# Patient Record
Sex: MALE | Race: WHITE | NOT HISPANIC OR LATINO | URBAN - METROPOLITAN AREA
[De-identification: names, ages, dates, MRNs, and addresses within clinical notes are randomized per-mention and may not be internally consistent; named-entity substitution may affect disease eponyms.]

---

## 2021-01-07 ENCOUNTER — INPATIENT (INPATIENT)
Facility: HOSPITAL | Age: 86
LOS: 12 days | Discharge: NURSING, REHAB OR LONG-TERM CARE | DRG: 177 | End: 2021-01-20
Attending: INTERNAL MEDICINE | Admitting: INTERNAL MEDICINE
Payer: MEDICARE

## 2021-01-07 VITALS
HEART RATE: 95 BPM | SYSTOLIC BLOOD PRESSURE: 108 MMHG | TEMPERATURE: 98 F | DIASTOLIC BLOOD PRESSURE: 68 MMHG | OXYGEN SATURATION: 88 % | RESPIRATION RATE: 17 BRPM

## 2021-01-07 DIAGNOSIS — Z29.9 ENCOUNTER FOR PROPHYLACTIC MEASURES, UNSPECIFIED: ICD-10-CM

## 2021-01-07 DIAGNOSIS — U07.1 COVID-19: ICD-10-CM

## 2021-01-07 DIAGNOSIS — R07.9 CHEST PAIN, UNSPECIFIED: ICD-10-CM

## 2021-01-07 DIAGNOSIS — I10 ESSENTIAL (PRIMARY) HYPERTENSION: ICD-10-CM

## 2021-01-07 DIAGNOSIS — Z90.49 ACQUIRED ABSENCE OF OTHER SPECIFIED PARTS OF DIGESTIVE TRACT: Chronic | ICD-10-CM

## 2021-01-07 DIAGNOSIS — E11.9 TYPE 2 DIABETES MELLITUS WITHOUT COMPLICATIONS: ICD-10-CM

## 2021-01-07 DIAGNOSIS — Z71.89 OTHER SPECIFIED COUNSELING: ICD-10-CM

## 2021-01-07 DIAGNOSIS — N17.9 ACUTE KIDNEY FAILURE, UNSPECIFIED: ICD-10-CM

## 2021-01-07 DIAGNOSIS — E78.5 HYPERLIPIDEMIA, UNSPECIFIED: ICD-10-CM

## 2021-01-07 LAB
ALBUMIN SERPL ELPH-MCNC: 2.8 G/DL — LOW (ref 3.5–5)
ALP SERPL-CCNC: 48 U/L — SIGNIFICANT CHANGE UP (ref 40–120)
ALT FLD-CCNC: 33 U/L DA — SIGNIFICANT CHANGE UP (ref 10–60)
ANION GAP SERPL CALC-SCNC: 11 MMOL/L — SIGNIFICANT CHANGE UP (ref 5–17)
ANISOCYTOSIS BLD QL: SLIGHT — SIGNIFICANT CHANGE UP
AST SERPL-CCNC: 103 U/L — HIGH (ref 10–40)
BASE EXCESS BLDV CALC-SCNC: 1.9 MMOL/L — SIGNIFICANT CHANGE UP (ref -2–2)
BASOPHILS # BLD AUTO: 0 K/UL — SIGNIFICANT CHANGE UP (ref 0–0.2)
BASOPHILS NFR BLD AUTO: 0 % — SIGNIFICANT CHANGE UP (ref 0–2)
BILIRUB SERPL-MCNC: 0.8 MG/DL — SIGNIFICANT CHANGE UP (ref 0.2–1.2)
BLOOD GAS COMMENTS, VENOUS: SIGNIFICANT CHANGE UP
BUN SERPL-MCNC: 27 MG/DL — HIGH (ref 7–18)
CALCIUM SERPL-MCNC: 8.8 MG/DL — SIGNIFICANT CHANGE UP (ref 8.4–10.5)
CHLORIDE SERPL-SCNC: 102 MMOL/L — SIGNIFICANT CHANGE UP (ref 96–108)
CHLORIDE UR-SCNC: 56 MMOL/L — SIGNIFICANT CHANGE UP
CHOLEST SERPL-MCNC: 101 MG/DL — SIGNIFICANT CHANGE UP
CK MB BLD-MCNC: <0.1 % — SIGNIFICANT CHANGE UP (ref 0–3.5)
CK MB CFR SERPL CALC: <1 NG/ML — SIGNIFICANT CHANGE UP (ref 0–3.6)
CK SERPL-CCNC: 1162 U/L — HIGH (ref 35–232)
CK SERPL-CCNC: 1350 U/L — HIGH (ref 35–232)
CO2 SERPL-SCNC: 25 MMOL/L — SIGNIFICANT CHANGE UP (ref 22–31)
CREAT ?TM UR-MCNC: 148 MG/DL — SIGNIFICANT CHANGE UP
CREAT SERPL-MCNC: 1.64 MG/DL — HIGH (ref 0.5–1.3)
D DIMER BLD IA.RAPID-MCNC: 606 NG/ML DDU — HIGH
EOSINOPHIL # BLD AUTO: 0 K/UL — SIGNIFICANT CHANGE UP (ref 0–0.5)
EOSINOPHIL NFR BLD AUTO: 0 % — SIGNIFICANT CHANGE UP (ref 0–6)
ERYTHROCYTE [SEDIMENTATION RATE] IN BLOOD: 20 MM/HR — SIGNIFICANT CHANGE UP (ref 0–20)
GLUCOSE BLDC GLUCOMTR-MCNC: 288 MG/DL — HIGH (ref 70–99)
GLUCOSE SERPL-MCNC: 224 MG/DL — HIGH (ref 70–99)
HCO3 BLDV-SCNC: 27 MMOL/L — SIGNIFICANT CHANGE UP (ref 21–29)
HCT VFR BLD CALC: 46.8 % — SIGNIFICANT CHANGE UP (ref 39–50)
HDLC SERPL-MCNC: 74 MG/DL — SIGNIFICANT CHANGE UP
HGB BLD-MCNC: 15.6 G/DL — SIGNIFICANT CHANGE UP (ref 13–17)
HOROWITZ INDEX BLDV+IHG-RTO: 21 — SIGNIFICANT CHANGE UP
INR BLD: 1.12 RATIO — SIGNIFICANT CHANGE UP (ref 0.88–1.16)
LACTATE SERPL-SCNC: 1.9 MMOL/L — SIGNIFICANT CHANGE UP (ref 0.7–2)
LIPID PNL WITH DIRECT LDL SERPL: 10 MG/DL — SIGNIFICANT CHANGE UP
LYMPHOCYTES # BLD AUTO: 0.66 K/UL — LOW (ref 1–3.3)
LYMPHOCYTES # BLD AUTO: 10 % — LOW (ref 13–44)
MANUAL SMEAR VERIFICATION: SIGNIFICANT CHANGE UP
MCHC RBC-ENTMCNC: 31.1 PG — SIGNIFICANT CHANGE UP (ref 27–34)
MCHC RBC-ENTMCNC: 33.3 GM/DL — SIGNIFICANT CHANGE UP (ref 32–36)
MCV RBC AUTO: 93.2 FL — SIGNIFICANT CHANGE UP (ref 80–100)
MICROCYTES BLD QL: SLIGHT — SIGNIFICANT CHANGE UP
MONOCYTES # BLD AUTO: 0.26 K/UL — SIGNIFICANT CHANGE UP (ref 0–0.9)
MONOCYTES NFR BLD AUTO: 4 % — SIGNIFICANT CHANGE UP (ref 2–14)
NEUTROPHILS # BLD AUTO: 5.43 K/UL — SIGNIFICANT CHANGE UP (ref 1.8–7.4)
NEUTROPHILS NFR BLD AUTO: 82 % — HIGH (ref 43–77)
NON HDL CHOLESTEROL: 27 MG/DL — SIGNIFICANT CHANGE UP
NRBC # BLD: 0 /100 — SIGNIFICANT CHANGE UP (ref 0–0)
NT-PROBNP SERPL-SCNC: 231 PG/ML — SIGNIFICANT CHANGE UP (ref 0–450)
OSMOLALITY UR: 816 MOS/KG — SIGNIFICANT CHANGE UP (ref 50–1200)
OVALOCYTES BLD QL SMEAR: SLIGHT — SIGNIFICANT CHANGE UP
PCO2 BLDV: 44 MMHG — SIGNIFICANT CHANGE UP (ref 35–50)
PH BLDV: 7.4 — SIGNIFICANT CHANGE UP (ref 7.35–7.45)
PLAT MORPH BLD: NORMAL — SIGNIFICANT CHANGE UP
PLATELET # BLD AUTO: 135 K/UL — LOW (ref 150–400)
PO2 BLDV: 21 MMHG — LOW (ref 25–45)
POIKILOCYTOSIS BLD QL AUTO: SLIGHT — SIGNIFICANT CHANGE UP
POTASSIUM SERPL-MCNC: 4.4 MMOL/L — SIGNIFICANT CHANGE UP (ref 3.5–5.3)
POTASSIUM SERPL-SCNC: 4.4 MMOL/L — SIGNIFICANT CHANGE UP (ref 3.5–5.3)
PROT SERPL-MCNC: 7 G/DL — SIGNIFICANT CHANGE UP (ref 6–8.3)
PROTHROM AB SERPL-ACNC: 13.2 SEC — SIGNIFICANT CHANGE UP (ref 10.6–13.6)
RAPID RVP RESULT: DETECTED
RBC # BLD: 5.02 M/UL — SIGNIFICANT CHANGE UP (ref 4.2–5.8)
RBC # FLD: 13.8 % — SIGNIFICANT CHANGE UP (ref 10.3–14.5)
RBC BLD AUTO: ABNORMAL
SAO2 % BLDV: 29 % — LOW (ref 67–88)
SARS-COV-2 RNA SPEC QL NAA+PROBE: DETECTED
SODIUM SERPL-SCNC: 138 MMOL/L — SIGNIFICANT CHANGE UP (ref 135–145)
SODIUM UR-SCNC: 56 MMOL/L — SIGNIFICANT CHANGE UP
TRIGL SERPL-MCNC: 83 MG/DL — SIGNIFICANT CHANGE UP
TROPONIN I SERPL-MCNC: 0.03 NG/ML — SIGNIFICANT CHANGE UP (ref 0–0.04)
TROPONIN I SERPL-MCNC: 0.03 NG/ML — SIGNIFICANT CHANGE UP (ref 0–0.04)
TSH SERPL-MCNC: 1.02 UU/ML — SIGNIFICANT CHANGE UP (ref 0.34–4.82)
VARIANT LYMPHS # BLD: 4 % — SIGNIFICANT CHANGE UP (ref 0–6)
WBC # BLD: 6.62 K/UL — SIGNIFICANT CHANGE UP (ref 3.8–10.5)
WBC # FLD AUTO: 6.62 K/UL — SIGNIFICANT CHANGE UP (ref 3.8–10.5)

## 2021-01-07 PROCEDURE — 93010 ELECTROCARDIOGRAM REPORT: CPT

## 2021-01-07 PROCEDURE — 70450 CT HEAD/BRAIN W/O DYE: CPT | Mod: 26

## 2021-01-07 PROCEDURE — 99283 EMERGENCY DEPT VISIT LOW MDM: CPT | Mod: CS

## 2021-01-07 PROCEDURE — 71045 X-RAY EXAM CHEST 1 VIEW: CPT | Mod: 26

## 2021-01-07 RX ORDER — REMDESIVIR 5 MG/ML
200 INJECTION INTRAVENOUS EVERY 24 HOURS
Refills: 0 | Status: COMPLETED | OUTPATIENT
Start: 2021-01-07 | End: 2021-01-07

## 2021-01-07 RX ORDER — ATORVASTATIN CALCIUM 80 MG/1
40 TABLET, FILM COATED ORAL AT BEDTIME
Refills: 0 | Status: DISCONTINUED | OUTPATIENT
Start: 2021-01-07 | End: 2021-01-20

## 2021-01-07 RX ORDER — PANTOPRAZOLE SODIUM 20 MG/1
40 TABLET, DELAYED RELEASE ORAL
Refills: 0 | Status: DISCONTINUED | OUTPATIENT
Start: 2021-01-07 | End: 2021-01-20

## 2021-01-07 RX ORDER — SODIUM CHLORIDE 9 MG/ML
1000 INJECTION INTRAMUSCULAR; INTRAVENOUS; SUBCUTANEOUS ONCE
Refills: 0 | Status: COMPLETED | OUTPATIENT
Start: 2021-01-07 | End: 2021-01-07

## 2021-01-07 RX ORDER — HEPARIN SODIUM 5000 [USP'U]/ML
5000 INJECTION INTRAVENOUS; SUBCUTANEOUS EVERY 8 HOURS
Refills: 0 | Status: DISCONTINUED | OUTPATIENT
Start: 2021-01-07 | End: 2021-01-14

## 2021-01-07 RX ORDER — DEXAMETHASONE 0.5 MG/5ML
10 ELIXIR ORAL ONCE
Refills: 0 | Status: COMPLETED | OUTPATIENT
Start: 2021-01-07 | End: 2021-01-07

## 2021-01-07 RX ORDER — INSULIN LISPRO 100/ML
VIAL (ML) SUBCUTANEOUS
Refills: 0 | Status: DISCONTINUED | OUTPATIENT
Start: 2021-01-07 | End: 2021-01-12

## 2021-01-07 RX ORDER — ASPIRIN/CALCIUM CARB/MAGNESIUM 324 MG
81 TABLET ORAL DAILY
Refills: 0 | Status: DISCONTINUED | OUTPATIENT
Start: 2021-01-07 | End: 2021-01-20

## 2021-01-07 RX ORDER — DEXAMETHASONE 0.5 MG/5ML
6 ELIXIR ORAL DAILY
Refills: 0 | Status: COMPLETED | OUTPATIENT
Start: 2021-01-07 | End: 2021-01-12

## 2021-01-07 RX ADMIN — ATORVASTATIN CALCIUM 40 MILLIGRAM(S): 80 TABLET, FILM COATED ORAL at 21:33

## 2021-01-07 RX ADMIN — Medication 102 MILLIGRAM(S): at 14:56

## 2021-01-07 RX ADMIN — HEPARIN SODIUM 5000 UNIT(S): 5000 INJECTION INTRAVENOUS; SUBCUTANEOUS at 21:34

## 2021-01-07 RX ADMIN — Medication 81 MILLIGRAM(S): at 17:23

## 2021-01-07 RX ADMIN — REMDESIVIR 500 MILLIGRAM(S): 5 INJECTION INTRAVENOUS at 21:34

## 2021-01-07 RX ADMIN — SODIUM CHLORIDE 1000 MILLILITER(S): 9 INJECTION INTRAMUSCULAR; INTRAVENOUS; SUBCUTANEOUS at 13:38

## 2021-01-07 NOTE — ED PROVIDER NOTE - CONSTITUTIONAL, MLM
normal... Elderly gentleman, nontoxic appearing, awake, alert, oriented to person, place, time/situation and in no apparent distress.

## 2021-01-07 NOTE — H&P ADULT - HISTORY OF PRESENT ILLNESS
85 year old male with significant medical history of Dm, HLD, HTn and surgical history of appendectomy presenting to the ED with complaints of abdominal pain and intermittent chest pain for the last 7 days. He had fever 7 days ago, since then he is feeling weak Reports that the chest pain is on and off and that yesterday patient fell down due to weakness. Relates that he has not been able to eat, having loss of appetite, but does feel thirsty. States that he had a fever a couple of days ago. Denies significant weight loss, cough, abdominal pain, nausea, vomiting, headache, visual changes, or any other acute complaints. 85 year old male with significant medical history of Dm, HLD, HTn, BPH and surgical history of appendectomy presenting to the ED with complaints of abdominal pain and intermittent chest pain for the last 7 days. He had fever 7 days ago, since then he is feeling weak, had two falls, one was 3 days ago and the other was yesterday. He denies LOC, syncope or prodromal symptoms. He says he hit his head at back. Reports that the chest pain is on and off, 5/10, non radiating, feels like pressure and tightness, associated with breathing difficulty. Relates that he has not been able to eat, having loss of appetite, but does feel thirsty. Denies significant weight loss, cough, abdominal pain, nausea, vomiting, headache, visual changes, or any other acute complaints.

## 2021-01-07 NOTE — H&P ADULT - NSICDXPASTMEDICALHX_GEN_ALL_CORE_FT
PAST MEDICAL HISTORY:  BPH (benign prostatic hyperplasia)     DM (diabetes mellitus)     HLD (hyperlipidemia)     HTN (hypertension)

## 2021-01-07 NOTE — H&P ADULT - ASSESSMENT
85 year old male with significant medical history of Dm, HLD, HTn and surgical history of appendectomy presenting to the ED with complaints of abdominal pain and intermittent chest pain for the last 7 days.    ED:  COVID positive   CXR b/l infiltrate  Saturating 89%on RA    Patient is being admitted to tele to r/o ACS, NSTEMi     85 year old male with significant medical history of Dm, HLD, HTn and surgical history of appendectomy presenting to the ED with complaints of abdominal pain and intermittent chest pain for the last 7 days.    ED:  COVID positive   CXR b/l infiltrate  Saturating 89%on RA    Patient is being admitted to tele to r/o ACS, NSTEMi    ******Asked patient regarding medications, he did not remember the. reached out to son, he did not know either pharmcy or medications name. He said to call back in few hours, so that he can reach home and find out. I called him back at 6Pm, he gave me pharmacy name, but did not know medications. I tried reaching pharmacy, it is closed. I tried reaching PCP,  answered the call and put me on hold for 10 minutes and replied back saying doctor is busy. Medications from EMS record was entered in OP med rec. Please call pharmacy in AM if possible to get medications****     85 year old male with significant medical history of Dm, HLD, HTn and surgical history of appendectomy presenting to the ED with complaints of abdominal pain and intermittent chest pain for the last 7 days.    ED:  COVID positive   CXR b/l infiltrate  Saturating 89%on RA    Patient is being admitted to tele to r/o ACS, NSTEMi    ******Asked patient regarding medications, he did not remember them. Reached out to son, he did not know either pharmcy or medications name. He said to call back in few hours, so that he can reach home and find out. I called him back at 6Pm, he gave me pharmacy name, but did not know medications. I tried reaching pharmacy, it is closed. I tried reaching PCP,  answered the call and put me on hold for 10 minutes and replied back saying doctor is busy. I entered medications from EMS record in OP med rec. Please call pharmacy in AM if possible to get medications****

## 2021-01-07 NOTE — H&P ADULT - PROBLEM SELECTOR PLAN 5
Has h/o DM  Started on HSS  Monitor ACHS   f/u HBA1c Has h/o HTn  c/w home meds with parameters  Restart once medications are reconciled from pharmacy  monitor BP

## 2021-01-07 NOTE — H&P ADULT - NSHPPHYSICALEXAM_GEN_ALL_CORE
Vital Signs (24 Hrs):  T(C): 36.9 (01-07-21 @ 10:19), Max: 36.9 (01-07-21 @ 10:19)  HR: 95 (01-07-21 @ 10:19) (95 - 95)  BP: 108/68 (01-07-21 @ 10:19) (108/68 - 108/68)  RR: 18 (01-07-21 @ 14:38) (17 - 18)  SpO2: 96% (01-07-21 @ 14:38) (88% - 96%)

## 2021-01-07 NOTE — H&P ADULT - PROBLEM SELECTOR PLAN 4
Has h/o HTn  c/w home meds with parameters  monitor BP Has h/o HTn  c/w home meds with parameters  Restart once medications are reconciled from pharmacy  monitor BP presented with elevated cr  Likely due to dehydration, holding IV fluids in view of covid  f/u urine lytes  Monitor BMP  if cr clearance decreases please hold remdesivir  avoid nephrotoxins

## 2021-01-07 NOTE — H&P ADULT - PROBLEM SELECTOR PLAN 2
Presented with respiratory status and chest pain  Saturating 89% on Ra  Saturation improved to 94% on 2L NC  COVID positive  f/u inflammatory markers  Will start on decadron and remdesivir  Monitor respiratory status  O2 supplement as needed Presented with respiratory status and chest pain  Saturating 89% on Ra  Saturation improved to 94% on 2L NC  COVID positive  f/u inflammatory markers  Will start on decadron   Will give one dose of remdesivir, check cr cl tomorrow and resume other doses as cr cl is borderline today  Monitor respiratory status  O2 supplement as needed

## 2021-01-07 NOTE — ED ADULT NURSE NOTE - OBJECTIVE STATEMENT
Russian  used Pina #[id number]. Pt c/o of fever that occurred 7 days ago and from that time he has been feeling weakness in his legs and arms. Pt states he fell yesterday. Pt is A&OX3, no acute distress noted, denies chest pain, no shortness of breath indicated.

## 2021-01-07 NOTE — ED PROVIDER NOTE - CLINICAL SUMMARY MEDICAL DECISION MAKING FREE TEXT BOX
85 year old male presenting with weakness and fatigue. Could be secondary to underlying malignancy vs ACS vs dehydration vs infectious. Will order labs, EKG, head CT, and likely admit.

## 2021-01-07 NOTE — ED PROVIDER NOTE - OBJECTIVE STATEMENT
85 year old male with PSHx of appendectomy presenting to the ED with complaints of generalized weakness and intermittent chest pain for the last 7 days. Reports that the chest pain is on and off and that yesterday patient fell down due to weakness. Relates that he has not been able to eat, having loss of appetite, but does feel thirsty. States that he had a fever a couple of days ago. Denies significant weight loss, cough, abdominal pain, nausea, vomiting, headache, visual changes, or any other acute complaints.

## 2021-01-07 NOTE — ED PROVIDER NOTE - CARE PLAN
Principal Discharge DX:	COVID-19   Principal Discharge DX:	COVID-19  Secondary Diagnosis:	Rhabdomyolysis  Secondary Diagnosis:	Hypoxia  Secondary Diagnosis:	Weakness

## 2021-01-07 NOTE — ED PROVIDER NOTE - PROGRESS NOTE DETAILS
ricardo: pt cxr shows covid, covid swab +. pt with mild rhabdo. ct head no ich or acute path on my read. pt require 4 L NC to keep o2 above 92%, room air 88%.  admit to med for covid 19

## 2021-01-07 NOTE — H&P ADULT - PROBLEM SELECTOR PLAN 1
Presented with chest pain, atypical  Examination findings significant for ronchi in b/l lung fields  COVID positive  CXr shows b/l infiltrates  ED admitted to tele for chest pain  Likely pain is due to covid infection and respiratory distress  Troponin 1 negative  observe on tele for 24 hrs and trend trop  Cardio- Dr Beard

## 2021-01-08 ENCOUNTER — TRANSCRIPTION ENCOUNTER (OUTPATIENT)
Age: 86
End: 2021-01-08

## 2021-01-08 LAB
A1C WITH ESTIMATED AVERAGE GLUCOSE RESULT: 7.7 % — HIGH (ref 4–5.6)
ALBUMIN SERPL ELPH-MCNC: 2.5 G/DL — LOW (ref 3.5–5)
ALP SERPL-CCNC: 47 U/L — SIGNIFICANT CHANGE UP (ref 40–120)
ALT FLD-CCNC: 35 U/L DA — SIGNIFICANT CHANGE UP (ref 10–60)
ANION GAP SERPL CALC-SCNC: 14 MMOL/L — SIGNIFICANT CHANGE UP (ref 5–17)
APPEARANCE UR: CLEAR — SIGNIFICANT CHANGE UP
APPEARANCE UR: CLEAR — SIGNIFICANT CHANGE UP
AST SERPL-CCNC: 90 U/L — HIGH (ref 10–40)
BACTERIA # UR AUTO: ABNORMAL /HPF
BACTERIA # UR AUTO: ABNORMAL /HPF
BASOPHILS # BLD AUTO: 0.04 K/UL — SIGNIFICANT CHANGE UP (ref 0–0.2)
BASOPHILS NFR BLD AUTO: 0.6 % — SIGNIFICANT CHANGE UP (ref 0–2)
BILIRUB SERPL-MCNC: 0.8 MG/DL — SIGNIFICANT CHANGE UP (ref 0.2–1.2)
BILIRUB UR-MCNC: NEGATIVE — SIGNIFICANT CHANGE UP
BILIRUB UR-MCNC: NEGATIVE — SIGNIFICANT CHANGE UP
BUN SERPL-MCNC: 29 MG/DL — HIGH (ref 7–18)
CALCIUM SERPL-MCNC: 8.7 MG/DL — SIGNIFICANT CHANGE UP (ref 8.4–10.5)
CHLORIDE SERPL-SCNC: 108 MMOL/L — SIGNIFICANT CHANGE UP (ref 96–108)
CO2 SERPL-SCNC: 21 MMOL/L — LOW (ref 22–31)
COLOR SPEC: YELLOW — SIGNIFICANT CHANGE UP
COLOR SPEC: YELLOW — SIGNIFICANT CHANGE UP
CREAT SERPL-MCNC: 1.52 MG/DL — HIGH (ref 0.5–1.3)
CRP SERPL-MCNC: 8.05 MG/DL — HIGH (ref 0–0.4)
CRP SERPL-MCNC: 8.83 MG/DL — HIGH (ref 0–0.4)
DIFF PNL FLD: ABNORMAL
DIFF PNL FLD: ABNORMAL
EOSINOPHIL # BLD AUTO: 0 K/UL — SIGNIFICANT CHANGE UP (ref 0–0.5)
EOSINOPHIL NFR BLD AUTO: 0 % — SIGNIFICANT CHANGE UP (ref 0–6)
EPI CELLS # UR: SIGNIFICANT CHANGE UP /HPF
EPI CELLS # UR: SIGNIFICANT CHANGE UP /HPF
ESTIMATED AVERAGE GLUCOSE: 174 MG/DL — HIGH (ref 68–114)
FOLATE SERPL-MCNC: 11.5 NG/ML — SIGNIFICANT CHANGE UP
GLUCOSE BLDC GLUCOMTR-MCNC: 272 MG/DL — HIGH (ref 70–99)
GLUCOSE BLDC GLUCOMTR-MCNC: 288 MG/DL — HIGH (ref 70–99)
GLUCOSE BLDC GLUCOMTR-MCNC: 296 MG/DL — HIGH (ref 70–99)
GLUCOSE BLDC GLUCOMTR-MCNC: 303 MG/DL — HIGH (ref 70–99)
GLUCOSE SERPL-MCNC: 327 MG/DL — HIGH (ref 70–99)
GLUCOSE UR QL: 1000 MG/DL
GLUCOSE UR QL: 1000 MG/DL
HCT VFR BLD CALC: 47.2 % — SIGNIFICANT CHANGE UP (ref 39–50)
HGB BLD-MCNC: 15.6 G/DL — SIGNIFICANT CHANGE UP (ref 13–17)
IMM GRANULOCYTES NFR BLD AUTO: 5.2 % — HIGH (ref 0–1.5)
KETONES UR-MCNC: ABNORMAL
KETONES UR-MCNC: ABNORMAL
LEUKOCYTE ESTERASE UR-ACNC: NEGATIVE — SIGNIFICANT CHANGE UP
LEUKOCYTE ESTERASE UR-ACNC: NEGATIVE — SIGNIFICANT CHANGE UP
LYMPHOCYTES # BLD AUTO: 0.74 K/UL — LOW (ref 1–3.3)
LYMPHOCYTES # BLD AUTO: 11.3 % — LOW (ref 13–44)
MAGNESIUM SERPL-MCNC: 2.4 MG/DL — SIGNIFICANT CHANGE UP (ref 1.6–2.6)
MCHC RBC-ENTMCNC: 30.8 PG — SIGNIFICANT CHANGE UP (ref 27–34)
MCHC RBC-ENTMCNC: 33.1 GM/DL — SIGNIFICANT CHANGE UP (ref 32–36)
MCV RBC AUTO: 93.3 FL — SIGNIFICANT CHANGE UP (ref 80–100)
MONOCYTES # BLD AUTO: 0.28 K/UL — SIGNIFICANT CHANGE UP (ref 0–0.9)
MONOCYTES NFR BLD AUTO: 4.3 % — SIGNIFICANT CHANGE UP (ref 2–14)
NEUTROPHILS # BLD AUTO: 5.12 K/UL — SIGNIFICANT CHANGE UP (ref 1.8–7.4)
NEUTROPHILS NFR BLD AUTO: 78.6 % — HIGH (ref 43–77)
NITRITE UR-MCNC: NEGATIVE — SIGNIFICANT CHANGE UP
NITRITE UR-MCNC: NEGATIVE — SIGNIFICANT CHANGE UP
NRBC # BLD: 0 /100 WBCS — SIGNIFICANT CHANGE UP (ref 0–0)
PH UR: 5 — SIGNIFICANT CHANGE UP (ref 5–8)
PH UR: 5 — SIGNIFICANT CHANGE UP (ref 5–8)
PHOSPHATE SERPL-MCNC: 3.7 MG/DL — SIGNIFICANT CHANGE UP (ref 2.5–4.5)
PLATELET # BLD AUTO: 169 K/UL — SIGNIFICANT CHANGE UP (ref 150–400)
POTASSIUM SERPL-MCNC: 4.4 MMOL/L — SIGNIFICANT CHANGE UP (ref 3.5–5.3)
POTASSIUM SERPL-SCNC: 4.4 MMOL/L — SIGNIFICANT CHANGE UP (ref 3.5–5.3)
PROCALCITONIN SERPL-MCNC: 0.19 NG/ML — HIGH (ref 0.02–0.1)
PROT SERPL-MCNC: 6.5 G/DL — SIGNIFICANT CHANGE UP (ref 6–8.3)
PROT UR-MCNC: 100
PROT UR-MCNC: 100
RBC # BLD: 5.06 M/UL — SIGNIFICANT CHANGE UP (ref 4.2–5.8)
RBC # FLD: 13.8 % — SIGNIFICANT CHANGE UP (ref 10.3–14.5)
RBC CASTS # UR COMP ASSIST: ABNORMAL /HPF (ref 0–2)
RBC CASTS # UR COMP ASSIST: ABNORMAL /HPF (ref 0–2)
SODIUM SERPL-SCNC: 143 MMOL/L — SIGNIFICANT CHANGE UP (ref 135–145)
SP GR SPEC: 1.02 — SIGNIFICANT CHANGE UP (ref 1.01–1.02)
SP GR SPEC: 1.02 — SIGNIFICANT CHANGE UP (ref 1.01–1.02)
UROBILINOGEN FLD QL: NEGATIVE — SIGNIFICANT CHANGE UP
UROBILINOGEN FLD QL: NEGATIVE — SIGNIFICANT CHANGE UP
VIT B12 SERPL-MCNC: 1619 PG/ML — HIGH (ref 232–1245)
WBC # BLD: 6.52 K/UL — SIGNIFICANT CHANGE UP (ref 3.8–10.5)
WBC # FLD AUTO: 6.52 K/UL — SIGNIFICANT CHANGE UP (ref 3.8–10.5)
WBC UR QL: SIGNIFICANT CHANGE UP /HPF (ref 0–5)
WBC UR QL: SIGNIFICANT CHANGE UP /HPF (ref 0–5)

## 2021-01-08 RX ORDER — ASPIRIN/CALCIUM CARB/MAGNESIUM 324 MG
1 TABLET ORAL
Qty: 0 | Refills: 0 | DISCHARGE

## 2021-01-08 RX ORDER — TAMSULOSIN HYDROCHLORIDE 0.4 MG/1
1 CAPSULE ORAL
Qty: 0 | Refills: 0 | DISCHARGE

## 2021-01-08 RX ORDER — SODIUM CHLORIDE 9 MG/ML
1000 INJECTION, SOLUTION INTRAVENOUS
Refills: 0 | Status: DISCONTINUED | OUTPATIENT
Start: 2021-01-08 | End: 2021-01-09

## 2021-01-08 RX ORDER — SEMAGLUTIDE 0.68 MG/ML
1 INJECTION, SOLUTION SUBCUTANEOUS
Qty: 0 | Refills: 0 | DISCHARGE

## 2021-01-08 RX ORDER — LOSARTAN POTASSIUM 100 MG/1
25 TABLET, FILM COATED ORAL DAILY
Refills: 0 | Status: DISCONTINUED | OUTPATIENT
Start: 2021-01-08 | End: 2021-01-20

## 2021-01-08 RX ORDER — BROMOCRIPTINE MESYLATE 5 MG/1
2 CAPSULE ORAL
Qty: 0 | Refills: 0 | DISCHARGE

## 2021-01-08 RX ORDER — TAMSULOSIN HYDROCHLORIDE 0.4 MG/1
0.4 CAPSULE ORAL AT BEDTIME
Refills: 0 | Status: DISCONTINUED | OUTPATIENT
Start: 2021-01-08 | End: 2021-01-20

## 2021-01-08 RX ORDER — REMDESIVIR 5 MG/ML
100 INJECTION INTRAVENOUS EVERY 24 HOURS
Refills: 0 | Status: COMPLETED | OUTPATIENT
Start: 2021-01-08 | End: 2021-01-11

## 2021-01-08 RX ORDER — AZILSARTAN KAMEDOXOMIL 40 MG/1
1 TABLET ORAL
Qty: 0 | Refills: 0 | DISCHARGE

## 2021-01-08 RX ORDER — INSULIN GLARGINE 100 [IU]/ML
10 INJECTION, SOLUTION SUBCUTANEOUS AT BEDTIME
Refills: 0 | Status: DISCONTINUED | OUTPATIENT
Start: 2021-01-08 | End: 2021-01-11

## 2021-01-08 RX ORDER — REMDESIVIR 5 MG/ML
INJECTION INTRAVENOUS
Refills: 0 | Status: DISCONTINUED | OUTPATIENT
Start: 2021-01-08 | End: 2021-01-08

## 2021-01-08 RX ADMIN — HEPARIN SODIUM 5000 UNIT(S): 5000 INJECTION INTRAVENOUS; SUBCUTANEOUS at 21:38

## 2021-01-08 RX ADMIN — Medication 81 MILLIGRAM(S): at 12:17

## 2021-01-08 RX ADMIN — HEPARIN SODIUM 5000 UNIT(S): 5000 INJECTION INTRAVENOUS; SUBCUTANEOUS at 06:24

## 2021-01-08 RX ADMIN — Medication 3: at 16:59

## 2021-01-08 RX ADMIN — INSULIN GLARGINE 10 UNIT(S): 100 INJECTION, SOLUTION SUBCUTANEOUS at 21:38

## 2021-01-08 RX ADMIN — ATORVASTATIN CALCIUM 40 MILLIGRAM(S): 80 TABLET, FILM COATED ORAL at 21:38

## 2021-01-08 RX ADMIN — Medication 4: at 07:57

## 2021-01-08 RX ADMIN — HEPARIN SODIUM 5000 UNIT(S): 5000 INJECTION INTRAVENOUS; SUBCUTANEOUS at 14:07

## 2021-01-08 RX ADMIN — SODIUM CHLORIDE 70 MILLILITER(S): 9 INJECTION, SOLUTION INTRAVENOUS at 14:42

## 2021-01-08 RX ADMIN — TAMSULOSIN HYDROCHLORIDE 0.4 MILLIGRAM(S): 0.4 CAPSULE ORAL at 21:38

## 2021-01-08 RX ADMIN — PANTOPRAZOLE SODIUM 40 MILLIGRAM(S): 20 TABLET, DELAYED RELEASE ORAL at 06:24

## 2021-01-08 RX ADMIN — Medication 6 MILLIGRAM(S): at 06:24

## 2021-01-08 RX ADMIN — REMDESIVIR 500 MILLIGRAM(S): 5 INJECTION INTRAVENOUS at 21:38

## 2021-01-08 RX ADMIN — Medication 3: at 12:17

## 2021-01-08 NOTE — PROGRESS NOTE ADULT - PROBLEM SELECTOR PLAN 1
Presented with chest pain, atypical  Examination findings significant for ronchi in b/l lung fields  COVID positive  CXr shows b/l infiltrates  ED admitted to tele for chest pain  Likely pain is due to covid infection and respiratory distress  Troponin 1 negative  observe on tele for 24 hrs and trend trop  Cardio- Dr Beard Presented with chest pain, atypical  COVID positive  CXr shows b/l infiltrates  ED admitted to tele for chest pain  Likely pain is due to covid infection and respiratory distress  Troponin  negative x2  observe on tele   Cardio- Dr Beard

## 2021-01-08 NOTE — PROGRESS NOTE ADULT - PROBLEM SELECTOR PLAN 5
Has h/o HTn  c/w home meds with parameters  Restart once medications are reconciled from pharmacy  monitor BP Has h/o HTn  c/w home meds with parameters  monitor BP

## 2021-01-08 NOTE — PROGRESS NOTE ADULT - SUBJECTIVE AND OBJECTIVE BOX
PGY-1 Progress Note discussed with attending    PAGER #: [[phone]] TILL 5:00 PM  PLEASE CONTACT ON CALL TEAM:  - On Call Team (Please refer to Jag) FROM 5:00 PM - 8:30PM  - Nightfloat Team FROM 8:30 -7:30 AM    CHIEF COMPLAINT & BRIEF HOSPITAL COURSE:    INTERVAL HPI/OVERNIGHT EVENTS:       REVIEW OF SYSTEMS:  CONSTITUTIONAL: No fever, weight loss, or fatigue  RESPIRATORY: No cough, wheezing, chills or hemoptysis; No shortness of breath  CARDIOVASCULAR: No chest pain, palpitations, dizziness, or leg swelling  GASTROINTESTINAL: No abdominal pain. No nausea, vomiting, or hematemesis; No diarrhea or constipation. No melena or hematochezia.  GENITOURINARY: No dysuria or hematuria, urinary frequency  NEUROLOGICAL: No headaches, memory loss, loss of strength, numbness, or tremors  SKIN: No itching, burning, rashes, or lesions     MEDICATIONS  (STANDING):  aspirin enteric coated 81 milliGRAM(s) Oral daily  atorvastatin 40 milliGRAM(s) Oral at bedtime  dexAMETHasone  Injectable 6 milliGRAM(s) IV Push daily  heparin   Injectable 5000 Unit(s) SubCutaneous every 8 hours  insulin lispro (ADMELOG) corrective regimen sliding scale   SubCutaneous three times a day before meals  pantoprazole    Tablet 40 milliGRAM(s) Oral before breakfast    MEDICATIONS  (PRN):      Vital Signs Last 24 Hrs  T(C): 36.6 (08 Jan 2021 08:12), Max: 36.9 (07 Jan 2021 10:19)  T(F): 97.8 (08 Jan 2021 08:12), Max: 98.5 (07 Jan 2021 10:19)  HR: 80 (08 Jan 2021 08:12) (72 - 95)  BP: 121/69 (08 Jan 2021 08:12) (108/68 - 141/72)  BP(mean): --  RR: 20 (08 Jan 2021 08:12) (17 - 20)  SpO2: 91% (08 Jan 2021 08:12) (88% - 96%)    PHYSICAL EXAMINATION:  GENERAL: NAD, well built  HEAD:  Atraumatic, Normocephalic  EYES:  conjunctiva and sclera clear  NECK: Supple, No JVD, Normal thyroid  CHEST/LUNG: Clear to auscultation. Clear to percussion bilaterally; No rales, rhonchi, wheezing, or rubs  HEART: Regular rate and rhythm; No murmurs, rubs, or gallops  ABDOMEN: Soft, Nontender, Nondistended; Bowel sounds present  NERVOUS SYSTEM:  Alert & Oriented X3,    EXTREMITIES:  2+ Peripheral Pulses, No clubbing, cyanosis, or edema  SKIN: warm dry                          15.6   6.52  )-----------( 169      ( 08 Jan 2021 06:47 )             47.2     01-08    143  |  108  |  29<H>  ----------------------------<  327<H>  4.4   |  21<L>  |  1.52<H>    Ca    8.7      08 Jan 2021 06:47  Phos  3.7     01-08  Mg     2.4     01-08    TPro  6.5  /  Alb  2.5<L>  /  TBili  0.8  /  DBili  x   /  AST  90<H>  /  ALT  35  /  AlkPhos  47  01-08    LIVER FUNCTIONS - ( 08 Jan 2021 06:47 )  Alb: 2.5 g/dL / Pro: 6.5 g/dL / ALK PHOS: 47 U/L / ALT: 35 U/L DA / AST: 90 U/L / GGT: x           CARDIAC MARKERS ( 07 Jan 2021 19:11 )  0.028 ng/mL / x     / 1162 U/L / x     / <1.0 ng/mL  CARDIAC MARKERS ( 07 Jan 2021 11:34 )  0.026 ng/mL / x     / 1350 U/L / x     / x          PT/INR - ( 07 Jan 2021 19:11 )   PT: 13.2 sec;   INR: 1.12 ratio                 CAPILLARY BLOOD GLUCOSE  CAPILLARY BLOOD GLUCOSE      POCT Blood Glucose.: 303 mg/dL (08 Jan 2021 07:53)    CAPILLARY BLOOD GLUCOSE      POCT Blood Glucose.: 303 mg/dL (08 Jan 2021 07:53)  POCT Blood Glucose.: 288 mg/dL (07 Jan 2021 21:41)      RADIOLOGY & ADDITIONAL TESTS:                   PGY-1 Progress Note discussed with attending    PAGER #: [[phone]] TILL 5:00 PM  PLEASE CONTACT ON CALL TEAM:  - On Call Team (Please refer to Jag) FROM 5:00 PM - 8:30PM  - Nightfloat Team FROM 8:30 -7:30 AM    CHIEF COMPLAINT & BRIEF HOSPITAL COURSE:85 year old male with significant medical history of Dm, HLD, HTn, BPH and surgical history of appendectomy presenting to the ED with complaints of abdominal pain and intermittent chest pain for the last 7 days. He had fever 7 days ago, since then he is feeling weak, had two falls, one was 3 days ago and the other was yesterday. He denies LOC, syncope or prodromal symptoms. He says he hit his head at back. Reports that the chest pain is on and off, 5/10, non radiating, feels like pressure and tightness, associated with breathing difficulty. Relates that he has not been able to eat, having loss of appetite, but does feel thirsty. Denies significant weight loss, cough, abdominal pain, nausea, vomiting, headache, visual changes, or any other acute complaints.    INTERVAL HPI/OVERNIGHT EVENTS:     patient examined bed side. he is saturating well on 4 L NC . no more chest pain   REVIEW OF SYSTEMS:  CONSTITUTIONAL: No fever, weight loss, or fatigue  RESPIRATORY: No cough, wheezing, chills or hemoptysis; No shortness of breath  CARDIOVASCULAR: No chest pain, palpitations, dizziness, or leg swelling  GASTROINTESTINAL: No abdominal pain. No nausea, vomiting, or hematemesis; No diarrhea or constipation. No melena or hematochezia.  GENITOURINARY: No dysuria or hematuria, urinary frequency  NEUROLOGICAL: No headaches, memory loss, loss of strength, numbness, or tremors  SKIN: No itching, burning, rashes, or lesions     MEDICATIONS  (STANDING):  aspirin enteric coated 81 milliGRAM(s) Oral daily  atorvastatin 40 milliGRAM(s) Oral at bedtime  dexAMETHasone  Injectable 6 milliGRAM(s) IV Push daily  heparin   Injectable 5000 Unit(s) SubCutaneous every 8 hours  insulin lispro (ADMELOG) corrective regimen sliding scale   SubCutaneous three times a day before meals  pantoprazole    Tablet 40 milliGRAM(s) Oral before breakfast    MEDICATIONS  (PRN):      Vital Signs Last 24 Hrs  T(C): 36.6 (08 Jan 2021 08:12), Max: 36.9 (07 Jan 2021 10:19)  T(F): 97.8 (08 Jan 2021 08:12), Max: 98.5 (07 Jan 2021 10:19)  HR: 80 (08 Jan 2021 08:12) (72 - 95)  BP: 121/69 (08 Jan 2021 08:12) (108/68 - 141/72)  BP(mean): --  RR: 20 (08 Jan 2021 08:12) (17 - 20)  SpO2: 91% (08 Jan 2021 08:12) (88% - 96%)    PHYSICAL EXAMINATION:  GENERAL: NAD, well built  HEAD:  Atraumatic, Normocephalic  EYES:  conjunctiva and sclera clear  NECK: Supple, No JVD, Normal thyroid  CHEST/LUNG: Clear to auscultation. Clear to percussion bilaterally; No rales, rhonchi, wheezing, or rubs  HEART: Regular rate and rhythm; No murmurs, rubs, or gallops  ABDOMEN: Soft, Nontender, Nondistended; Bowel sounds present  NERVOUS SYSTEM:  Alert & Oriented X3,    EXTREMITIES:  2+ Peripheral Pulses, No clubbing, cyanosis, or edema  SKIN: warm dry                          15.6   6.52  )-----------( 169      ( 08 Jan 2021 06:47 )             47.2     01-08    143  |  108  |  29<H>  ----------------------------<  327<H>  4.4   |  21<L>  |  1.52<H>    Ca    8.7      08 Jan 2021 06:47  Phos  3.7     01-08  Mg     2.4     01-08    TPro  6.5  /  Alb  2.5<L>  /  TBili  0.8  /  DBili  x   /  AST  90<H>  /  ALT  35  /  AlkPhos  47  01-08    LIVER FUNCTIONS - ( 08 Jan 2021 06:47 )  Alb: 2.5 g/dL / Pro: 6.5 g/dL / ALK PHOS: 47 U/L / ALT: 35 U/L DA / AST: 90 U/L / GGT: x           CARDIAC MARKERS ( 07 Jan 2021 19:11 )  0.028 ng/mL / x     / 1162 U/L / x     / <1.0 ng/mL  CARDIAC MARKERS ( 07 Jan 2021 11:34 )  0.026 ng/mL / x     / 1350 U/L / x     / x          PT/INR - ( 07 Jan 2021 19:11 )   PT: 13.2 sec;   INR: 1.12 ratio                 CAPILLARY BLOOD GLUCOSE  CAPILLARY BLOOD GLUCOSE      POCT Blood Glucose.: 303 mg/dL (08 Jan 2021 07:53)    CAPILLARY BLOOD GLUCOSE      POCT Blood Glucose.: 303 mg/dL (08 Jan 2021 07:53)  POCT Blood Glucose.: 288 mg/dL (07 Jan 2021 21:41)      RADIOLOGY & ADDITIONAL TESTS:                   PGY-1 Progress Note discussed with attending    PAGER #: [[phone]] TILL 5:00 PM  PLEASE CONTACT ON CALL TEAM:  - On Call Team (Please refer to Jag) FROM 5:00 PM - 8:30PM  - Nightfloat Team FROM 8:30 -7:30 AM    CHIEF COMPLAINT & BRIEF HOSPITAL COURSE:85 year old male with significant medical history of Dm, HLD, HTn, BPH and surgical history of appendectomy presenting to the ED with complaints of abdominal pain and intermittent chest pain for the last 7 days. He had fever 7 days ago, since then he is feeling weak, had two falls, one was 3 days ago and the other was yesterday. He denies LOC, syncope or prodromal symptoms. He says he hit his head at back. Reports that the chest pain is on and off, 5/10, non radiating, feels like pressure and tightness, associated with breathing difficulty. Relates that he has not been able to eat, having loss of appetite, but does feel thirsty. Denies significant weight loss, cough, abdominal pain, nausea, vomiting, headache, visual changes, or any other acute complaints.    INTERVAL HPI/OVERNIGHT EVENTS:     patient examined bed side. he is saturating 90 on 4 L NC . no more chest pain or abdominal pain .    REVIEW OF SYSTEMS:  CONSTITUTIONAL: No fever, weight loss, or fatigue  RESPIRATORY: SOB  CARDIOVASCULAR: No chest pain, palpitations, dizziness, or leg swelling  GASTROINTESTINAL: No abdominal pain. No nausea, vomiting, or hematemesis; No diarrhea or constipation. No melena or hematochezia.  GENITOURINARY: No dysuria or hematuria, urinary frequency  NEUROLOGICAL: No headaches, memory loss, loss of strength, numbness, or tremors  SKIN: No itching, burning, rashes, or lesions     MEDICATIONS  (STANDING):  aspirin enteric coated 81 milliGRAM(s) Oral daily  atorvastatin 40 milliGRAM(s) Oral at bedtime  dexAMETHasone  Injectable 6 milliGRAM(s) IV Push daily  heparin   Injectable 5000 Unit(s) SubCutaneous every 8 hours  insulin lispro (ADMELOG) corrective regimen sliding scale   SubCutaneous three times a day before meals  pantoprazole    Tablet 40 milliGRAM(s) Oral before breakfast    MEDICATIONS  (PRN):      Vital Signs Last 24 Hrs  T(C): 36.6 (08 Jan 2021 08:12), Max: 36.9 (07 Jan 2021 10:19)  T(F): 97.8 (08 Jan 2021 08:12), Max: 98.5 (07 Jan 2021 10:19)  HR: 80 (08 Jan 2021 08:12) (72 - 95)  BP: 121/69 (08 Jan 2021 08:12) (108/68 - 141/72)  BP(mean): --  RR: 20 (08 Jan 2021 08:12) (17 - 20)  SpO2: 91% (08 Jan 2021 08:12) (88% - 96%)    PHYSICAL EXAMINATION:  GENERAL: NAD, on NC  HEAD:  Atraumatic, Normocephalic  EYES:  conjunctiva and sclera clear  NECK: Supple, No JVD, Normal thyroid  CHEST/LUNG: Clear to auscultation. Clear to percussion bilaterally; No rales, rhonchi, wheezing, or rubs  HEART: Regular rate and rhythm; No murmurs, rubs, or gallops  ABDOMEN: Soft, Nontender, Nondistended; Bowel sounds present  NERVOUS SYSTEM:  Alert & Oriented X3,    EXTREMITIES:  2+ Peripheral Pulses, No clubbing, cyanosis, or edema  SKIN: warm dry                          15.6   6.52  )-----------( 169      ( 08 Jan 2021 06:47 )             47.2     01-08    143  |  108  |  29<H>  ----------------------------<  327<H>  4.4   |  21<L>  |  1.52<H>    Ca    8.7      08 Jan 2021 06:47  Phos  3.7     01-08  Mg     2.4     01-08    TPro  6.5  /  Alb  2.5<L>  /  TBili  0.8  /  DBili  x   /  AST  90<H>  /  ALT  35  /  AlkPhos  47  01-08    LIVER FUNCTIONS - ( 08 Jan 2021 06:47 )  Alb: 2.5 g/dL / Pro: 6.5 g/dL / ALK PHOS: 47 U/L / ALT: 35 U/L DA / AST: 90 U/L / GGT: x           CARDIAC MARKERS ( 07 Jan 2021 19:11 )  0.028 ng/mL / x     / 1162 U/L / x     / <1.0 ng/mL  CARDIAC MARKERS ( 07 Jan 2021 11:34 )  0.026 ng/mL / x     / 1350 U/L / x     / x          PT/INR - ( 07 Jan 2021 19:11 )   PT: 13.2 sec;   INR: 1.12 ratio                 CAPILLARY BLOOD GLUCOSE  CAPILLARY BLOOD GLUCOSE      POCT Blood Glucose.: 303 mg/dL (08 Jan 2021 07:53)    CAPILLARY BLOOD GLUCOSE      POCT Blood Glucose.: 303 mg/dL (08 Jan 2021 07:53)  POCT Blood Glucose.: 288 mg/dL (07 Jan 2021 21:41)      RADIOLOGY & ADDITIONAL TESTS:

## 2021-01-08 NOTE — DISCHARGE NOTE PROVIDER - EXTENDED VTE OTHER REASON FREE TEXT
Aspirin 81mg daily already in place  On aspirin 81mg daily for one month Aspirin 81mg given daily x 1 month

## 2021-01-08 NOTE — CONSULT NOTE ADULT - ATTENDING COMMENTS
[facility] NEPHROLOGY  Junaid Brody M.D.  Michael Bolton D.O.  Nancy Mederos M.D.  Charisma Smart, MSN, ANP-C  [phone]    [address] [place] [address]

## 2021-01-08 NOTE — CONSULT NOTE ADULT - ASSESSMENT
Patient is a 86yo Senegalese speaking Male with DM, HTN, BPH, HLD p/w abd pain and intermittent chest pain x 7 days. Found to be +COVID-19. Nephrology consulted for Elevated serum creatinine.    1. CHINYERE- mild; likely pre-renal due to decreased PO intake. UA with small ketones, 100 protein, large blood RBC 10-25. CK 1350 which should not cause CHINYERE. FeNa 0.45%. Recc 1/2 NS @ 70cc/hr x 24 hrs. Will consider Renal US if renal function does not improve. Ok to c/w ARB for now, would hold if worsening renal function. Strict I/Os. Avoid nephrotoxins/ NSAIDs/ RCA. Monitor BMP.  2. CKD-3a- Spoke to PMD, Dr. Leonie Ch's office; previous SCr 1.3 on 12/24/20. Defer secondary w/u. Avoid nephrotoxins.   3. BPH- c/w flomax  4.  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 Ok to c/w Losartan for now. Monitor BP  5. COVID-19- Pt on Remdesivir/ steroids. plan as per primary team

## 2021-01-08 NOTE — DISCHARGE NOTE PROVIDER - NSDCMRMEDTOKEN_GEN_ALL_CORE_FT
Actos 45 mg oral tablet: 1 tab(s) orally once a day  aspirin 81 mg oral delayed release tablet: 1 tab(s) orally once a day  Cycloset 0.8 mg oral tablet: 2 tab(s) orally 3 times a day (with meals)  Edarbi 40 mg oral tablet: 1 tab(s) orally once a day  Farxiga 10 mg oral tablet: 1 tab(s) orally once a day  glipiZIDE 10 mg oral tablet: 1 tab(s) orally 2 times a day  Repatha Pushtronex 420 mg/3.5 mL subcutaneous solution: 1 milliliter(s) subcutaneous once a month  rosuvastatin 20 mg oral tablet: 1 tab(s) orally once a day  Rybelsus 14 mg oral tablet: 1 tab(s) orally once a day  tamsulosin 0.4 mg oral capsule: 1 cap(s) orally once a day   Actos 45 mg oral tablet: 1 tab(s) orally once a day  aspirin 81 mg oral delayed release tablet: 1 tab(s) orally once a day  Cycloset 0.8 mg oral tablet: 2 tab(s) orally 3 times a day (with meals)  Edarbi 40 mg oral tablet: 1 tab(s) orally once a day  Farxiga 10 mg oral tablet: 1 tab(s) orally once a day  insulin glargine: 16 unit(s) subcutaneous once a day (at bedtime)  Lipitor 40 mg oral tablet: 1 tab(s) orally once a day (at bedtime)  Repatha Pushtronex 420 mg/3.5 mL subcutaneous solution: 1 milliliter(s) subcutaneous once a month  Rybelsus 14 mg oral tablet: 1 tab(s) orally once a day  tamsulosin 0.4 mg oral capsule: 1 cap(s) orally once a day   aspirin 81 mg oral delayed release tablet: 1 tab(s) orally once a day  Cycloset 0.8 mg oral tablet: 2 tab(s) orally 3 times a day (with meals)  Edarbi 40 mg oral tablet: 1 tab(s) orally once a day  insulin glargine: 16 unit(s) subcutaneous once a day (at bedtime)  Lipitor 40 mg oral tablet: 1 tab(s) orally once a day (at bedtime)  Repatha Pushtronex 420 mg/3.5 mL subcutaneous solution: 1 milliliter(s) subcutaneous once a month  Rybelsus 14 mg oral tablet: 1 tab(s) orally once a day  tamsulosin 0.4 mg oral capsule: 1 cap(s) orally once a day

## 2021-01-08 NOTE — DISCHARGE NOTE PROVIDER - NSDCCPCAREPLAN_GEN_ALL_CORE_FT
PRINCIPAL DISCHARGE DIAGNOSIS  Diagnosis: COVID-19  Assessment and Plan of Treatment: CORONAVIRUS INSTRUCTIONS:   Based on your current clinical status and stability, it has been determined that you no longer need hospitalization and can recover while remaining in self-quarantine at home. You should follow the prevention steps below until a healthcare provider or local or state health department says you can return to your normal activities.   1. You should restrict activities outside your home, except for getting medical care.   2. Do not go to work, school, or public areas.   3. Avoid using public transportation, ride-sharing, or taxis.   4. Separate yourself from other people and animals in your home as much as possible.  When you are around other people (e.g., sharing a room or vehicle) you should wear a facemask.  5. Wash your hands often with soap and water for at least 20 seconds, especially after blowing your nose, coughing, or sneezing; going to the bathroom; and before eating or preparing food.  6. Cover your mouth and nose with a tissue when you cough or sneeze. Throw used tissues in a lined trash can. Immediately wash your hands with soap and water for at least 20 seconds  7. High touch surfaces include counters, tabletops, doorknobs, bathroom fixtures, toilets, phones, keyboards, tablets, and bedside tables.  8. Avoid sharing dishes, drinking glasses, cups, eating utensils, towels, or bedding with other people or pets in your home. After using these items, they should be washed thoroughly with soap and water.  You are strongly advised to seek prompt medical attention if your illness worsens or you develop new symptoms like fever or difficulty breathing.        SECONDARY DISCHARGE DIAGNOSES  Diagnosis: Weakness  Assessment and Plan of Treatment:     Diagnosis: Hypoxia  Assessment and Plan of Treatment:     Diagnosis: Rhabdomyolysis  Assessment and Plan of Treatment:      PRINCIPAL DISCHARGE DIAGNOSIS  Diagnosis: Acute hypoxemic respiratory failure due to COVID-19  Assessment and Plan of Treatment: You came to the hospital with shortness of breath. Your COVID PCR was positive, meaning an active covid infection. You were treated with remdesivir, steroid and oxygen supplementation.   Based on your current clinical status and stability, it has been determined that you no longer need hospitalization and can recover while remaining in self-quarantine at home. You should follow the prevention steps below until a healthcare provider or local or state health department says you can return to your normal activities. Please take aspirin daily to reduce your risk of blood clots.   1. You should restrict activities outside your home, except for getting medical care.   2. Do not go to work, school, or public areas.   3. Avoid using public transportation, ride-sharing, or taxis.   4. Separate yourself from other people and animals in your home as much as possible.  When you are around other people (e.g., sharing a room or vehicle) you should wear a facemask.  5. Wash your hands often with soap and water for at least 20 seconds, especially after blowing your nose, coughing, or sneezing; going to the bathroom; and before eating or preparing food.  6. Cover your mouth and nose with a tissue when you cough or sneeze. Throw used tissues in a lined trash can. Immediately wash your hands with soap and water for at least 20 seconds  7. High touch surfaces include counters, tabletops, doorknobs, bathroom fixtures, toilets, phones, keyboards, tablets, and bedside tables.  8. Avoid sharing dishes, drinking glasses, cups, eating utensils, towels, or bedding with other people or pets in your home. After using these items, they should be washed thoroughly with soap and water. You are strongly advised to seek prompt medical attention if your illness worsens or you develop new symptoms like fever or difficulty breathing. PLEASE CHECK YOUR OXYGEN LEVEL WITH PULSE OXMETRY  3 times a day ot WHEN YOU FEEL SHORT OF BREATH, PLEASE COME TO ED IF IT'S LESS THAN 93%.        SECONDARY DISCHARGE DIAGNOSES  Diagnosis: Chest pain  Assessment and Plan of Treatment: During your admission, you had some chest pain for which you were admitted to Telemetry for cardiac evaluation. Your EKG and cardiac enzymes were within normal limits. You are recommended to continue taking your medications on time, and report to the ER if you have substernal chest pain with or without radiation to the Left shoulder that doesnt improve after you take rest. This may signal a heart attack and you need to be evaluated quickly for the best intervention and outcomes.      Diagnosis: Diabetes  Assessment and Plan of Treatment: You have Type 2 diabetes and were found to have a Hemoglobin A1c of 7.7% which shows a pretty well controlled glucose levels in your body over the past 3 months. However, a normal HbA1c is 5.5%, and you must make some lifestyle choices such as exercise and weight loss in order to make your body less insulin resistant. You are advised to eat foods that are low glycemic index which include beans and complex carbs, and to avoid high GI foods including white rice and potatoes. Uncontrolled sugars can lead to blindness, kidney failure, and contribute to diseases such as heart attacks and strokes. Your were not started on any medications at this hospital visit, and you are advised to keep your Primary Care Physician appointments in order to monitor and change medications as necessary.      Diagnosis: HTN (hypertension)  Assessment and Plan of Treatment: You have high blood pressure, for which you have been started on  LOSARTAN instead of your home medication of AZILSARTAN. It was initially held due to your kidney injury, but then restarted after your renal function improved. It is important to continue to take your medications on time,  monitor your blood pressure at home, keep a log of your home readings and follow up with your Primary Care Physician. As per AHA/ACC guidelines, it is important to adhere to a DASH Diet of fresh fruits, vegetables, lean meats such as poultry and fish, and whole wheat carbs. 30 minutes of aerobic exercise per day 3-4 times a week, reducing salt intake <1.5g/day, and cutting down on highly processed foods are also shown to reduce BP. Uncontrolled BP may result in organ damage to your eyes, brain, heart, and kidneys by causing strokes, heart attacks, kidney failure, and bleeds in your eye.      Diagnosis: CHINYERE (acute kidney injury)  Assessment and Plan of Treatment: You were found to have a worsening of your kidney function from your baseline of 1.2, which we diagnosed by looking at your blood work values including BUN/ serum creratinine. Your urine was also tested and found to have features of Prerenal dysfunction in the setting of acute infection, and dehydration. You were treated with IV hydration, and your renal function improved back to your baseline. We held nephrotoxic medications such as NSAID pain killers, and were cautious about the use of IV contrast if such scans were needed. You are advised to continue to stay well hydrated, and to seek medical attention if you have painful/burning urination, blood in urine, or increasing inability to control the flow of urine.      Diagnosis: HLD (hyperlipidemia)  Assessment and Plan of Treatment: You have a history of high blood fats and were on medication at home including a ATORVASTATIN. You were continued on the statin during this hospital admission. Your blood tests showed well controlled lipid levels in your body. You are advised to continue taking this medication daily and seek medical attention if you have intense muscle aches, or reddish discoloration of the urine.      Diagnosis: Mild dementia  Assessment and Plan of Treatment: You were evaluated for possible causes of the altered mental status you presented with. 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 You were kept on cardiac monitor to check for any possible heart arrythmias and did not have any.Dementia which is an irreversible loss of brain matter due to chronic blood loss or age related changes to the brain. Unfortunately there is no cure or standard way to prevent the progression of dementia other than controlling risk factors such as your diet, and keeping your blood sugars and fats within a normal range. You are recommended to continue these medications.      Diagnosis: Weakness  Assessment and Plan of Treatment: You were assessed by PT and found to have weakness which i s requiring physical therapy to regain your strength. You were evaluated for possible causes, and it is likely due to hospital deconditioning vs post viral weakness. Due to your covid positive status, case management was involved in planning your discharge to _____.     PRINCIPAL DISCHARGE DIAGNOSIS  Diagnosis: Acute hypoxemic respiratory failure due to COVID-19  Assessment and Plan of Treatment: You came to the hospital with shortness of breath. Your COVID PCR was positive, meaning an active covid infection. You were treated with remdesivir, steroid and oxygen supplementation.   Based on your current clinical status and stability, it has been determined that you no longer need hospitalization and can recover while remaining in self-quarantine at home. You should follow the prevention steps below until a healthcare provider or local or state health department says you can return to your normal activities. Please take aspirin daily to reduce your risk of blood clots.   1. You should restrict activities outside your home, except for getting medical care.   2. Do not go to work, school, or public areas.   3. Avoid using public transportation, ride-sharing, or taxis.   4. Separate yourself from other people and animals in your home as much as possible.  When you are around other people (e.g., sharing a room or vehicle) you should wear a facemask.  5. Wash your hands often with soap and water for at least 20 seconds, especially after blowing your nose, coughing, or sneezing; going to the bathroom; and before eating or preparing food.  6. Cover your mouth and nose with a tissue when you cough or sneeze. Throw used tissues in a lined trash can. Immediately wash your hands with soap and water for at least 20 seconds  7. High touch surfaces include counters, tabletops, doorknobs, bathroom fixtures, toilets, phones, keyboards, tablets, and bedside tables.  8. Avoid sharing dishes, drinking glasses, cups, eating utensils, towels, or bedding with other people or pets in your home. After using these items, they should be washed thoroughly with soap and water. You are strongly advised to seek prompt medical attention if your illness worsens or you develop new symptoms like fever or difficulty breathing. PLEASE CHECK YOUR OXYGEN LEVEL WITH PULSE OXMETRY  3 times a day ot WHEN YOU FEEL SHORT OF BREATH, PLEASE COME TO ED IF IT'S LESS THAN 93%.        SECONDARY DISCHARGE DIAGNOSES  Diagnosis: Chest pain  Assessment and Plan of Treatment: During your admission, you had some chest pain for which you were admitted to Telemetry for cardiac evaluation. Your EKG and cardiac enzymes were within normal limits. You are recommended to continue taking your medications on time, and report to the ER if you have substernal chest pain with or without radiation to the Left shoulder that doesnt improve after you take rest. This may signal a heart attack and you need to be evaluated quickly for the best intervention and outcomes.      Diagnosis: Diabetes  Assessment and Plan of Treatment: You have Type 2 diabetes and were found to have a Hemoglobin A1c of 7.7% which shows a uncontrolled glucose levels in your body over the past 3 months. You were seen by an endocrinologist and are recommended to Start INSULIN LANTUS 16U NIGHTLY, ACTOS 45MG QD, FARXIGA QD, and SEMAGLUTIDE 14MG QD. Insulin injection training and teaching has been done.   A normal HbA1c is 5.5%, and you must make some lifestyle choices such as exercise and weight loss in order to make your body less insulin resistant. You are advised to eat foods that are low glycemic index which include beans and complex carbs, and to avoid high GI foods including white rice and potatoes. Uncontrolled sugars can lead to blindness, kidney failure, and contribute to diseases such as heart attacks and strokes. Your were not started on any medications at this hospital visit, and you are advised to keep your Primary Care Physician appointments in order to monitor and change medications as necessary.      Diagnosis: HTN (hypertension)  Assessment and Plan of Treatment: You have high blood pressure, for which you have been started on  LOSARTAN instead of your home medication of AZILSARTAN. It was initially held due to your kidney injury, but then restarted after your renal function improved. It is important to continue to take your medications on time,  monitor your blood pressure at home, keep a log of your home readings and follow up with your Primary Care Physician. As per AHA/ACC guidelines, it is important to adhere to a DASH Diet of fresh fruits, vegetables, lean meats such as poultry and fish, and whole wheat carbs. 30 minutes of aerobic exercise per day 3-4 times a week, reducing salt intake <1.5g/day, and cutting down on highly processed foods are also shown to reduce BP. Uncontrolled BP may result in organ damage to your eyes, brain, heart, and kidneys by causing strokes, heart attacks, kidney failure, and bleeds in your eye.      Diagnosis: CHINYERE (acute kidney injury)  Assessment and Plan of Treatment: You were found to have a worsening of your kidney function from your baseline of 1.2, which we diagnosed by looking at your blood work values including BUN/ serum creratinine. Your urine was also tested and found to have features of Prerenal dysfunction in the setting of acute infection, and dehydration. You were treated with IV hydration, and your renal function improved back to your baseline. We held nephrotoxic medications such as NSAID pain killers, and were cautious about the use of IV contrast if such scans were needed. You are advised to continue to stay well hydrated, and to seek medical attention if you have painful/burning urination, blood in urine, or increasing inability to control the flow of urine.      Diagnosis: HLD (hyperlipidemia)  Assessment and Plan of Treatment: You have a history of high blood fats and were on medication at home including ATORVASTATIN and REPATHA. You were continued on the statin during this hospital admission. Your blood tests showed well controlled lipid levels in your body. You are advised to continue taking this medication daily and seek medical attention if you have intense muscle aches, or reddish discoloration of the urine.      Diagnosis: Mild dementia  Assessment and Plan of Treatment: You were evaluated for possible causes of the altered mental status you presented with. 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 You were kept on cardiac monitor to check for any possible heart arrythmias and did not have any.Dementia which is an irreversible loss of brain matter due to chronic blood loss or age related changes to the brain. Unfortunately there is no cure or standard way to prevent the progression of dementia other than controlling risk factors such as your diet, and keeping your blood sugars and fats within a normal range. You are recommended to continue these medications including CYCLOSET.       Diagnosis: Weakness  Assessment and Plan of Treatment: You were assessed by PT and found to have weakness which i s requiring physical therapy to regain your strength. You were evaluated for possible causes, and it is likely due to hospital deconditioning vs post viral weakness. Due to your covid positive status, case management was involved in planning your discharge to _____.     PRINCIPAL DISCHARGE DIAGNOSIS  Diagnosis: Acute hypoxemic respiratory failure due to COVID-19  Assessment and Plan of Treatment: You came to the hospital with shortness of breath. Your COVID PCR was positive, meaning an active covid infection. You were treated with remdesivir, steroid and oxygen supplementation.   Based on your current clinical status and stability, it has been determined that you no longer need hospitalization and can recover while remaining in self-quarantine at home. You should follow the prevention steps below until a healthcare provider or local or state health department says you can return to your normal activities. Please take aspirin daily to reduce your risk of blood clots.   1. You should restrict activities outside your home, except for getting medical care.   2. Do not go to work, school, or public areas.   3. Avoid using public transportation, ride-sharing, or taxis.   4. Separate yourself from other people and animals in your home as much as possible.  When you are around other people (e.g., sharing a room or vehicle) you should wear a facemask.  5. Wash your hands often with soap and water for at least 20 seconds, especially after blowing your nose, coughing, or sneezing; going to the bathroom; and before eating or preparing food.  6. Cover your mouth and nose with a tissue when you cough or sneeze. Throw used tissues in a lined trash can. Immediately wash your hands with soap and water for at least 20 seconds  7. High touch surfaces include counters, tabletops, doorknobs, bathroom fixtures, toilets, phones, keyboards, tablets, and bedside tables.  8. Avoid sharing dishes, drinking glasses, cups, eating utensils, towels, or bedding with other people or pets in your home. After using these items, they should be washed thoroughly with soap and water. You are strongly advised to seek prompt medical attention if your illness worsens or you develop new symptoms like fever or difficulty breathing. PLEASE CHECK YOUR OXYGEN LEVEL WITH PULSE OXMETRY  3 times a day ot WHEN YOU FEEL SHORT OF BREATH, PLEASE COME TO ED IF IT'S LESS THAN 93%.        SECONDARY DISCHARGE DIAGNOSES  Diagnosis: Chest pain  Assessment and Plan of Treatment: During your admission, you had some chest pain for which you were admitted to Telemetry for cardiac evaluation. Your EKG and cardiac enzymes were within normal limits. You are recommended to continue taking your medications on time, and report to the ER if you have substernal chest pain with or without radiation to the Left shoulder that doesnt improve after you take rest. This may signal a heart attack and you need to be evaluated quickly for the best intervention and outcomes.      Diagnosis: Diabetes  Assessment and Plan of Treatment: You have Type 2 diabetes and were found to have a Hemoglobin A1c of 7.7% which shows a uncontrolled glucose levels in your body over the past 3 months. You were seen by an endocrinologist and are recommended to Start INSULIN LANTUS 16U NIGHTLY, ACTOS 45MG QD, FARXIGA QD, and SEMAGLUTIDE 14MG QD. Insulin injection training and teaching has been done.   A normal HbA1c is 5.5%, and you must make some lifestyle choices such as exercise and weight loss in order to make your body less insulin resistant. You are advised to eat foods that are low glycemic index which include beans and complex carbs, and to avoid high GI foods including white rice and potatoes. Uncontrolled sugars can lead to blindness, kidney failure, and contribute to diseases such as heart attacks and strokes. Your were not started on any medications at this hospital visit, and you are advised to keep your Primary Care Physician appointments in order to monitor and change medications as necessary.      Diagnosis: HTN (hypertension)  Assessment and Plan of Treatment: You have high blood pressure, for which you have been started on  LOSARTAN instead of your home medication of AZILSARTAN. It was initially held due to your kidney injury, but then restarted after your renal function improved. It is important to continue to take your medications on time,  monitor your blood pressure at home, keep a log of your home readings and follow up with your Primary Care Physician. As per AHA/ACC guidelines, it is important to adhere to a DASH Diet of fresh fruits, vegetables, lean meats such as poultry and fish, and whole wheat carbs. 30 minutes of aerobic exercise per day 3-4 times a week, reducing salt intake <1.5g/day, and cutting down on highly processed foods are also shown to reduce BP. Uncontrolled BP may result in organ damage to your eyes, brain, heart, and kidneys by causing strokes, heart attacks, kidney failure, and bleeds in your eye.      Diagnosis: CHINYERE (acute kidney injury)  Assessment and Plan of Treatment: You were found to have a worsening of your kidney function from your baseline of 1.2, which we diagnosed by looking at your blood work values including BUN/ serum creratinine. Your urine was also tested and found to have features of Prerenal dysfunction in the setting of acute infection, and dehydration. You were treated with IV hydration, and your renal function improved back to your baseline. We held nephrotoxic medications such as NSAID pain killers, and were cautious about the use of IV contrast if such scans were needed. You are advised to continue to stay well hydrated, and to seek medical attention if you have painful/burning urination, blood in urine, or increasing inability to control the flow of urine.      Diagnosis: HLD (hyperlipidemia)  Assessment and Plan of Treatment: You have a history of high blood fats and were on medication at home including ATORVASTATIN and REPATHA (LAST DOSE 12/22/20, NEXT DOSE 1/22/21). You were continued on the statin during this hospital admission. Your blood tests showed well controlled lipid levels in your body. You are advised to continue taking this medication daily and seek medical attention if you have intense muscle aches, or reddish discoloration of the urine.      Diagnosis: Mild dementia  Assessment and Plan of Treatment: You were evaluated for possible causes of the altered mental status you presented with. 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 You were kept on cardiac monitor to check for any possible heart arrythmias and did not have any.Dementia which is an irreversible loss of brain matter due to chronic blood loss or age related changes to the brain. Unfortunately there is no cure or standard way to prevent the progression of dementia other than controlling risk factors such as your diet, and keeping your blood sugars and fats within a normal range. You are recommended to continue these medications including CYCLOSET.       Diagnosis: Weakness  Assessment and Plan of Treatment: You were assessed by PT and found to have weakness which i s requiring physical therapy to regain your strength. You were evaluated for possible causes, and it is likely due to hospital deconditioning vs post viral weakness. Due to your covid positive status, case management was involved in planning your discharge to [facility].     PRINCIPAL DISCHARGE DIAGNOSIS  Diagnosis: Acute hypoxemic respiratory failure due to COVID-19  Assessment and Plan of Treatment: You came to the hospital with shortness of breath. Your COVID PCR was positive, meaning an active covid infection. You were treated with remdesivir, steroid and oxygen supplementation.   Based on your current clinical status and stability, it has been determined that you no longer need hospitalization and can recover while remaining in self-quarantine at home. You should follow the prevention steps below until a healthcare provider or local or state health department says you can return to your normal activities. Please take aspirin daily to reduce your risk of blood clots.   1. You should restrict activities outside your home, except for getting medical care.   2. Do not go to work, school, or public areas.   3. Avoid using public transportation, ride-sharing, or taxis.   4. Separate yourself from other people and animals in your home as much as possible.  When you are around other people (e.g., sharing a room or vehicle) you should wear a facemask.  5. Wash your hands often with soap and water for at least 20 seconds, especially after blowing your nose, coughing, or sneezing; going to the bathroom; and before eating or preparing food.  6. Cover your mouth and nose with a tissue when you cough or sneeze. Throw used tissues in a lined trash can. Immediately wash your hands with soap and water for at least 20 seconds  7. High touch surfaces include counters, tabletops, doorknobs, bathroom fixtures, toilets, phones, keyboards, tablets, and bedside tables.  8. Avoid sharing dishes, drinking glasses, cups, eating utensils, towels, or bedding with other people or pets in your home. After using these items, they should be washed thoroughly with soap and water. You are strongly advised to seek prompt medical attention if your illness worsens or you develop new symptoms like fever or difficulty breathing. PLEASE CHECK YOUR OXYGEN LEVEL WITH PULSE OXMETRY  3 times a day ot WHEN YOU FEEL SHORT OF BREATH, PLEASE COME TO ED IF IT'S LESS THAN 93%.        SECONDARY DISCHARGE DIAGNOSES  Diagnosis: Chest pain  Assessment and Plan of Treatment: During your admission, you had some chest pain for which you were admitted to Telemetry for cardiac evaluation. Your EKG and cardiac enzymes were within normal limits. You are recommended to continue taking your medications on time, and report to the ER if you have substernal chest pain with or without radiation to the Left shoulder that doesnt improve after you take rest. This may signal a heart attack and you need to be evaluated quickly for the best intervention and outcomes.      Diagnosis: Diabetes  Assessment and Plan of Treatment: You have Type 2 diabetes and were found to have a Hemoglobin A1c of 7.7% which shows a uncontrolled glucose levels in your body over the past 3 months. You were seen by an endocrinologist and are recommended to start INSULIN LANTUS 16U NIGHTLY, and SEMAGLUTIDE 14MG QD. Insulin injection training and teaching has been done. Endo evaluated and recommended stopping ACTOS 45MG QD, FARXIGA QD, GlIPIZIDE.   A normal HbA1c is 5.5%, and you must make some lifestyle choices such as exercise and weight loss in order to make your body less insulin resistant. You are advised to eat foods that are low glycemic index which include beans and complex carbs, and to avoid high GI foods including white rice and potatoes. Uncontrolled sugars can lead to blindness, kidney failure, and contribute to diseases such as heart attacks and strokes. Your were not started on any medications at this hospital visit, and you are advised to keep your Primary Care Physician appointments in order to monitor and change medications as necessary.      Diagnosis: HTN (hypertension)  Assessment and Plan of Treatment: You have high blood pressure, for which you have been started on  LOSARTAN instead of your home medication of AZILSARTAN. It was initially held due to your kidney injury, but then restarted after your renal function improved. It is important to continue to take your medications on time,  monitor your blood pressure at home, keep a log of your home readings and follow up with your Primary Care Physician. As per AHA/ACC guidelines, it is important to adhere to a DASH Diet of fresh fruits, vegetables, lean meats such as poultry and fish, and whole wheat carbs. 30 minutes of aerobic exercise per day 3-4 times a week, reducing salt intake <1.5g/day, and cutting down on highly processed foods are also shown to reduce BP. Uncontrolled BP may result in organ damage to your eyes, brain, heart, and kidneys by causing strokes, heart attacks, kidney failure, and bleeds in your eye.      Diagnosis: CHINYERE (acute kidney injury)  Assessment and Plan of Treatment: You were found to have a worsening of your kidney function from your baseline of 1.2, which we diagnosed by looking at your blood work values including BUN/ serum creratinine. Your urine was also tested and found to have features of Prerenal dysfunction in the setting of acute infection, and dehydration. You were treated with IV hydration, and your renal function improved back to your baseline. We held nephrotoxic medications such as NSAID pain killers, and were cautious about the use of IV contrast if such scans were needed. You are advised to continue to stay well hydrated, and to seek medical attention if you have painful/burning urination, blood in urine, or increasing inability to control the flow of urine.      Diagnosis: HLD (hyperlipidemia)  Assessment and Plan of Treatment: You have a history of high blood fats and were on medication at home including ATORVASTATIN and REPATHA (LAST DOSE 12/22/20, NEXT DOSE 1/22/21). You were continued on the statin during this hospital admission. Your blood tests showed well controlled lipid levels in your body. You are advised to continue taking this medication daily and seek medical attention if you have intense muscle aches, or reddish discoloration of the urine.      Diagnosis: Mild dementia  Assessment and Plan of Treatment: You were evaluated for possible causes of the altered mental status you presented with. 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 You were kept on cardiac monitor to check for any possible heart arrythmias and did not have any.Dementia which is an irreversible loss of brain matter due to chronic blood loss or age related changes to the brain. Unfortunately there is no cure or standard way to prevent the progression of dementia other than controlling risk factors such as your diet, and keeping your blood sugars and fats within a normal range. You are recommended to continue these medications including CYCLOSET.       Diagnosis: Weakness  Assessment and Plan of Treatment: You were assessed by PT and found to have weakness which i s requiring physical therapy to regain your strength. You were evaluated for possible causes, and it is likely due to hospital deconditioning vs post viral weakness. Due to your covid positive status, case management was involved in planning your discharge to [facility].

## 2021-01-08 NOTE — DISCHARGE NOTE PROVIDER - CARE PROVIDER_API CALL
Dima Hadley  INTERNAL MEDICINE  [address] [place] [address]  Phone: [phone]  Fax: [phone]  Follow Up Time: 1 month

## 2021-01-08 NOTE — PROGRESS NOTE ADULT - ASSESSMENT
85 year old male with significant medical history of Dm, HLD, HTn and surgical history of appendectomy presenting to the ED with complaints of abdominal pain and intermittent chest pain for the last 7 days.    ED:  COVID positive   CXR b/l infiltrate  Saturating 89%on RA    Patient is being admitted to tele to r/o ACS, NSTEMi    ******Asked patient regarding medications, he did not remember them. Reached out to son, he did not know either pharmcy or medications name. He said to call back in few hours, so that he can reach home and find out. I called him back at 6Pm, he gave me pharmacy name, but did not know medications. I tried reaching pharmacy, it is closed. I tried reaching PCP,  answered the call and put me on hold for 10 minutes and replied back saying doctor is busy. I entered medications from EMS record in OP med rec. Please call pharmacy in AM if possible to get medications****     85 year old male with significant medical history of Dm, HLD, HTn and surgical history of appendectomy presenting to the ED with complaints of abdominal pain and intermittent chest pain for the last 7 days.    ED:  COVID positive   CXR b/l infiltrate  Saturating 89%on RA    Patient is being admitted to tele to r/o ACS, NSTEMi

## 2021-01-08 NOTE — PROGRESS NOTE ADULT - PROBLEM SELECTOR PLAN 4
presented with elevated cr  Likely due to dehydration, holding IV fluids in view of covid  f/u urine lytes  Monitor BMP  if cr clearance decreases please hold remdesivir  avoid nephrotoxins presented with elevated cr  Monitor BMP  if cr clearance decreases please hold remdesivir  avoid nephrotoxins

## 2021-01-08 NOTE — DISCHARGE NOTE PROVIDER - HOSPITAL COURSE
85 year old male with significant medical history of Dm, HLD, HTn, BPH and surgical history of appendectomy presenting to the ED with complaints of abdominal pain and intermittent chest pain for the last 7 days. He had fever 7 days ago, since then he is feeling weak, had two falls, one was 3 days ago and the other was yesterday. He denies LOC, syncope or prodromal symptoms. He says he hit his head at back. Reports that the chest pain is on and off, 5/10, non radiating, feels like pressure and tightness, associated with breathing difficulty. Relates that he has not been able to eat, having loss of appetite, but does feel thirsty. Denies significant weight loss, cough, abdominal pain, nausea, vomiting, headache, visual changes, or any other acute complaints.  patient was found to be COVID positive , started on decadron ,remdisivir and supportive measures.    85 year old male with significant medical history of Dm, HLD, HTn, BPH and surgical history of appendectomy presenting to the ED with complaints of abdominal pain and intermittent chest pain for the last 7 days. He had fever 7 days ago, since then he is feeling weak, had two falls, one was 3 days ago and the other was yesterday. He denies LOC, syncope or prodromal symptoms. He says he hit his head at back. Reports that the chest pain is on and off, 5/10, non radiating, feels like pressure and tightness, associated with breathing difficulty. Relates that he has not been able to eat, having loss of appetite, but does feel thirsty. Denies significant weight loss, cough, abdominal pain, nausea, vomiting, headache, visual changes, or any other acute complaints. Patient was found to be COVID positive , started on decadron ,remdisivir and supportive measures.    85 year old male with significant medical history of Dm, HLD, HTn, BPH and surgical history of appendectomy presenting to the ED with complaints of abdominal pain and intermittent chest pain for the last 7 days. He had fever 7 days ago, since then he is feeling weak, had two falls, one was 3 days ago and the other was yesterday. He denies LOC, syncope or prodromal symptoms. He says he hit his head at back. Reports that the chest pain is on and off, 5/10, non radiating, feels like pressure and tightness, associated with breathing difficulty. Relates that he has not been able to eat, having loss of appetite, but does feel thirsty. Denies significant weight loss, cough, abdominal pain, nausea, vomiting, headache, visual changes, or any other acute complaints. Patient was found to be COVID positive , started on decadron ,remdisivir and supportive measures. Patient symptomatically improved and was able to  be tapered off 02. He was evaluated by PT and found to require some PT at home to increase ADL's. Discharge planning was done to discharge him to Home with [facility]/ [facility]IE. At discharge, pt is saturating well on room air. He had poor insight about his health and he gave consent for his Son Priscilla to make health related decisions. Given patient's improved clinical status and current hemodynamic stability, decision was made to discharge after discussing with attending physician. Please refer to patient's complete medical chart with documents for a full hospital course, for this is only a brief summary.   85 year old male with significant medical history of Dm, HLD, HTn, BPH and surgical history of appendectomy presenting to the ED with complaints of abdominal pain and intermittent chest pain for the last 7 days. He had fever 7 days ago, since then he is feeling weak, had two falls, one was 3 days ago and the other was yesterday. He denies LOC, syncope or prodromal symptoms. He says he hit his head at back. Reports that the chest pain is on and off, 5/10, non radiating, feels like pressure and tightness, associated with breathing difficulty. Relates that he has not been able to eat, having loss of appetite, but does feel thirsty. Denies significant weight loss, cough, abdominal pain, nausea, vomiting, headache, visual changes, or any other acute complaints. Patient was found to be COVID positive , started on decadron ,remdisivir and supportive measures. Patient symptomatically improved and was able to  be tapered off 02. He was evaluated by PT and found to require some PT at home to increase ADL's. Discharge planning was done to discharge him to [facility]. At discharge, pt is saturating well on room air. He had poor insight about his health and he gave consent for his Son Priscilla to make health related decisions. Given patient's improved clinical status and current hemodynamic stability, decision was made to discharge after discussing with attending physician. Please refer to patient's complete medical chart with documents for a full hospital course, for this is only a brief summary.   85 year old male with significant medical history of Dm, HLD, HTn, BPH and surgical history of appendectomy presenting to the ED with complaints of abdominal pain and intermittent chest pain for the last 7 days. He had fever 7 days ago, since then he is feeling weak, had two falls, one was 3 days ago and the other was yesterday. He denies LOC, syncope or prodromal symptoms. He says he hit his head at back. Reports that the chest pain is on and off, 5/10, non radiating, feels like pressure and tightness, associated with breathing difficulty. Relates that he has not been able to eat, having loss of appetite, but does feel thirsty. Denies significant weight loss, cough, abdominal pain, nausea, vomiting, headache, visual changes, or any other acute complaints. Patient was found to be COVID positive , started on decadron ,remdisivir and supportive measures. Patient symptomatically improved and was able to  be tapered off 02. He was evaluated by PT and found to require some PT at home to increase ADL's. Discharge planning was done to discharge him to [facility]. At discharge, pt is saturating well on room air. He had poor insight about his health and he gave consent for his Son Priscilla to make health related decisions. Given patient's improved clinical status and current hemodynamic stability, decision was made to discharge after discussing with attending physician. Please refer to patient's complete medical chart with documents for a full hospital course, for this is only a brief summary.  Due for next Repatha S/c injection 1/22/21.

## 2021-01-08 NOTE — PROGRESS NOTE ADULT - PROBLEM SELECTOR PLAN 2
Presented with respiratory status and chest pain  Saturating 89% on Ra  Saturation improved to 94% on 2L NC  COVID positive  f/u inflammatory markers  Will start on decadron   Will give one dose of remdesivir, check cr cl tomorrow and resume other doses as cr cl is borderline today  Monitor respiratory status  O2 supplement as needed Presented with respiratory status and chest pain  Saturating 89% on Ra  Saturation improved to 90% on 4L NC  COVID positive  f/u inflammatory markers   on decadron and remdisivir day 2   will continue on remdisivir until Ab results  Monitor respiratory status  O2 supplement as needed

## 2021-01-09 LAB
ALBUMIN SERPL ELPH-MCNC: 2.3 G/DL — LOW (ref 3.5–5)
ALBUMIN SERPL ELPH-MCNC: 2.3 G/DL — LOW (ref 3.5–5)
ALP SERPL-CCNC: 43 U/L — SIGNIFICANT CHANGE UP (ref 40–120)
ALP SERPL-CCNC: 44 U/L — SIGNIFICANT CHANGE UP (ref 40–120)
ALT FLD-CCNC: 43 U/L DA — SIGNIFICANT CHANGE UP (ref 10–60)
ALT FLD-CCNC: 44 U/L DA — SIGNIFICANT CHANGE UP (ref 10–60)
ANION GAP SERPL CALC-SCNC: 11 MMOL/L — SIGNIFICANT CHANGE UP (ref 5–17)
AST SERPL-CCNC: 79 U/L — HIGH (ref 10–40)
AST SERPL-CCNC: 79 U/L — HIGH (ref 10–40)
BILIRUB DIRECT SERPL-MCNC: 0.2 MG/DL — SIGNIFICANT CHANGE UP (ref 0–0.2)
BILIRUB INDIRECT FLD-MCNC: 0.5 MG/DL — SIGNIFICANT CHANGE UP (ref 0.2–1)
BILIRUB SERPL-MCNC: 0.7 MG/DL — SIGNIFICANT CHANGE UP (ref 0.2–1.2)
BILIRUB SERPL-MCNC: 0.8 MG/DL — SIGNIFICANT CHANGE UP (ref 0.2–1.2)
BUN SERPL-MCNC: 40 MG/DL — HIGH (ref 7–18)
CALCIUM SERPL-MCNC: 8.5 MG/DL — SIGNIFICANT CHANGE UP (ref 8.4–10.5)
CHLORIDE SERPL-SCNC: 108 MMOL/L — SIGNIFICANT CHANGE UP (ref 96–108)
CO2 SERPL-SCNC: 20 MMOL/L — LOW (ref 22–31)
CREAT SERPL-MCNC: 1.54 MG/DL — HIGH (ref 0.5–1.3)
CULTURE RESULTS: SIGNIFICANT CHANGE UP
GLUCOSE BLDC GLUCOMTR-MCNC: 271 MG/DL — HIGH (ref 70–99)
GLUCOSE BLDC GLUCOMTR-MCNC: 272 MG/DL — HIGH (ref 70–99)
GLUCOSE BLDC GLUCOMTR-MCNC: 276 MG/DL — HIGH (ref 70–99)
GLUCOSE BLDC GLUCOMTR-MCNC: 325 MG/DL — HIGH (ref 70–99)
GLUCOSE SERPL-MCNC: 323 MG/DL — HIGH (ref 70–99)
HCT VFR BLD CALC: 43 % — SIGNIFICANT CHANGE UP (ref 39–50)
HGB BLD-MCNC: 14.6 G/DL — SIGNIFICANT CHANGE UP (ref 13–17)
MAGNESIUM SERPL-MCNC: 2.5 MG/DL — SIGNIFICANT CHANGE UP (ref 1.6–2.6)
MCHC RBC-ENTMCNC: 31.4 PG — SIGNIFICANT CHANGE UP (ref 27–34)
MCHC RBC-ENTMCNC: 34 GM/DL — SIGNIFICANT CHANGE UP (ref 32–36)
MCV RBC AUTO: 92.5 FL — SIGNIFICANT CHANGE UP (ref 80–100)
NRBC # BLD: 0 /100 WBCS — SIGNIFICANT CHANGE UP (ref 0–0)
PHOSPHATE SERPL-MCNC: 3.4 MG/DL — SIGNIFICANT CHANGE UP (ref 2.5–4.5)
PLATELET # BLD AUTO: 212 K/UL — SIGNIFICANT CHANGE UP (ref 150–400)
POTASSIUM SERPL-MCNC: 4.7 MMOL/L — SIGNIFICANT CHANGE UP (ref 3.5–5.3)
POTASSIUM SERPL-SCNC: 4.7 MMOL/L — SIGNIFICANT CHANGE UP (ref 3.5–5.3)
PROT SERPL-MCNC: 5.7 G/DL — LOW (ref 6–8.3)
PROT SERPL-MCNC: 5.8 G/DL — LOW (ref 6–8.3)
RBC # BLD: 4.65 M/UL — SIGNIFICANT CHANGE UP (ref 4.2–5.8)
RBC # FLD: 13.8 % — SIGNIFICANT CHANGE UP (ref 10.3–14.5)
SARS-COV-2 IGG SERPL QL IA: NEGATIVE — SIGNIFICANT CHANGE UP
SARS-COV-2 IGM SERPL IA-ACNC: 1.01 INDEX — SIGNIFICANT CHANGE UP
SODIUM SERPL-SCNC: 139 MMOL/L — SIGNIFICANT CHANGE UP (ref 135–145)
SPECIMEN SOURCE: SIGNIFICANT CHANGE UP
WBC # BLD: 12.36 K/UL — HIGH (ref 3.8–10.5)
WBC # FLD AUTO: 12.36 K/UL — HIGH (ref 3.8–10.5)

## 2021-01-09 RX ORDER — ACETAMINOPHEN 500 MG
650 TABLET ORAL EVERY 6 HOURS
Refills: 0 | Status: DISCONTINUED | OUTPATIENT
Start: 2021-01-09 | End: 2021-01-20

## 2021-01-09 RX ORDER — INSULIN LISPRO 100/ML
2 VIAL (ML) SUBCUTANEOUS
Refills: 0 | Status: DISCONTINUED | OUTPATIENT
Start: 2021-01-09 | End: 2021-01-11

## 2021-01-09 RX ORDER — SODIUM CHLORIDE 9 MG/ML
1000 INJECTION, SOLUTION INTRAVENOUS
Refills: 0 | Status: DISCONTINUED | OUTPATIENT
Start: 2021-01-09 | End: 2021-01-10

## 2021-01-09 RX ADMIN — Medication 4: at 11:37

## 2021-01-09 RX ADMIN — Medication 2 UNIT(S): at 16:49

## 2021-01-09 RX ADMIN — Medication 6 MILLIGRAM(S): at 05:33

## 2021-01-09 RX ADMIN — Medication 81 MILLIGRAM(S): at 11:35

## 2021-01-09 RX ADMIN — INSULIN GLARGINE 10 UNIT(S): 100 INJECTION, SOLUTION SUBCUTANEOUS at 21:46

## 2021-01-09 RX ADMIN — SODIUM CHLORIDE 50 MILLILITER(S): 9 INJECTION, SOLUTION INTRAVENOUS at 11:35

## 2021-01-09 RX ADMIN — Medication 3: at 08:20

## 2021-01-09 RX ADMIN — TAMSULOSIN HYDROCHLORIDE 0.4 MILLIGRAM(S): 0.4 CAPSULE ORAL at 21:47

## 2021-01-09 RX ADMIN — HEPARIN SODIUM 5000 UNIT(S): 5000 INJECTION INTRAVENOUS; SUBCUTANEOUS at 05:33

## 2021-01-09 RX ADMIN — ATORVASTATIN CALCIUM 40 MILLIGRAM(S): 80 TABLET, FILM COATED ORAL at 21:47

## 2021-01-09 RX ADMIN — REMDESIVIR 500 MILLIGRAM(S): 5 INJECTION INTRAVENOUS at 21:46

## 2021-01-09 RX ADMIN — HEPARIN SODIUM 5000 UNIT(S): 5000 INJECTION INTRAVENOUS; SUBCUTANEOUS at 21:46

## 2021-01-09 RX ADMIN — LOSARTAN POTASSIUM 25 MILLIGRAM(S): 100 TABLET, FILM COATED ORAL at 05:33

## 2021-01-09 RX ADMIN — PANTOPRAZOLE SODIUM 40 MILLIGRAM(S): 20 TABLET, DELAYED RELEASE ORAL at 05:34

## 2021-01-09 RX ADMIN — HEPARIN SODIUM 5000 UNIT(S): 5000 INJECTION INTRAVENOUS; SUBCUTANEOUS at 13:18

## 2021-01-09 RX ADMIN — Medication 2 UNIT(S): at 11:37

## 2021-01-09 RX ADMIN — Medication 3: at 16:49

## 2021-01-09 NOTE — PROGRESS NOTE ADULT - PROBLEM SELECTOR PLAN 2
Presented with respiratory status and chest pain  Saturating 89% on Ra  Saturation improved to 90% on 4L NC  COVID positive  f/u inflammatory markers   on decadron and remdisivir day 2   will continue on remdisivir until Ab results  Monitor respiratory status  O2 supplement as needed Presented with respiratory status and chest pain  he is saturating 95% on 4 L NC  COVID positive  f/u inflammatory markers   on decadron and remdisivir day 3  will continue on remdisivir until Ab results  Monitor respiratory status  O2 supplement as needed

## 2021-01-09 NOTE — PROGRESS NOTE ADULT - SUBJECTIVE AND OBJECTIVE BOX
PGY-1 Progress Note discussed with attending    PAGER #: [[phone]] TILL 5:00 PM  PLEASE CONTACT ON CALL TEAM:  - On Call Team (Please refer to Jag) FROM 5:00 PM - 8:30PM  - Nightfloat Team FROM 8:30 -7:30 AM    CHIEF COMPLAINT & BRIEF HOSPITAL COURSE:    INTERVAL HPI/OVERNIGHT EVENTS:       REVIEW OF SYSTEMS:  CONSTITUTIONAL: No fever, weight loss, or fatigue  RESPIRATORY: No cough, wheezing, chills or hemoptysis; No shortness of breath  CARDIOVASCULAR: No chest pain, palpitations, dizziness, or leg swelling  GASTROINTESTINAL: No abdominal pain. No nausea, vomiting, or hematemesis; No diarrhea or constipation. No melena or hematochezia.  GENITOURINARY: No dysuria or hematuria, urinary frequency  NEUROLOGICAL: No headaches, memory loss, loss of strength, numbness, or tremors  SKIN: No itching, burning, rashes, or lesions     MEDICATIONS  (STANDING):  aspirin enteric coated 81 milliGRAM(s) Oral daily  atorvastatin 40 milliGRAM(s) Oral at bedtime  dexAMETHasone  Injectable 6 milliGRAM(s) IV Push daily  heparin   Injectable 5000 Unit(s) SubCutaneous every 8 hours  insulin glargine Injectable (LANTUS) 10 Unit(s) SubCutaneous at bedtime  insulin lispro (ADMELOG) corrective regimen sliding scale   SubCutaneous three times a day before meals  insulin lispro Injectable (ADMELOG) 2 Unit(s) SubCutaneous three times a day before meals  losartan 25 milliGRAM(s) Oral daily  pantoprazole    Tablet 40 milliGRAM(s) Oral before breakfast  remdesivir  IVPB 100 milliGRAM(s) IV Intermittent every 24 hours  sodium chloride 0.45%. 1000 milliLiter(s) (70 mL/Hr) IV Continuous <Continuous>  tamsulosin 0.4 milliGRAM(s) Oral at bedtime    MEDICATIONS  (PRN):      Vital Signs Last 24 Hrs  T(C): 36.7 (09 Jan 2021 07:40), Max: 36.8 (08 Jan 2021 11:21)  T(F): 98 (09 Jan 2021 07:40), Max: 98.2 (08 Jan 2021 11:21)  HR: 78 (09 Jan 2021 07:40) (76 - 83)  BP: 118/67 (09 Jan 2021 07:40) (113/71 - 136/77)  BP(mean): --  RR: 18 (09 Jan 2021 07:40) (18 - 20)  SpO2: 95% (09 Jan 2021 07:40) (90% - 96%)    PHYSICAL EXAMINATION:  GENERAL: NAD, well built  HEAD:  Atraumatic, Normocephalic  EYES:  conjunctiva and sclera clear  NECK: Supple, No JVD, Normal thyroid  CHEST/LUNG: Clear to auscultation. Clear to percussion bilaterally; No rales, rhonchi, wheezing, or rubs  HEART: Regular rate and rhythm; No murmurs, rubs, or gallops  ABDOMEN: Soft, Nontender, Nondistended; Bowel sounds present  NERVOUS SYSTEM:  Alert & Oriented X3,    EXTREMITIES:  2+ Peripheral Pulses, No clubbing, cyanosis, or edema  SKIN: warm dry                          14.6   12.36 )-----------( 212      ( 09 Jan 2021 06:55 )             43.0     01-09    139  |  108  |  40<H>  ----------------------------<  323<H>  4.7   |  20<L>  |  1.54<H>    Ca    8.5      09 Jan 2021 06:55  Phos  3.4     01-09  Mg     2.5     01-09    TPro  5.7<L>  /  Alb  2.3<L>  /  TBili  0.8  /  DBili  0.2  /  AST  79<H>  /  ALT  44  /  AlkPhos  44  01-09    LIVER FUNCTIONS - ( 09 Jan 2021 06:55 )  Alb: 2.3 g/dL / Pro: 5.7 g/dL / ALK PHOS: 44 U/L / ALT: 44 U/L DA / AST: 79 U/L / GGT: x           CARDIAC MARKERS ( 07 Jan 2021 19:11 )  0.028 ng/mL / x     / 1162 U/L / x     / <1.0 ng/mL  CARDIAC MARKERS ( 07 Jan 2021 11:34 )  0.026 ng/mL / x     / 1350 U/L / x     / x          PT/INR - ( 07 Jan 2021 19:11 )   PT: 13.2 sec;   INR: 1.12 ratio                 CAPILLARY BLOOD GLUCOSE  CAPILLARY BLOOD GLUCOSE      POCT Blood Glucose.: 276 mg/dL (09 Jan 2021 07:54)    CAPILLARY BLOOD GLUCOSE      POCT Blood Glucose.: 276 mg/dL (09 Jan 2021 07:54)  POCT Blood Glucose.: 288 mg/dL (08 Jan 2021 21:19)  POCT Blood Glucose.: 272 mg/dL (08 Jan 2021 16:49)  POCT Blood Glucose.: 296 mg/dL (08 Jan 2021 11:45)      RADIOLOGY & ADDITIONAL TESTS:                   PGY-1 Progress Note discussed with attending    PAGER #: [[phone]] TILL 5:00 PM  PLEASE CONTACT ON CALL TEAM:  - On Call Team (Please refer to Jag) FROM 5:00 PM - 8:30PM  - Nightfloat Team FROM 8:30 -7:30 AM    CHIEF COMPLAINT & BRIEF HOSPITAL COURSE:85 year old male with significant medical history of Dm, HLD, HTn, BPH and surgical history of appendectomy presenting to the ED with complaints of abdominal pain and intermittent chest pain for the last 7 days. He had fever 7 days ago, since then he is feeling weak, had two falls, one was 3 days ago and the other was yesterday. He denies LOC, syncope or prodromal symptoms. He says he hit his head at back. Reports that the chest pain is on and off, 5/10, non radiating, feels like pressure and tightness, associated with breathing difficulty. Relates that he has not been able to eat, having loss of appetite, but does feel thirsty. Denies significant weight loss, cough, abdominal pain, nausea, vomiting, headache, visual changes, or any other acute complaints.  patient was found to be COVID positive , started on decadron ,remdisivir and supportive measures.       INTERVAL HPI/OVERNIGHT EVENTS:   patient examined bed side, he is saturating 95% on 4 L NC . he is comfortable, Georgian speaker ,  ID [id number] Isabella.       REVIEW OF SYSTEMS:  CONSTITUTIONAL: No fever, weight loss, or fatigue  RESPIRATORY: SOB  CARDIOVASCULAR: No chest pain, palpitations, dizziness, or leg swelling  GASTROINTESTINAL: No abdominal pain. No nausea, vomiting, or hematemesis; No diarrhea or constipation. No melena or hematochezia.  GENITOURINARY: No dysuria or hematuria, urinary frequency  NEUROLOGICAL: No headaches, memory loss, loss of strength, numbness, or tremors  SKIN: No itching, burning, rashes, or lesions     MEDICATIONS  (STANDING):  aspirin enteric coated 81 milliGRAM(s) Oral daily  atorvastatin 40 milliGRAM(s) Oral at bedtime  dexAMETHasone  Injectable 6 milliGRAM(s) IV Push daily  heparin   Injectable 5000 Unit(s) SubCutaneous every 8 hours  insulin glargine Injectable (LANTUS) 10 Unit(s) SubCutaneous at bedtime  insulin lispro (ADMELOG) corrective regimen sliding scale   SubCutaneous three times a day before meals  insulin lispro Injectable (ADMELOG) 2 Unit(s) SubCutaneous three times a day before meals  losartan 25 milliGRAM(s) Oral daily  pantoprazole    Tablet 40 milliGRAM(s) Oral before breakfast  remdesivir  IVPB 100 milliGRAM(s) IV Intermittent every 24 hours  sodium chloride 0.45%. 1000 milliLiter(s) (70 mL/Hr) IV Continuous <Continuous>  tamsulosin 0.4 milliGRAM(s) Oral at bedtime    MEDICATIONS  (PRN):      Vital Signs Last 24 Hrs  T(C): 36.7 (09 Jan 2021 07:40), Max: 36.8 (08 Jan 2021 11:21)  T(F): 98 (09 Jan 2021 07:40), Max: 98.2 (08 Jan 2021 11:21)  HR: 78 (09 Jan 2021 07:40) (76 - 83)  BP: 118/67 (09 Jan 2021 07:40) (113/71 - 136/77)  BP(mean): --  RR: 18 (09 Jan 2021 07:40) (18 - 20)  SpO2: 95% (09 Jan 2021 07:40) (90% - 96%)    PHYSICAL EXAMINATION:  GENERAL: NAD, on NC   HEAD:  Atraumatic, Normocephalic  EYES:  conjunctiva and sclera clear  NECK: Supple, No JVD, Normal thyroid  CHEST/LUNG: Clear to auscultation. Clear to percussion bilaterally; No rales, rhonchi, wheezing, or rubs  HEART: Regular rate and rhythm; No murmurs, rubs, or gallops  ABDOMEN: Soft, Nontender, Nondistended; Bowel sounds present  NERVOUS SYSTEM:  Alert & Oriented X3,    EXTREMITIES:  2+ Peripheral Pulses, No clubbing, cyanosis, or edema  SKIN: warm dry                          14.6   12.36 )-----------( 212      ( 09 Jan 2021 06:55 )             43.0     01-09    139  |  108  |  40<H>  ----------------------------<  323<H>  4.7   |  20<L>  |  1.54<H>    Ca    8.5      09 Jan 2021 06:55  Phos  3.4     01-09  Mg     2.5     01-09    TPro  5.7<L>  /  Alb  2.3<L>  /  TBili  0.8  /  DBili  0.2  /  AST  79<H>  /  ALT  44  /  AlkPhos  44  01-09    LIVER FUNCTIONS - ( 09 Jan 2021 06:55 )  Alb: 2.3 g/dL / Pro: 5.7 g/dL / ALK PHOS: 44 U/L / ALT: 44 U/L DA / AST: 79 U/L / GGT: x           CARDIAC MARKERS ( 07 Jan 2021 19:11 )  0.028 ng/mL / x     / 1162 U/L / x     / <1.0 ng/mL  CARDIAC MARKERS ( 07 Jan 2021 11:34 )  0.026 ng/mL / x     / 1350 U/L / x     / x          PT/INR - ( 07 Jan 2021 19:11 )   PT: 13.2 sec;   INR: 1.12 ratio                 CAPILLARY BLOOD GLUCOSE  CAPILLARY BLOOD GLUCOSE      POCT Blood Glucose.: 276 mg/dL (09 Jan 2021 07:54)    CAPILLARY BLOOD GLUCOSE      POCT Blood Glucose.: 276 mg/dL (09 Jan 2021 07:54)  POCT Blood Glucose.: 288 mg/dL (08 Jan 2021 21:19)  POCT Blood Glucose.: 272 mg/dL (08 Jan 2021 16:49)  POCT Blood Glucose.: 296 mg/dL (08 Jan 2021 11:45)      RADIOLOGY & ADDITIONAL TESTS:

## 2021-01-09 NOTE — PROGRESS NOTE ADULT - ASSESSMENT
85 year old male with significant medical history of Dm, HLD, HTn and surgical history of appendectomy presenting to the ED with complaints of abdominal pain and intermittent chest pain for the last 7 days.    ED:  COVID positive   CXR b/l infiltrate  Saturating 89%on RA    Patient is being admitted to tele to r/o ACS, NSTEMi

## 2021-01-09 NOTE — CHART NOTE - NSCHARTNOTEFT_GEN_A_CORE
Patient noted to be hypothermic 95.9 on Oral temp. Given another blanket by nursing. VS: 130/77 HR 83. RR 20 and 95% on 4L NC  - ordered hyperthermia blanket  - requested rectal temp Patient noted to be hypothermic 95.9 on Oral temp. Given another blanket by nursing. VS: 130/77 HR 83. RR 20 and 95% on 4L NC  - requested rectal temp which showed 96.8 F  - ordered hyperthermia blanket; held at bedside for now.    To follow:  - start hyperthermia blanket if needed

## 2021-01-09 NOTE — PROGRESS NOTE ADULT - SUBJECTIVE AND OBJECTIVE BOX
[facility] NEPHROLOGY- PROGRESS NOTE    Patient is a 86yo Montenegrin speaking Male with DM, HTN, BPH, HLD p/w abd pain and intermittent chest pain x 7 days. Found to be +COVID-19. Nephrology consulted for Elevated serum creatinine.co    REVIEW OF SYSTEMS:  Gen: no changes in weight  Cards: no chest pain  Resp: + dyspnea improving  GI: no nausea or vomiting or diarrhea  Vascular: no LE edema    No Known Allergies      Hospital Medications: Medications reviewed    VITALS:  T(F): 98 (01-09-21 @ 07:40), Max: 98.2 (01-08-21 @ 11:21)  HR: 78 (01-09-21 @ 07:40)  BP: 118/67 (01-09-21 @ 07:40)  RR: 18 (01-09-21 @ 07:40)  SpO2: 95% (01-09-21 @ 07:40)  Wt(kg): --  Height (cm): 172.7 (01-07 @ 18:06)  Weight (kg): 64.8 (01-07 @ 18:06)  BMI (kg/m2): 21.7 (01-07 @ 18:06)  BSA (m2): 1.77 (01-07 @ 18:06)    01-08 @ 07:01 - 01-09 @ 07:00  --------------------------------------------------------  IN: 748 mL / OUT: 590 mL / NET: 158 mL    01-09 @ 07:01  -  01-09 @ 10:49  --------------------------------------------------------  IN: 200 mL / OUT: 200 mL / NET: 0 mL        PHYSICAL EXAM:    Gen: NAD, calm  Cards: RRR, +S1/S2, no M/G/R  Resp: scattered rhonchi  GI: soft, NT/ND, NABS  Vascular: no LE edema B/L    LABS:  01-09    139  |  108  |  40<H>  ----------------------------<  323<H>  4.7   |  20<L>  |  1.54<H>    Ca    8.5      09 Jan 2021 06:55  Phos  3.4     01-09  Mg     2.5     01-09    TPro  5.7<L>  /  Alb  2.3<L>  /  TBili  0.8  /  DBili  0.2  /  AST  79<H>  /  ALT  44  /  AlkPhos  44  01-09    Creatinine Trend: 1.54 <--, 1.52 <--, 1.64 <--                        14.6   12.36 )-----------( 212      ( 09 Jan 2021 06:55 )             43.0     Urine Studies:  Urinalysis Basic - ( 08 Jan 2021 00:32 )    Color:  / Appearance:  / SG:  / pH:   Gluc:  / Ketone:   / Bili:  / Urobili:    Blood:  / Protein:  / Nitrite:    Leuk Esterase:  / RBC: 10-25 /HPF / WBC 0-2 /HPF   Sq Epi:  / Non Sq Epi: Few /HPF / Bacteria: Few /HPF      Sodium, Random Urine: 56 mmol/L (01-07 @ 20:50)  Osmolality, Random Urine: 816 mos/kg (01-07 @ 20:50)  Creatinine, Random Urine: 148 mg/dL (01-07 @ 20:50)  Chloride, Random Urine: 56 mmol/L (01-07 @ 20:50)      RADIOLOGY & ADDITIONAL STUDIES:

## 2021-01-09 NOTE — PROGRESS NOTE ADULT - PROBLEM SELECTOR PLAN 6
Has h/o DM  Started on HSS  Monitor ACHS   f/u HBA1c Has h/o DM   on HSS, lispro and lantus  Monitor ACHS

## 2021-01-09 NOTE — PROGRESS NOTE ADULT - ASSESSMENT
Patient is a 86yo Jordanian speaking Male with DM, HTN, BPH, HLD p/w abd pain and intermittent chest pain x 7 days. Found to be +COVID-19. Nephrology consulted for Elevated serum creatinine.    1. CHINYERE- mild; likely pre-renal due to decreased PO intake. UA with small ketones, 100 protein, large blood RBC 10-25. CK 1350 which should not cause CHINYERE. FeNa 0.45%. Can continue with gentle IVF given poor PO intake and check renal US. Ok to c/w ARB for now, would hold if worsening renal function. Strict I/Os. Avoid nephrotoxins/ NSAIDs/ RCA. Monitor BMP.  2. CKD-3a- Spoke to PMD, Dr. Leonie Ch's office; previous SCr 1.3 on 12/24/20. Defer secondary w/u. Avoid nephrotoxins.   3. BPH- c/w flomax  4.  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 Ok to c/w Losartan for now. Monitor BP  5. COVID-19- Pt on Remdesivir/ steroids. plan as per primary team

## 2021-01-09 NOTE — PROGRESS NOTE ADULT - PROBLEM SELECTOR PLAN 4
presented with elevated cr  Monitor BMP  if cr clearance decreases please hold remdesivir  avoid nephrotoxins presented with elevated cr  Monitor BMP  avoid nephrotoxins

## 2021-01-10 LAB
ALBUMIN SERPL ELPH-MCNC: 2.4 G/DL — LOW (ref 3.5–5)
ALBUMIN SERPL ELPH-MCNC: 2.4 G/DL — LOW (ref 3.5–5)
ALP SERPL-CCNC: 47 U/L — SIGNIFICANT CHANGE UP (ref 40–120)
ALP SERPL-CCNC: 48 U/L — SIGNIFICANT CHANGE UP (ref 40–120)
ALT FLD-CCNC: 50 U/L DA — SIGNIFICANT CHANGE UP (ref 10–60)
ALT FLD-CCNC: 51 U/L DA — SIGNIFICANT CHANGE UP (ref 10–60)
ANION GAP SERPL CALC-SCNC: 12 MMOL/L — SIGNIFICANT CHANGE UP (ref 5–17)
AST SERPL-CCNC: 76 U/L — HIGH (ref 10–40)
AST SERPL-CCNC: 77 U/L — HIGH (ref 10–40)
BILIRUB DIRECT SERPL-MCNC: 0.3 MG/DL — HIGH (ref 0–0.2)
BILIRUB INDIRECT FLD-MCNC: 0.5 MG/DL — SIGNIFICANT CHANGE UP (ref 0.2–1)
BILIRUB SERPL-MCNC: 0.8 MG/DL — SIGNIFICANT CHANGE UP (ref 0.2–1.2)
BILIRUB SERPL-MCNC: 0.8 MG/DL — SIGNIFICANT CHANGE UP (ref 0.2–1.2)
BUN SERPL-MCNC: 43 MG/DL — HIGH (ref 7–18)
CALCIUM SERPL-MCNC: 8.5 MG/DL — SIGNIFICANT CHANGE UP (ref 8.4–10.5)
CHLORIDE SERPL-SCNC: 109 MMOL/L — HIGH (ref 96–108)
CO2 SERPL-SCNC: 19 MMOL/L — LOW (ref 22–31)
CREAT SERPL-MCNC: 1.59 MG/DL — HIGH (ref 0.5–1.3)
D DIMER BLD IA.RAPID-MCNC: 279 NG/ML DDU — HIGH
FERRITIN SERPL-MCNC: 2947 NG/ML — HIGH (ref 30–400)
GLUCOSE BLDC GLUCOMTR-MCNC: 264 MG/DL — HIGH (ref 70–99)
GLUCOSE BLDC GLUCOMTR-MCNC: 347 MG/DL — HIGH (ref 70–99)
GLUCOSE BLDC GLUCOMTR-MCNC: 369 MG/DL — HIGH (ref 70–99)
GLUCOSE BLDC GLUCOMTR-MCNC: 402 MG/DL — HIGH (ref 70–99)
GLUCOSE SERPL-MCNC: 294 MG/DL — HIGH (ref 70–99)
HCT VFR BLD CALC: 42.4 % — SIGNIFICANT CHANGE UP (ref 39–50)
HGB BLD-MCNC: 14.5 G/DL — SIGNIFICANT CHANGE UP (ref 13–17)
LDH SERPL L TO P-CCNC: 498 U/L — HIGH (ref 120–225)
MAGNESIUM SERPL-MCNC: 2.5 MG/DL — SIGNIFICANT CHANGE UP (ref 1.6–2.6)
MCHC RBC-ENTMCNC: 31.5 PG — SIGNIFICANT CHANGE UP (ref 27–34)
MCHC RBC-ENTMCNC: 34.2 GM/DL — SIGNIFICANT CHANGE UP (ref 32–36)
MCV RBC AUTO: 92 FL — SIGNIFICANT CHANGE UP (ref 80–100)
NRBC # BLD: 0 /100 WBCS — SIGNIFICANT CHANGE UP (ref 0–0)
PHOSPHATE SERPL-MCNC: 3.1 MG/DL — SIGNIFICANT CHANGE UP (ref 2.5–4.5)
PLATELET # BLD AUTO: 222 K/UL — SIGNIFICANT CHANGE UP (ref 150–400)
POTASSIUM SERPL-MCNC: 4.4 MMOL/L — SIGNIFICANT CHANGE UP (ref 3.5–5.3)
POTASSIUM SERPL-SCNC: 4.4 MMOL/L — SIGNIFICANT CHANGE UP (ref 3.5–5.3)
PROCALCITONIN SERPL-MCNC: 0.1 NG/ML — SIGNIFICANT CHANGE UP (ref 0.02–0.1)
PROT SERPL-MCNC: 5.9 G/DL — LOW (ref 6–8.3)
PROT SERPL-MCNC: 6 G/DL — SIGNIFICANT CHANGE UP (ref 6–8.3)
RBC # BLD: 4.61 M/UL — SIGNIFICANT CHANGE UP (ref 4.2–5.8)
RBC # FLD: 13.7 % — SIGNIFICANT CHANGE UP (ref 10.3–14.5)
SODIUM SERPL-SCNC: 140 MMOL/L — SIGNIFICANT CHANGE UP (ref 135–145)
WBC # BLD: 14.33 K/UL — HIGH (ref 3.8–10.5)
WBC # FLD AUTO: 14.33 K/UL — HIGH (ref 3.8–10.5)

## 2021-01-10 RX ORDER — SODIUM CHLORIDE 9 MG/ML
1000 INJECTION, SOLUTION INTRAVENOUS
Refills: 0 | Status: DISCONTINUED | OUTPATIENT
Start: 2021-01-10 | End: 2021-01-17

## 2021-01-10 RX ADMIN — LOSARTAN POTASSIUM 25 MILLIGRAM(S): 100 TABLET, FILM COATED ORAL at 06:01

## 2021-01-10 RX ADMIN — Medication 2 UNIT(S): at 12:05

## 2021-01-10 RX ADMIN — PANTOPRAZOLE SODIUM 40 MILLIGRAM(S): 20 TABLET, DELAYED RELEASE ORAL at 06:01

## 2021-01-10 RX ADMIN — SODIUM CHLORIDE 50 MILLILITER(S): 9 INJECTION, SOLUTION INTRAVENOUS at 12:50

## 2021-01-10 RX ADMIN — HEPARIN SODIUM 5000 UNIT(S): 5000 INJECTION INTRAVENOUS; SUBCUTANEOUS at 22:27

## 2021-01-10 RX ADMIN — ATORVASTATIN CALCIUM 40 MILLIGRAM(S): 80 TABLET, FILM COATED ORAL at 22:27

## 2021-01-10 RX ADMIN — INSULIN GLARGINE 10 UNIT(S): 100 INJECTION, SOLUTION SUBCUTANEOUS at 21:03

## 2021-01-10 RX ADMIN — Medication 2 UNIT(S): at 17:11

## 2021-01-10 RX ADMIN — TAMSULOSIN HYDROCHLORIDE 0.4 MILLIGRAM(S): 0.4 CAPSULE ORAL at 22:27

## 2021-01-10 RX ADMIN — Medication 6 MILLIGRAM(S): at 06:00

## 2021-01-10 RX ADMIN — Medication 6: at 12:05

## 2021-01-10 RX ADMIN — Medication 81 MILLIGRAM(S): at 12:06

## 2021-01-10 RX ADMIN — Medication 3: at 08:09

## 2021-01-10 RX ADMIN — Medication 4: at 17:10

## 2021-01-10 RX ADMIN — HEPARIN SODIUM 5000 UNIT(S): 5000 INJECTION INTRAVENOUS; SUBCUTANEOUS at 12:06

## 2021-01-10 RX ADMIN — Medication 2 UNIT(S): at 08:09

## 2021-01-10 RX ADMIN — REMDESIVIR 500 MILLIGRAM(S): 5 INJECTION INTRAVENOUS at 21:03

## 2021-01-10 NOTE — CONSULT NOTE ADULT - SUBJECTIVE AND OBJECTIVE BOX
Time of visit:    CHIEF COMPLAINT: Patient is a 85y old  Male who presents with a chief complaint of chest pain and abdominal pain (07 Jan 2021 15:11)      HPI:  85 year old male with significant medical history of Dm, HLD, HTn, BPH and surgical history of appendectomy presenting to the ED with complaints of abdominal pain and intermittent chest pain for the last 7 days. He had fever 7 days ago, since then he is feeling weak, had two falls, one was 3 days ago and the other was yesterday. He denies LOC, syncope or prodromal symptoms. He says he hit his head at back. Reports that the chest pain is on and off, 5/10, non radiating, feels like pressure and tightness, associated with breathing difficulty. Relates that he has not been able to eat, having loss of appetite, but does feel thirsty. Denies significant weight loss, cough, abdominal pain, nausea, vomiting, headache, visual changes, or any other acute complaints. (07 Jan 2021 15:11)   Patient seen and examined.     PAST MEDICAL & SURGICAL HISTORY:  BPH (benign prostatic hyperplasia)    HLD (hyperlipidemia)    DM (diabetes mellitus)    HTN (hypertension)    History of appendectomy        Allergies    No Known Allergies    Intolerances        MEDICATIONS  (STANDING):  aspirin enteric coated 81 milliGRAM(s) Oral daily  atorvastatin 40 milliGRAM(s) Oral at bedtime  dexAMETHasone  Injectable 6 milliGRAM(s) IV Push daily  heparin   Injectable 5000 Unit(s) SubCutaneous every 8 hours  remdesivir  IVPB 200 milliGRAM(s) IV Intermittent every 24 hours      MEDICATIONS  (PRN):   Medications up to date at time of exam.    Medications up to date at time of exam.    FAMILY HISTORY:      SOCIAL HISTORY  Smoking History: [ x  ] non  smoking/smoke exposure, [   ] former smoker  Living Condition: [   ] apartment, [   ] private house  Work History:   Travel History: denies recent travel  Illicit Substance Use: denies  Alcohol Use: denies    REVIEW OF SYSTEMS: as per EMR    CONSTITUTIONAL:  denies fevers, chills, sweats, weight loss    HEENT:  denies diplopia or blurred vision, sore throat or runny nose.    CARDIOVASCULAR:  denies pressure, squeezing, tightness, or heaviness about the chest; no palpitations.    RESPIRATORY:  denies SOB, cough, LI, wheezing.    GASTROINTESTINAL:  denies abdominal pain, nausea, vomiting or diarrhea.    GENITOURINARY: denies dysuria, frequency or urgency.    NEUROLOGIC:  denies numbness, tingling, seizures or weakness.    PSYCHIATRIC:  denies disorder of thought or mood.    MSK: denies swelling, redness      PHYSICAL EXAMINATION:    GENERAL: The patient is a well-developed, well-nourished, in no apparent distress.     Vital Signs Last 24 Hrs  T(C): 36.9 (07 Jan 2021 10:19), Max: 36.9 (07 Jan 2021 10:19)  T(F): 98.5 (07 Jan 2021 10:19), Max: 98.5 (07 Jan 2021 10:19)  HR: 81 (07 Jan 2021 16:00) (81 - 95)  BP: 131/73 (07 Jan 2021 16:00) (108/68 - 131/73)  BP(mean): --  RR: 18 (07 Jan 2021 16:00) (17 - 18)  SpO2: 96% (07 Jan 2021 16:00) (88% - 96%)   (if applicable)    Chest Tube (if applicable)    HEENT: Head is normocephalic and atraumatic. Extraocular muscles are intact. Mucous membranes are moist.     NECK: Supple, no palpable adenopathy.    LUNGS: Clear to auscultation, no wheezing, rales, or rhonchi.    HEART: Regular rate and rhythm without murmur.    ABDOMEN: Soft, nontender, and nondistended.  No hepatosplenomegaly is noted.    RENAL: No difficulty voiding, no pelvic pain    EXTREMITIES: Without any cyanosis, clubbing, rash, lesions or edema.    NEUROLOGIC: Awake, alert, oriented, grossly intact    SKIN: Warm, dry, good turgor.      LABS:                        15.6   6.62  )-----------( 135      ( 07 Jan 2021 11:34 )             46.8     01-07    138  |  102  |  27<H>  ----------------------------<  224<H>  4.4   |  25  |  1.64<H>    Ca    8.8      07 Jan 2021 11:34    TPro  7.0  /  Alb  2.8<L>  /  TBili  0.8  /  DBili  x   /  AST  103<H>  /  ALT  33  /  AlkPhos  48  01-07    PT/INR - ( 07 Jan 2021 19:11 )   PT: 13.2 sec;   INR: 1.12 ratio               CARDIAC MARKERS ( 07 Jan 2021 11:34 )  0.026 ng/mL / x     / 1350 U/L / x     / x          D-Dimer Assay, Quantitative: 606 ng/mL DDU (01-07-21 @ 11:34)    Serum Pro-Brain Natriuretic Peptide: 231 pg/mL (01-07-21 @ 11:34)    Lactate, Blood: 1.9 mmol/L (01-07-21 @ 11:34)        MICROBIOLOGY: (if applicable)  COVID-19;SARS-CoV-2: Detected:     RADIOLOGY & ADDITIONAL STUDIES:  EKG:   CXR:< from: Xray Chest 1 View- PORTABLE-Urgent (01.07.21 @ 12:45) >    EXAM:  XR CHEST PORTABLE URGENT 1V                            PROCEDURE DATE:  01/07/2021          INTERPRETATION:  AP chest on January 7, 2021 at 12:42 PM. Patient is short of breath with cough and fever.    COMPARISON: None available.    Heart size is within normal limits.    Scattered midlung infiltrates left greater than right are noted consistent with Covid pneumonia. There is a slight left pleural reaction.    IMPRESSION: Bilateral infiltrates left greater than right.            MAX CARMEN M.D., ATTENDING RADIOLOGIST  This document has been electronically signed. Jan 7 2021  1:05PM    < end of copied text >      CT head;< from: CT Head No Cont (01.07.21 @ 12:05) >    EXAM:  CT BRAIN                            PROCEDURE DATE:  01/07/2021          INTERPRETATION:  CT head without IV contrast    CLINICAL INFORMATION: Altered mental status    TECHNIQUE: Contiguous axial 5 mm sections were obtained through the head.Sagittal and coronal 2-D reformatted images were also obtained.   This scan was performed using automatic exposure control (radiation dose reduction software) to obtain a diagnostic image quality scan with patient dose as low as reasonably achievable.    FINDINGS:   No previous examinations are available for review.    The brain demonstrates minimal periventricular white matter ischemia with tiny old lacunar infarction in the LEFT corona radiata.   No acute cerebral cortical infarct is seen.  No intracranial hemorrhage is found.  No mass effect is found in the brain.    The ventricles, sulci and basal cisterns appear unremarkable.    The orbits are unremarkable.  The paranasal sinuses are clear.  The nasal cavity appears intact.  The nasopharynx is symmetric.  The central skull base, petrous temporal bones and calvarium remain intact.      IMPRESSION:   minimal periventricular white matter ischemia with tiny old lacunar infarction in the LEFT corona radiata.              JAYME RUDD MD; Attending Radiologist  This document has been electronically signed. Jan 7 2021  1:57PM    < end of copied text >    ECHO:    IMPRESSION: 85y Male PAST MEDICAL & SURGICAL HISTORY:  BPH (benign prostatic hyperplasia)    HLD (hyperlipidemia)    DM (diabetes mellitus)    HTN (hypertension)    History of appendectomy     p/w         IMP: This is an 85 yr old man , non smoker with HTN, DM uncontrol , BPH  admitted for  hypoxia      ASS   Acute Hypoxic Resp Failure   PNA b/l   COVID-19 infection    DM uncontrol    HTN   CHINYERE    Sugg:  -isolate: contact and air borne   -O2 supp to maintain O2 sat>90%  -continue decadron 6 mg /day x 10 days  -f/u covid-19 antibodies  -Remdesivir pending covid-19 antibody and renal fx  -IVF  -blood sugar control   -dvt/gi prophy  -monitor biomarkers and LFT TIW  
  Patient is a 85y old  Male who presents with a chief complaint of chest pain and abdominal pain (10 Shyam 2021 11:00)      HPI:  85 year old male with significant medical history of Dm, HLD, HTn, BPH and surgical history of appendectomy presenting to the ED with complaints of abdominal pain and intermittent chest pain for the last 7 days. He had fever 7 days ago, since then he is feeling weak, had two falls, one was 3 days ago and the other was yesterday. He denies LOC, syncope or prodromal symptoms. He says he hit his head at back. Reports that the chest pain is on and off, 5/10, non radiating, feels like pressure and tightness, associated with breathing difficulty. Relates that he has not been able to eat, having loss of appetite, but does feel thirsty. Denies significant weight loss, cough, abdominal pain, nausea, vomiting, headache, visual changes, or any other acute complaints.  Endocrinology consulted for glycemic management  receiving dexamethasone at present, uncontrolled hyperglycemia  home regimen includes actos 45mg daily, glipizide 10mg bid, semaglutide 14mg daily, farxiga 10mg daily  on crestor 20mg daily and repatha monthly for HLD      PAST MEDICAL & SURGICAL HISTORY:  BPH (benign prostatic hyperplasia)    HLD (hyperlipidemia)    DM (diabetes mellitus)    HTN (hypertension)    History of appendectomy           MEDICATIONS  (STANDING):  aspirin enteric coated 81 milliGRAM(s) Oral daily  atorvastatin 40 milliGRAM(s) Oral at bedtime  dexAMETHasone  Injectable 6 milliGRAM(s) IV Push daily  heparin   Injectable 5000 Unit(s) SubCutaneous every 8 hours  insulin glargine Injectable (LANTUS) 10 Unit(s) SubCutaneous at bedtime  insulin lispro (ADMELOG) corrective regimen sliding scale   SubCutaneous three times a day before meals  insulin lispro Injectable (ADMELOG) 2 Unit(s) SubCutaneous three times a day before meals  losartan 25 milliGRAM(s) Oral daily  pantoprazole    Tablet 40 milliGRAM(s) Oral before breakfast  remdesivir  IVPB 100 milliGRAM(s) IV Intermittent every 24 hours  sodium chloride 0.45%. 1000 milliLiter(s) (50 mL/Hr) IV Continuous <Continuous>  tamsulosin 0.4 milliGRAM(s) Oral at bedtime    MEDICATIONS  (PRN):  acetaminophen   Tablet .. 650 milliGRAM(s) Oral every 6 hours PRN Temp greater or equal to 38C (100.4F), Moderate Pain (4 - 6)      FAMILY HISTORY:      SOCIAL HISTORY:      REVIEW OF SYSTEMS:  CONSTITUTIONAL:  fatigue  EYES: No eye pain  ENT:  No difficulty hearing, tinnitus  NECK: No pain or stiffness  RESPIRATORY: No Shortness of Breath  CARDIOVASCULAR: chest pain, palpitations  GASTROINTESTINAL: No abdominal or epigastric pain.  GENITOURINARY: No dysuria, frequency  NEUROLOGICAL:  headachestremors  SKIN: No itching, burning, rashes, or lesions   LYMPH Nodes: No enlarged glands  ENDOCRINE: No heat or cold intolerance; No hair loss  MUSCULOSKELETAL: No joint pain or swelling  PSYCHIATRIC: No depression, anxiety  HEME/LYMPH: No easy bruising, or bleeding gums  ALLERGY AND IMMUNOLOGIC: No hives or eczema	        Vital Signs Last 24 Hrs  T(C): 37 (10 Shyam 2021 11:17), Max: 37 (10 Shyam 2021 11:17)  T(F): 98.6 (10 Shyam 2021 11:17), Max: 98.6 (10 Shyam 2021 11:17)  HR: 92 (10 Shyam 2021 11:17) (78 - 102)  BP: 100/60 (10 Shyam 2021 11:17) (100/60 - 129/76)  BP(mean): --  RR: 18 (10 Shyam 2021 11:17) (18 - 18)  SpO2: 95% (10 Shyam 2021 11:17) (92% - 99%)      Constitutional:    NC/AT:    HEENT:    Neck:  No JVD  Respiratory:  reduced breath sounds b/l bases    Cardiovascular:  RR     Gastrointestinal: Soft     Extremities: without cyanosis    Neurological:  non focal        LABS:                        14.5   14.33 )-----------( 222      ( 10 Shyam 2021 06:37 )             42.4     01-10    140  |  109<H>  |  43<H>  ----------------------------<  294<H>  4.4   |  19<L>  |  1.59<H>    Ca    8.5      10 Shyam 2021 06:37  Phos  3.1     01-10  Mg     2.5     01-10    TPro  5.9<L>  /  Alb  2.4<L>  /  TBili  0.8  /  DBili  0.3<H>  /  AST  76<H>  /  ALT  51  /  AlkPhos  48  01-10            CAPILLARY BLOOD GLUCOSE      POCT Blood Glucose.: 402 mg/dL (10 Shyam 2021 11:30)  POCT Blood Glucose.: 264 mg/dL (10 Shyam 2021 08:05)  POCT Blood Glucose.: 271 mg/dL (09 Jan 2021 21:10)  POCT Blood Glucose.: 272 mg/dL (09 Jan 2021 16:36)      RADIOLOGY & ADDITIONAL STUDIES:    
[facility] NEPHROLOGY- CONSULTATION NOTE    [place]  # [id number]    Patient is a 84yo Cambodian speaking Male with DM, HTN, BPH, HLD p/w abd pain and intermittent chest pain x 7 days. Found to be +COVID-19. Nephrology consulted for Elevated serum creatinine.    Patient denies any h/o kidney disease. Patient denies any NSAID use, recent CT with contrast, hepatitis or blood transfusions. Pt denies any urinary complaints.   Pt states he was having fever, fatigue and extreme weakness unable to get out of bed for 2-3 days. Pt c/o mild SOB. Denies any cough, current chest pain, n/v/d or abd pain.     PAST MEDICAL & SURGICAL HISTORY:  BPH (benign prostatic hyperplasia)    HLD (hyperlipidemia)    DM (diabetes mellitus)    HTN (hypertension)    History of appendectomy      No Known Allergies    Home Medications Reviewed  Hospital Medications:   MEDICATIONS  (STANDING):  aspirin enteric coated 81 milliGRAM(s) Oral daily  atorvastatin 40 milliGRAM(s) Oral at bedtime  dexAMETHasone  Injectable 6 milliGRAM(s) IV Push daily  heparin   Injectable 5000 Unit(s) SubCutaneous every 8 hours  insulin glargine Injectable (LANTUS) 10 Unit(s) SubCutaneous at bedtime  insulin lispro (ADMELOG) corrective regimen sliding scale   SubCutaneous three times a day before meals  losartan 25 milliGRAM(s) Oral daily  pantoprazole    Tablet 40 milliGRAM(s) Oral before breakfast  remdesivir  IVPB 100 milliGRAM(s) IV Intermittent every 24 hours  tamsulosin 0.4 milliGRAM(s) Oral at bedtime    REVIEW OF SYSTEMS:  Gen: no changes in weight +fatigue  HEENT: no rhinorrhea  Neck: no sore throat  Cards: no chest pain  Resp: +mild dyspnea  GI: no nausea or vomiting or diarrhea  : no dysuria or hematuria  Vascular: no LE edema  Derm: no rashes  Neuro: no numbness/tingling  All other review of systems is negative unless indicated above.    VITALS:  T(F): 98.2 (01-08-21 @ 11:21), Max: 98.2 (01-08-21 @ 11:21)  HR: 76 (01-08-21 @ 11:21)  BP: 113/71 (01-08-21 @ 11:21)  RR: 18 (01-08-21 @ 11:21)  SpO2: 90% (01-08-21 @ 11:21)  Wt(kg): --    01-07 @ 07:01  -  01-08 @ 07:00  --------------------------------------------------------  IN: 275 mL / OUT: 600 mL / NET: -325 mL      Height (cm): 172.7 (01-07 @ 18:06)  Weight (kg): 64.8 (01-07 @ 18:06)  BMI (kg/m2): 21.7 (01-07 @ 18:06)  BSA (m2): 1.77 (01-07 @ 18:06)    PHYSICAL EXAM:  Gen: NAD,   HEENT: MMM  Neck: no JVD  Cards: RRR, +S1/S2, no M/G/R  Resp: +scattered rhonchi  GI: soft, NT/ND, NABS  : no CVA tenderness  Extremities: no LE edema B/L  Derm: no rashes  Neuro: non-focal    LABS:  01-08    143  |  108  |  29<H>  ----------------------------<  327<H>  4.4   |  21<L>  |  1.52<H>    Ca    8.7      08 Jan 2021 06:47  Phos  3.7     01-08  Mg     2.4     01-08    TPro  6.5  /  Alb  2.5<L>  /  TBili  0.8  /  DBili      /  AST  90<H>  /  ALT  35  /  AlkPhos  47  01-08    Creatinine Trend: 1.52 <--, 1.64 <--                        15.6   6.52  )-----------( 169      ( 08 Jan 2021 06:47 )             47.2     Urine Studies:  Urinalysis Basic - ( 08 Jan 2021 00:32 )    Color:  / Appearance:  / SG:  / pH:   Gluc:  / Ketone:   / Bili:  / Urobili:    Blood:  / Protein:  / Nitrite:    Leuk Esterase:  / RBC: 10-25 /HPF / WBC 0-2 /HPF   Sq Epi:  / Non Sq Epi: Few /HPF / Bacteria: Few /HPF      Sodium, Random Urine: 56 mmol/L (01-07 @ 20:50)  Osmolality, Random Urine: 816 mos/kg (01-07 @ 20:50)  Creatinine, Random Urine: 148 mg/dL (01-07 @ 20:50)  Chloride, Random Urine: 56 mmol/L (01-07 @ 20:50)    RADIOLOGY & ADDITIONAL STUDIES:        < from: Xray Chest 1 View- PORTABLE-Urgent (01.07.21 @ 12:45) >    EXAM:  XR CHEST PORTABLE URGENT 1V                            PROCEDURE DATE:  01/07/2021          INTERPRETATION:  AP chest on January 7, 2021 at 12:42 PM. Patient is short of breath with cough and fever.    COMPARISON: None available.    Heart size is within normal limits.    Scattered midlung infiltrates left greater than right are noted consistent with Covid pneumonia. There is a slight left pleural reaction.    IMPRESSION: Bilateral infiltrates left greater than right.            MAX CARMEN M.D., ATTENDING RADIOLOGIST  This document has been electronically signed. Jan 7 2021  1:05PM    < end of copied text >

## 2021-01-10 NOTE — CONSULT NOTE ADULT - ASSESSMENT
86 yo male with multiple comorbidities including h/o DM type 2, HTN, HLD admitted with fatigue, chest pain    endocrinology consulted for glycemic management    DM type 2  uncontrolled  HBA1C 7.7  complicated by uncontrolled hyperglycemia, high dose steroid use, acute COVID 19  home regimen:  actos 45mg daily, glipizide 10mg bid, farxiga, semaglutide 14mg daily    recommendations:  high dose steroids- hyperglycemia  - additional insulin lantus 10 units x 1 now  - increase insulin lantus to 20 units starting tonight 1/10  - increase insulin lispro to 6 units pre meals  - continue current sliding scale  - reassess based on requirements  - goal inpatient glucose range: 140-180mg/dl    CHINYERE  cautious insulin dosing d/t reduced renal insulin clearance    COVID 19  on dexamethasone  remdseivir  hyperglycemic- adjust insulin regimen    HLD  LDL at goal  on repatha, crestor 20 as outpatient  continue atorvastatin 40mg daily while inpatient      Discussed with patient and primary team  Please call Endocrine- [phone]- Dr Katarina Sandoval as needed       84 yo male with multiple comorbidities including h/o DM type 2, HTN, HLD admitted with fatigue, chest pain    endocrinology consulted for glycemic management    DM type 2  uncontrolled  HBA1C 7.7  complicated by uncontrolled hyperglycemia, high dose steroid use, acute COVID 19  home regimen:  actos 45mg daily, glipizide 10mg bid, farxiga, semaglutide 14mg daily  follows with Dr Hadley outpatient    recommendations:  high dose steroids- hyperglycemia  - additional insulin lantus 10 units x 1 now  - increase insulin lantus to 20 units starting tonight 1/10  - increase insulin lispro to 6 units pre meals  - continue current sliding scale  - reassess based on requirements  - goal inpatient glucose range: 140-180mg/dl    tentative discharge regimen:  discussed with patient- may need insulin outpatient depending on doses he requires  he is agreeable to it    CHINYERE  cautious insulin dosing d/t reduced renal insulin clearance    COVID 19  on dexamethasone  remdseivir  hyperglycemic- adjust insulin regimen    HLD  LDL at goal  on repatha, crestor 20 as outpatient  continue atorvastatin 40mg daily while inpatient      Discussed with patient and primary team  Please call Endocrine- [phone]- Dr Katarina Sandoval as needed

## 2021-01-10 NOTE — PROGRESS NOTE ADULT - ASSESSMENT
85 year old male with significant medical history of Dm, HLD, HTn and surgical history of appendectomy presenting to the ED with complaints of abdominal pain and intermittent chest pain for the last 7 days.

## 2021-01-10 NOTE — PROGRESS NOTE ADULT - PROBLEM SELECTOR PROBLEM 9
PROCEDURAL PRE-SEDATION ASSESSMENT     Planned procedure:ERCP    HPI:  This is a 61 year old male with biliary pancreatitis    CURRENT MEDICATIONS:  Home Medications    Medication Directions Start Date End Date   Ascorbic Acid (VITAMIN C) 500 MG chewable tablet Chew 500 mg by mouth nightly.     pantoprazole (PROTONIX) 40 MG tablet Take 1 tablet by mouth daily. 4/22/19    ibuprofen (MOTRIN) 200 MG tablet Take 400 mg by mouth every 6 hours as needed for Pain.     cholecalciferol (VITAMIN D3) 1000 UNITS tablet Take 1,000 Units by mouth daily.     Multiple Vitamins-Minerals (MULTI ADULT GUMMIES) Chew Tab Chew 1 tablet by mouth 2 times daily.     mometasone (ELOCON) 0.1 % ointment Apply 1 application topically 2 times daily. APPLY TOPICALLY twice daily TO RED RAISED SCALY AREAS OF TRUNK FOR 2 WEEKS     triamcinolone (ARISTOCORT) 0.1 % cream Apply topically 2 times daily. To red raised areas of scalp for 2 weeks.     INVOKANA 300 MG tablet TAKE 1 TABLET BY MOUTH DAILY(BEFORE BREAKFAST) 3/25/19    metoPROLOL tartrate (LOPRESSOR) 100 MG tablet TAKE 1 TABLET BY MOUTH EVERY DAY 3/25/19    lisinopril (ZESTRIL) 10 MG tablet TAKE 1 TABLET BY MOUTH EVERY DAY 3/25/19    JANUVIA 100 MG tablet TAKE 1 TABLET BY MOUTH EVERY DAY 3/25/19    amlodipine-atorvastatin (CADUET) 10-80 MG per tablet TAKE 1 TABLET BY MOUTH EVERY DAY 3/25/19    pioglitazone (ACTOS) 45 MG tablet TAKE 1 TABLET BY MOUTH DAILY 3/25/19    fenofibrate (TRICOR) 145 MG tablet TAKE 1 TABLET BY MOUTH EVERY DAY 3/25/19    glipiZIDE (GLUCOTROL) 10 MG tablet TAKE 1 TABLET BY MOUTH TWICE DAILY 3/25/19    betamethasone dipropionate augmented (DIPROLENE AF) 0.05 % cream Apply 1 application topically 2 times daily. 9/24/18    clobetasol (TEMOVATE) 0.05 % cream Apply topically to affected area twice daily x 2 weeks, then as needed. 9/24/18    glipiZIDE (GLUCOTROL) 5 MG tablet TAKE ONE TABLET BY MOUTH TWICE DAILY BEFORE MEALS ALONG WITH 10MG TABLET FOR TOTAL DOSE OF 15MG TWICE 
DAILY 6/22/18    sildenafil (VIAGRA) 100 MG tablet TAKE 1 TABLET BY MOUTH AS NEEDED FOR ERECTILE DYSFUNCTION 3/23/18    Probiotic Product (ACIDOPHILUS HIGH-POTENCY) Cap Take by mouth daily.      loratadine (CLARITIN) 10 MG tablet Take 10 mg by mouth daily.     Ascorbic Acid (VITAMIN C) 1000 MG tablet Take 1,000 mg by mouth 2 times daily. Indications: 1000 mg in am  and 500mg at night      aspirin (ASPIRIN) 81 MG EC tablet Take 81 mg by mouth daily.         SOCIAL HISTORY  Social History     Socioeconomic History   • Marital status: /Civil Union     Spouse name: Not on file   • Number of children: Not on file   • Years of education: Not on file   • Highest education level: Not on file   Occupational History   • Occupation:      Employer: [employer]   Social Needs   • Financial resource strain: Not on file   • Food insecurity:     Worry: Not on file     Inability: Not on file   • Transportation needs:     Medical: Not on file     Non-medical: Not on file   Tobacco Use   • Smoking status: Never Smoker   • Smokeless tobacco: Never Used   Substance and Sexual Activity   • Alcohol use: No     Alcohol/week: 0.0 oz     Frequency: Never     Binge frequency: Never   • Drug use: No   • Sexual activity: Yes     Partners: Female     Birth control/protection: Post-menopausal   Lifestyle   • Physical activity:     Days per week: Not on file     Minutes per session: Not on file   • Stress: Not on file   Relationships   • Social connections:     Talks on phone: Not on file     Gets together: Not on file     Attends [employer] service: Not on file     Active member of club or organization: Not on file     Attends meetings of clubs or organizations: Not on file     Relationship status: Not on file   • Intimate partner violence:     Fear of current or ex partner: Not on file     Emotionally abused: Not on file     Physically abused: Not on file     Forced sexual activity: Not on file   Other Topics Concern   •  
Goals of care, counseling/discussion
Service Not Asked   • Blood Transfusions Not Asked   • Caffeine Concern Not Asked   • Occupational Exposure Not Asked   • Hobby Hazards Not Asked   • Sleep Concern Not Asked   • Stress Concern Not Asked   • Weight Concern Not Asked   • Special Diet Not Asked   • Back Care Not Asked   • Exercise Not Asked   • Bike Helmet Not Asked   • Seat Belt Yes   • Self-Exams Not Asked   Social History Narrative   • Not on file       ALLERGIES  Allergies as of 05/07/2019   • (No Known Allergies)       PAST MEDICAL HISTORY  Past Medical History:   Diagnosis Date   • CAD (coronary artery disease)    • Impotence    • Obesity, unspecified    • Other and unspecified hyperlipidemia    • Type II or unspecified type diabetes mellitus without mention of complication, not stated as uncontrolled    • Unspecified essential hypertension        PAST SURGICAL HISTORY  Past Surgical History:   Procedure Laterality Date   • Cholecystectomy  11-21-14    lap converted to open rachel   • Colonoscopy  ~2000    ? [facility]   • Colonoscopy  12/28/2017    repeat due    • Colonoscopy diagnostic  07/31/2014    due for repeat colon 8/2017   • Insert intracoronary stent  05/06/2010    PTCA, Single Stent   • Removal gallbladder N/A 11/2015   • [place] tooth extraction         FAMILY HISTORY  Family History   Problem Relation Age of Onset   • Heart disease Mother    • High blood pressure Mother    • Stroke Mother    • Diabetes Father    • Heart disease Father    • High blood pressure Father    • Diabetes Brother    • Heart disease Brother    • High blood pressure Brother    • Stroke Brother    • Diabetes Paternal Grandmother    • Heart disease Paternal Grandfather    • NEGATIVE FAMILY HX OF Other         Colon Cancer       MEDICAL HISTORY   Cardiac history:  No  Respiratory history: No  Smoking history: No  Alcohol/drug abuse: NO    Anesthesia history: Reviewed  Family anesthesia history: Reviewed  Possible pregnancy (LMP): NO  Airway risk history 
(sleep apnea, stridor, snoring, neck arthritis):NO  Previous complications: None    PHYSICAL EXAM   Heart: NORMAL findings  Lungs: NORMAL findings  Neuro: NORMAL findings  Vascular: NORMAL findings    OTHER FINDINGS  Reviewed current medications and allergies: YES  Reviewed pertinent lab/diagnostic tests: YES      RISK STATUS: ASA 2 Normal patient with mild systemic disease    AIRWAY ASSESSMENT: Mallampati Class II - Soft palate uvula fauces pillars visible.  No difficulty.    PLAN FOR SEDATION: General    EKG Monitoring: YES    Risks benefits and alternatives to this procedure were explained including but not limited to bleeding, infection, perforation.      REASSESSMENT IMMEDIATELY PRIOR TO PROCEDURE:   Patient remains a candidate for the planned sedation and procedure.    Proceed with ercp  Assessing Physician: Yoshi Parra MD                         Time: 10:09 AM    
Goals of care, counseling/discussion
Goals of care, counseling/discussion

## 2021-01-10 NOTE — PROGRESS NOTE ADULT - ASSESSMENT
Patient is a 86yo Palauan speaking Male with DM, HTN, BPH, HLD p/w abd pain and intermittent chest pain x 7 days. Found to be +COVID-19. Nephrology consulted for Elevated serum creatinine.    1. CHINYERE- mild; likely pre-renal due to decreased PO intake. UA with small ketones, 100 protein, large blood RBC 10-25. CK 1350 which should not cause CHINYERE. FeNa 0.45%. Can continue with gentle IVF given poor PO intake and follow up renal US. Ok to c/w ARB for now, would hold if worsening renal function. Strict I/Os. Avoid nephrotoxins/ NSAIDs/ RCA. Monitor BMP.  2. CKD-3a- Spoke to PMD, Dr. Leonie Ch's office; previous SCr 1.3 on 12/24/20. Defer secondary w/u. Avoid nephrotoxins.   3. BPH- c/w flomax  4.  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 Ok to c/w Losartan for now. Monitor BP  5. COVID-19- Pt on Remdesivir/ steroids. plan as per primary team

## 2021-01-10 NOTE — PROGRESS NOTE ADULT - SUBJECTIVE AND OBJECTIVE BOX
SUBJECTIVE / OVERNIGHT EVENTS: pt seen and examined    MEDICATIONS  (STANDING):  aspirin enteric coated 81 milliGRAM(s) Oral daily  atorvastatin 40 milliGRAM(s) Oral at bedtime  dexAMETHasone  Injectable 6 milliGRAM(s) IV Push daily  heparin   Injectable 5000 Unit(s) SubCutaneous every 8 hours  insulin glargine Injectable (LANTUS) 10 Unit(s) SubCutaneous at bedtime  insulin lispro (ADMELOG) corrective regimen sliding scale   SubCutaneous three times a day before meals  insulin lispro Injectable (ADMELOG) 2 Unit(s) SubCutaneous three times a day before meals  losartan 25 milliGRAM(s) Oral daily  pantoprazole    Tablet 40 milliGRAM(s) Oral before breakfast  remdesivir  IVPB 100 milliGRAM(s) IV Intermittent every 24 hours  sodium chloride 0.45%. 1000 milliLiter(s) (50 mL/Hr) IV Continuous <Continuous>  tamsulosin 0.4 milliGRAM(s) Oral at bedtime    MEDICATIONS  (PRN):  acetaminophen   Tablet .. 650 milliGRAM(s) Oral every 6 hours PRN Temp greater or equal to 38C (100.4F), Moderate Pain (4 - 6)    Vital Signs Last 24 Hrs  T(C): 36.2 (10 Shyam 2021 19:32), Max: 37 (10 Shyam 2021 11:17)  T(F): 97.2 (10 Shyam 2021 19:32), Max: 98.6 (10 Shyam 2021 11:17)  HR: 95 (10 Shyam 2021 19:32) (78 - 95)  BP: 125/77 (10 Shyam 2021 19:32) (100/60 - 129/76)  BP(mean): --  RR: 18 (10 Shyam 2021 19:32) (18 - 18)  SpO2: 96% (10 Shyam 2021 19:32) (92% - 99%)    Constitutional: No fever, fatigue  Skin: No rash.  Eyes: No recent vision problems or eye pain.  ENT: No congestion, ear pain, or sore throat.  Cardiovascular: No chest pain or palpation.  Respiratory: No cough, shortness of breath, congestion, or wheezing.  Gastrointestinal: No abdominal pain, nausea, vomiting, or diarrhea.  Genitourinary: No dysuria.  Musculoskeletal: No joint swelling.  Neurologic: No headache.    PHYSICAL EXAM:  GENERAL: NAD  EYES: EOMI, PERRLA  NECK: Supple, No JVD  CHEST/LUNG: dec breath sounds at bases  HEART:  S1 , S2 +  ABDOMEN: soft , bs+  EXTREMITIES:  trace edema  NEUROLOGY:alert awake confused       LABS:                        14.5   14.33 )-----------( 222      ( 10 Shyam 2021 06:37 )             42.4     01-10    140  |  109<H>  |  43<H>  ----------------------------<  294<H>  4.4   |  19<L>  |  1.59<H>    Ca    8.5      10 Shyam 2021 06:37  Phos  3.1     01-10  Mg     2.5     01-10    TPro  5.9<L>  /  Alb  2.4<L>  /  TBili  0.8  /  DBili  0.3<H>  /  AST  76<H>  /  ALT  51  /  AlkPhos  48  01-10              RADIOLOGY & ADDITIONAL TESTS:    Imaging Personally Reviewed:    Consultant(s) Notes Reviewed:      Care Discussed with Consultants/Other Providers:

## 2021-01-10 NOTE — PROGRESS NOTE ADULT - SUBJECTIVE AND OBJECTIVE BOX
[facility] NEPHROLOGY- PROGRESS NOTE    Patient is a 86yo [place]egalese speaking Male with DM, HTN, BPH, HLD p/w abd pain and intermittent chest pain x 7 days. Found to be +COVID-19. Nephrology consulted for Elevated serum creatinine.co    REVIEW OF SYSTEMS:  Gen: no changes in weight  Cards: no chest pain  Resp: + dyspnea improving  GI: no nausea or vomiting or diarrhea  Vascular: no LE edema    No Known Allergies      Hospital Medications: Medications reviewed      VITALS:  T(F): 98 (01-10-21 @ 08:28), Max: 98 (01-09-21 @ 11:18)  HR: 80 (01-10-21 @ 08:28)  BP: 121/74 (01-10-21 @ 08:28)  RR: 18 (01-10-21 @ 08:28)  SpO2: 98% (01-10-21 @ 08:28)  Wt(kg): --    01-09 @ 07:01  -  01-10 @ 07:00  --------------------------------------------------------  IN: 900 mL / OUT: 890 mL / NET: 10 mL    01-10 @ 07:01  -  01-10 @ 11:00  --------------------------------------------------------  IN: 200 mL / OUT: 0 mL / NET: 200 mL        PHYSICAL EXAM:    Gen: NAD, calm, on NC  Cards: RRR, +S1/S2, no M/G/R  Resp: scattered rhonchi  GI: soft, NT/ND, NABS  Vascular: no LE edema B/L      LABS:  01-10    140  |  109<H>  |  43<H>  ----------------------------<  294<H>  4.4   |  19<L>  |  1.59<H>    Ca    8.5      10 Shyam 2021 06:37  Phos  3.1     01-10  Mg     2.5     01-10    TPro  5.9<L>  /  Alb  2.4<L>  /  TBili  0.8  /  DBili  0.3<H>  /  AST  76<H>  /  ALT  51  /  AlkPhos  48  01-10    Creatinine Trend: 1.59 <--, 1.54 <--, 1.52 <--, 1.64 <--                        14.5   14.33 )-----------( 222      ( 10 Shyam 2021 06:37 )             42.4     Urine Studies:  Urinalysis Basic - ( 08 Jan 2021 00:32 )    Color:  / Appearance:  / SG:  / pH:   Gluc:  / Ketone:   / Bili:  / Urobili:    Blood:  / Protein:  / Nitrite:    Leuk Esterase:  / RBC: 10-25 /HPF / WBC 0-2 /HPF   Sq Epi:  / Non Sq Epi: Few /HPF / Bacteria: Few /HPF      Sodium, Random Urine: 56 mmol/L (01-07 @ 20:50)  Osmolality, Random Urine: 816 mos/kg (01-07 @ 20:50)  Creatinine, Random Urine: 148 mg/dL (01-07 @ 20:50)  Chloride, Random Urine: 56 mmol/L (01-07 @ 20:50)

## 2021-01-11 LAB
ALBUMIN SERPL ELPH-MCNC: 2.5 G/DL — LOW (ref 3.5–5)
ALBUMIN SERPL ELPH-MCNC: 2.6 G/DL — LOW (ref 3.5–5)
ALP SERPL-CCNC: 50 U/L — SIGNIFICANT CHANGE UP (ref 40–120)
ALP SERPL-CCNC: 52 U/L — SIGNIFICANT CHANGE UP (ref 40–120)
ALT FLD-CCNC: 71 U/L DA — HIGH (ref 10–60)
ALT FLD-CCNC: 71 U/L DA — HIGH (ref 10–60)
ANION GAP SERPL CALC-SCNC: 11 MMOL/L — SIGNIFICANT CHANGE UP (ref 5–17)
AST SERPL-CCNC: 90 U/L — HIGH (ref 10–40)
AST SERPL-CCNC: 90 U/L — HIGH (ref 10–40)
BILIRUB DIRECT SERPL-MCNC: 0.2 MG/DL — SIGNIFICANT CHANGE UP (ref 0–0.2)
BILIRUB INDIRECT FLD-MCNC: 0.7 MG/DL — SIGNIFICANT CHANGE UP (ref 0.2–1)
BILIRUB SERPL-MCNC: 0.9 MG/DL — SIGNIFICANT CHANGE UP (ref 0.2–1.2)
BILIRUB SERPL-MCNC: 0.9 MG/DL — SIGNIFICANT CHANGE UP (ref 0.2–1.2)
BUN SERPL-MCNC: 39 MG/DL — HIGH (ref 7–18)
CALCIUM SERPL-MCNC: 8.6 MG/DL — SIGNIFICANT CHANGE UP (ref 8.4–10.5)
CHLORIDE SERPL-SCNC: 108 MMOL/L — SIGNIFICANT CHANGE UP (ref 96–108)
CO2 SERPL-SCNC: 21 MMOL/L — LOW (ref 22–31)
CREAT SERPL-MCNC: 1.57 MG/DL — HIGH (ref 0.5–1.3)
GLUCOSE BLDC GLUCOMTR-MCNC: 314 MG/DL — HIGH (ref 70–99)
GLUCOSE BLDC GLUCOMTR-MCNC: 324 MG/DL — HIGH (ref 70–99)
GLUCOSE BLDC GLUCOMTR-MCNC: 358 MG/DL — HIGH (ref 70–99)
GLUCOSE BLDC GLUCOMTR-MCNC: 410 MG/DL — HIGH (ref 70–99)
GLUCOSE SERPL-MCNC: 312 MG/DL — HIGH (ref 70–99)
HCT VFR BLD CALC: 43.1 % — SIGNIFICANT CHANGE UP (ref 39–50)
HGB BLD-MCNC: 14.8 G/DL — SIGNIFICANT CHANGE UP (ref 13–17)
MAGNESIUM SERPL-MCNC: 2.5 MG/DL — SIGNIFICANT CHANGE UP (ref 1.6–2.6)
MCHC RBC-ENTMCNC: 31.4 PG — SIGNIFICANT CHANGE UP (ref 27–34)
MCHC RBC-ENTMCNC: 34.3 GM/DL — SIGNIFICANT CHANGE UP (ref 32–36)
MCV RBC AUTO: 91.5 FL — SIGNIFICANT CHANGE UP (ref 80–100)
NRBC # BLD: 0 /100 WBCS — SIGNIFICANT CHANGE UP (ref 0–0)
PHOSPHATE SERPL-MCNC: 3.2 MG/DL — SIGNIFICANT CHANGE UP (ref 2.5–4.5)
PLATELET # BLD AUTO: 233 K/UL — SIGNIFICANT CHANGE UP (ref 150–400)
POTASSIUM SERPL-MCNC: 4.3 MMOL/L — SIGNIFICANT CHANGE UP (ref 3.5–5.3)
POTASSIUM SERPL-SCNC: 4.3 MMOL/L — SIGNIFICANT CHANGE UP (ref 3.5–5.3)
PROT SERPL-MCNC: 6 G/DL — SIGNIFICANT CHANGE UP (ref 6–8.3)
PROT SERPL-MCNC: 6.1 G/DL — SIGNIFICANT CHANGE UP (ref 6–8.3)
RBC # BLD: 4.71 M/UL — SIGNIFICANT CHANGE UP (ref 4.2–5.8)
RBC # FLD: 13.5 % — SIGNIFICANT CHANGE UP (ref 10.3–14.5)
SODIUM SERPL-SCNC: 140 MMOL/L — SIGNIFICANT CHANGE UP (ref 135–145)
WBC # BLD: 12.64 K/UL — HIGH (ref 3.8–10.5)
WBC # FLD AUTO: 12.64 K/UL — HIGH (ref 3.8–10.5)

## 2021-01-11 RX ORDER — INSULIN LISPRO 100/ML
6 VIAL (ML) SUBCUTANEOUS
Refills: 0 | Status: DISCONTINUED | OUTPATIENT
Start: 2021-01-11 | End: 2021-01-13

## 2021-01-11 RX ORDER — INSULIN GLARGINE 100 [IU]/ML
10 INJECTION, SOLUTION SUBCUTANEOUS ONCE
Refills: 0 | Status: COMPLETED | OUTPATIENT
Start: 2021-01-11 | End: 2021-01-11

## 2021-01-11 RX ORDER — INSULIN GLARGINE 100 [IU]/ML
10 INJECTION, SOLUTION SUBCUTANEOUS
Refills: 0 | Status: DISCONTINUED | OUTPATIENT
Start: 2021-01-11 | End: 2021-01-11

## 2021-01-11 RX ORDER — INSULIN GLARGINE 100 [IU]/ML
20 INJECTION, SOLUTION SUBCUTANEOUS AT BEDTIME
Refills: 0 | Status: DISCONTINUED | OUTPATIENT
Start: 2021-01-11 | End: 2021-01-13

## 2021-01-11 RX ORDER — INSULIN LISPRO 100/ML
5 VIAL (ML) SUBCUTANEOUS
Refills: 0 | Status: DISCONTINUED | OUTPATIENT
Start: 2021-01-11 | End: 2021-01-11

## 2021-01-11 RX ORDER — INSULIN GLARGINE 100 [IU]/ML
14 INJECTION, SOLUTION SUBCUTANEOUS AT BEDTIME
Refills: 0 | Status: DISCONTINUED | OUTPATIENT
Start: 2021-01-11 | End: 2021-01-11

## 2021-01-11 RX ADMIN — TAMSULOSIN HYDROCHLORIDE 0.4 MILLIGRAM(S): 0.4 CAPSULE ORAL at 21:25

## 2021-01-11 RX ADMIN — HEPARIN SODIUM 5000 UNIT(S): 5000 INJECTION INTRAVENOUS; SUBCUTANEOUS at 12:37

## 2021-01-11 RX ADMIN — HEPARIN SODIUM 5000 UNIT(S): 5000 INJECTION INTRAVENOUS; SUBCUTANEOUS at 21:26

## 2021-01-11 RX ADMIN — INSULIN GLARGINE 20 UNIT(S): 100 INJECTION, SOLUTION SUBCUTANEOUS at 22:24

## 2021-01-11 RX ADMIN — PANTOPRAZOLE SODIUM 40 MILLIGRAM(S): 20 TABLET, DELAYED RELEASE ORAL at 05:23

## 2021-01-11 RX ADMIN — Medication 650 MILLIGRAM(S): at 21:27

## 2021-01-11 RX ADMIN — Medication 5: at 12:02

## 2021-01-11 RX ADMIN — Medication 81 MILLIGRAM(S): at 12:37

## 2021-01-11 RX ADMIN — INSULIN GLARGINE 10 UNIT(S): 100 INJECTION, SOLUTION SUBCUTANEOUS at 17:19

## 2021-01-11 RX ADMIN — Medication 5 UNIT(S): at 12:03

## 2021-01-11 RX ADMIN — Medication 6 MILLIGRAM(S): at 05:23

## 2021-01-11 RX ADMIN — ATORVASTATIN CALCIUM 40 MILLIGRAM(S): 80 TABLET, FILM COATED ORAL at 21:25

## 2021-01-11 RX ADMIN — HEPARIN SODIUM 5000 UNIT(S): 5000 INJECTION INTRAVENOUS; SUBCUTANEOUS at 05:23

## 2021-01-11 RX ADMIN — Medication 6 UNIT(S): at 17:20

## 2021-01-11 RX ADMIN — Medication 6: at 17:20

## 2021-01-11 RX ADMIN — REMDESIVIR 500 MILLIGRAM(S): 5 INJECTION INTRAVENOUS at 21:27

## 2021-01-11 RX ADMIN — Medication 4: at 07:56

## 2021-01-11 RX ADMIN — LOSARTAN POTASSIUM 25 MILLIGRAM(S): 100 TABLET, FILM COATED ORAL at 05:23

## 2021-01-11 RX ADMIN — Medication 2 UNIT(S): at 07:56

## 2021-01-11 NOTE — PROGRESS NOTE ADULT - PROBLEM SELECTOR PLAN 1
Presented with chest pain, atypical  COVID positive  CXr shows b/l infiltrates  ED admitted to tele for chest pain  Likely pain is due to covid infection and respiratory distress  Troponin  negative x2  observe on tele   Cardio- Dr Beard - Presented with chest pain, atypical  - COVID positive, with CXR showing b/l infiltrates  - No further events on Tele   - Likely pain is due to COVID infection and respiratory distress  - Troponin  negative x2  - Observe on tele   - Cardio- Dr Beard

## 2021-01-11 NOTE — PROGRESS NOTE ADULT - SUBJECTIVE AND OBJECTIVE BOX
[facility] NEPHROLOGY- PROGRESS NOTE    Patient is a 84yo Chadian speaking Male with DM, HTN, BPH, HLD p/w abd pain and intermittent chest pain x 7 days. Found to be +COVID-19. Nephrology consulted for Elevated serum creatinine.co    REVIEW OF SYSTEMS:  Gen: no changes in weight  Cards: no chest pain  Resp: + dyspnea improving  GI: no nausea or vomiting or diarrhea  Vascular: no LE edema    No Known Allergies      Hospital Medications: Medications reviewed      VITALS:  T(F): 96.8 (01-11-21 @ 11:12), Max: 97.7 (01-10-21 @ 23:16)  HR: 79 (01-11-21 @ 11:12)  BP: 121/67 (01-11-21 @ 11:12)  RR: 18 (01-11-21 @ 11:12)  SpO2: 98% (01-11-21 @ 11:12)  Wt(kg): --    01-10 @ 07:01  -  01-11 @ 07:00  --------------------------------------------------------  IN: 430 mL / OUT: 450 mL / NET: -20 mL        PHYSICAL EXAM:    Gen: NAD, calm  Cards: RRR, +S1/S2, no M/G/R  Resp: CTA ant  GI: soft, NT/ND, NABS  Vascular: no LE edema B/L      LABS:  01-11    140  |  108  |  39<H>  ----------------------------<  312<H>  4.3   |  21<L>  |  1.57<H>    Ca    8.6      11 Jan 2021 06:37  Phos  3.2     01-11  Mg     2.5     01-11    TPro  6.0  /  Alb  2.5<L>  /  TBili  0.9  /  DBili  0.2  /  AST  90<H>  /  ALT  71<H>  /  AlkPhos  52  01-11    Creatinine Trend: 1.57 <--, 1.59 <--, 1.54 <--, 1.52 <--, 1.64 <--                        14.8   12.64 )-----------( 233      ( 11 Jan 2021 06:37 )             43.1     Urine Studies:  Urinalysis Basic - ( 08 Jan 2021 00:32 )    Color:  / Appearance:  / SG:  / pH:   Gluc:  / Ketone:   / Bili:  / Urobili:    Blood:  / Protein:  / Nitrite:    Leuk Esterase:  / RBC: 10-25 /HPF / WBC 0-2 /HPF   Sq Epi:  / Non Sq Epi: Few /HPF / Bacteria: Few /HPF      Sodium, Random Urine: 56 mmol/L (01-07 @ 20:50)  Osmolality, Random Urine: 816 mos/kg (01-07 @ 20:50)  Creatinine, Random Urine: 148 mg/dL (01-07 @ 20:50)  Chloride, Random Urine: 56 mmol/L (01-07 @ 20:50)

## 2021-01-11 NOTE — PROGRESS NOTE ADULT - SUBJECTIVE AND OBJECTIVE BOX
Interval Events:    tolerating po intake  poc glucose elevated requiring significant sliding scale  receiving dexamethasone standing    Allergies    No Known Allergies    Intolerances      Endocrine/Metabolic Medications:  atorvastatin 40 milliGRAM(s) Oral at bedtime  dexAMETHasone  Injectable 6 milliGRAM(s) IV Push daily  insulin glargine Injectable (LANTUS) 14 Unit(s) SubCutaneous at bedtime  insulin lispro (ADMELOG) corrective regimen sliding scale   SubCutaneous three times a day before meals  insulin lispro Injectable (ADMELOG) 5 Unit(s) SubCutaneous three times a day before meals      Vital Signs Last 24 Hrs  T(C): 35.8 (11 Jan 2021 08:29), Max: 37 (10 Shyam 2021 11:17)  T(F): 96.4 (11 Jan 2021 08:29), Max: 98.6 (10 Shyam 2021 11:17)  HR: 91 (11 Jan 2021 04:48) (71 - 95)  BP: 121/62 (11 Jan 2021 08:29) (100/60 - 125/77)  BP(mean): --  RR: 17 (11 Jan 2021 08:29) (17 - 18)  SpO2: 95% (11 Jan 2021 08:29) (95% - 96%)      PHYSICAL EXAM    Constitutional:    NC/AT:    HEENT:    Neck:  No JVD  Respiratory:  reduced breath sounds b/l bases    Cardiovascular:  RR     Gastrointestinal: Soft     Extremities: without cyanosis    Neurological:  non focal      LABS                        14.8   12.64 )-----------( 233      ( 11 Jan 2021 06:37 )             43.1                               140    |  108    |  39                  Calcium: 8.6   / iCa: x      (01-11 @ 06:37)    ----------------------------<  312       Magnesium: 2.5                              4.3     |  21     |  1.57             Phosphorous: 3.2      TPro  6.0    /  Alb  2.5    /  TBili  0.9    /  DBili  0.2    /  AST  90     /  ALT  71     /  AlkPhos  52     11 Jan 2021 06:37    CAPILLARY BLOOD GLUCOSE      POCT Blood Glucose.: 314 mg/dL (11 Jan 2021 07:46)  POCT Blood Glucose.: 369 mg/dL (10 Shyam 2021 20:50)  POCT Blood Glucose.: 347 mg/dL (10 Shyam 2021 16:43)  POCT Blood Glucose.: 402 mg/dL (10 Shyam 2021 11:30)        Assesment/plan          86 yo male with multiple comorbidities including h/o DM type 2, HTN, HLD admitted with fatigue, chest pain    endocrinology consulted for glycemic management    DM type 2  uncontrolled  HBA1C 7.7  complicated by uncontrolled hyperglycemia, high dose steroid use, acute COVID 19  home regimen:  actos 45mg daily, glipizide 10mg bid, farxiga, semaglutide 14mg daily  follows with Dr Hadley outpatient    recommendations:  high dose steroids- hyperglycemia  - additional insulin lantus 10 units x 1 now 1/11  - increase insulin lantus to 20 units starting tonight 1/11  - increase insulin lispro to 6 units pre meals  - continue current low dose sliding scale  - reassess based on requirements  - goal inpatient glucose range: 140-180mg/dl    tentative discharge regimen:  discussed with patient- may need insulin outpatient depending on doses he requires  he is agreeable to it    CHINYERE  cautious insulin dosing d/t reduced renal insulin clearance    COVID 19  on dexamethasone  remdseivir  hyperglycemic- adjust insulin regimen    HLD  LDL at goal  on repatha, crestor 20 as outpatient  continue atorvastatin 40mg daily while inpatient      Discussed with patient and primary team  Please call Endocrine- - Dr Katarina Sandoval as needed

## 2021-01-11 NOTE — PROGRESS NOTE ADULT - PROBLEM SELECTOR PLAN 6
Has h/o DM   on HSS, lispro and lantus  Monitor ACHS - Has h/o DM  - On HSS, lispro and lantus  - Doses adjusted 1/11 as uncontrolled BS's due to dexa - lantus 20 HS, premeal 6 U  - Liklely needs insulin  on discharge   - Monitor ACHS

## 2021-01-11 NOTE — PROGRESS NOTE ADULT - ASSESSMENT
Patient is a 86yo Beninese speaking Male with DM, HTN, BPH, HLD p/w abd pain and intermittent chest pain x 7 days. Found to be +COVID-19. Nephrology consulted for Elevated serum creatinine.    1. CHINYERE- mild; likely pre-renal due to decreased PO intake. UA with small ketones, 100 protein, large blood RBC 10-25. CK 1350 which should not cause CHINYERE. FeNa 0.45%. Renal function stable s/p IVF; likely at baseline.    Ok to c/w ARB for now, would hold if worsening renal function. Strict I/Os. Avoid nephrotoxins/ NSAIDs/ RCA. Monitor BMP.  2. CKD-3a- Spoke to PMD, Dr. Leonie Ch's office; previous SCr 1.3 on 12/24/20. Defer secondary w/u. Avoid nephrotoxins.   3. BPH- c/w flomax  4.  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 Ok to c/w Losartan for now. Monitor BP  5. COVID-19- Pt on Remdesivir/ steroids. plan as per primary team

## 2021-01-11 NOTE — PHARMACOTHERAPY INTERVENTION NOTE - COMMENTS
Patient admitted for pneumonia due to COVID 19. Pt is on remdesevir and not on any other antibiotics.   No further intervention is necessary.

## 2021-01-11 NOTE — PROGRESS NOTE ADULT - PROBLEM SELECTOR PLAN 4
presented with elevated cr  Monitor BMP  avoid nephrotoxins - Presented with elevated cr  - Monitor BMP  - Avoid nephrotoxins

## 2021-01-11 NOTE — PROGRESS NOTE ADULT - PROBLEM SELECTOR PLAN 2
Presented with respiratory status and chest pain  he is saturating 95% on 4 L NC  COVID positive  f/u inflammatory markers   on decadron and remdisivir day 3  will continue on remdisivir until Ab results  Monitor respiratory status  O2 supplement as needed - Presented with respiratory status and chest pain  - He is saturating 95% on 4 L NC  - COVID positive  - f/u inflammatory markers  - On decadron day 5, remdisivir finished full course   - Monitor respiratory status, O2 supplement as needed

## 2021-01-11 NOTE — PROGRESS NOTE ADULT - SUBJECTIVE AND OBJECTIVE BOX
PGY-1 Progress Note discussed with attending    PAGER #: [[phone]] TILL 5:00 PM  PLEASE CONTACT ON CALL TEAM:  - On Call Team (Please refer to Jag) FROM 5:00 PM - 8:30PM  - Nightfloat Team FROM 8:30 -7:30 AM    CHIEF COMPLAINT & BRIEF HOSPITAL COURSE:    INTERVAL HPI/OVERNIGHT EVENTS:       REVIEW OF SYSTEMS:  CONSTITUTIONAL: No fever, weight loss, or fatigue  RESPIRATORY: No cough, wheezing, chills or hemoptysis; No shortness of breath  CARDIOVASCULAR: No chest pain, palpitations, dizziness, or leg swelling  GASTROINTESTINAL: No abdominal pain. No nausea, vomiting, or hematemesis; No diarrhea or constipation. No melena or hematochezia.  GENITOURINARY: No dysuria or hematuria, urinary frequency  NEUROLOGICAL: No headaches, memory loss, loss of strength, numbness, or tremors  SKIN: No itching, burning, rashes, or lesions     MEDICATIONS  (STANDING):  aspirin enteric coated 81 milliGRAM(s) Oral daily  atorvastatin 40 milliGRAM(s) Oral at bedtime  dexAMETHasone  Injectable 6 milliGRAM(s) IV Push daily  heparin   Injectable 5000 Unit(s) SubCutaneous every 8 hours  insulin glargine Injectable (LANTUS) 14 Unit(s) SubCutaneous at bedtime  insulin lispro (ADMELOG) corrective regimen sliding scale   SubCutaneous three times a day before meals  insulin lispro Injectable (ADMELOG) 5 Unit(s) SubCutaneous three times a day before meals  losartan 25 milliGRAM(s) Oral daily  pantoprazole    Tablet 40 milliGRAM(s) Oral before breakfast  remdesivir  IVPB 100 milliGRAM(s) IV Intermittent every 24 hours  sodium chloride 0.45%. 1000 milliLiter(s) (50 mL/Hr) IV Continuous <Continuous>  tamsulosin 0.4 milliGRAM(s) Oral at bedtime    MEDICATIONS  (PRN):  acetaminophen   Tablet .. 650 milliGRAM(s) Oral every 6 hours PRN Temp greater or equal to 38C (100.4F), Moderate Pain (4 - 6)      Vital Signs Last 24 Hrs  T(C): 35.8 (11 Jan 2021 08:29), Max: 37 (10 Shyam 2021 11:17)  T(F): 96.4 (11 Jan 2021 08:29), Max: 98.6 (10 Shyam 2021 11:17)  HR: 91 (11 Jan 2021 04:48) (71 - 95)  BP: 121/62 (11 Jan 2021 08:29) (100/60 - 125/77)  BP(mean): --  RR: 17 (11 Jan 2021 08:29) (17 - 18)  SpO2: 95% (11 Jan 2021 08:29) (95% - 96%)    PHYSICAL EXAMINATION:  GENERAL: NAD, well built  HEAD:  Atraumatic, Normocephalic  EYES:  conjunctiva and sclera clear  NECK: Supple, No JVD, Normal thyroid  CHEST/LUNG: Clear to auscultation. Clear to percussion bilaterally; No rales, rhonchi, wheezing, or rubs  HEART: Regular rate and rhythm; No murmurs, rubs, or gallops  ABDOMEN: Soft, Nontender, Nondistended; Bowel sounds present  NERVOUS SYSTEM:  Alert & Oriented X3,    EXTREMITIES:  2+ Peripheral Pulses, No clubbing, cyanosis, or edema  SKIN: warm dry                          14.8   12.64 )-----------( 233      ( 11 Jan 2021 06:37 )             43.1     01-11    140  |  108  |  39<H>  ----------------------------<  312<H>  4.3   |  21<L>  |  1.57<H>    Ca    8.6      11 Jan 2021 06:37  Phos  3.2     01-11  Mg     2.5     01-11    TPro  6.0  /  Alb  2.5<L>  /  TBili  0.9  /  DBili  0.2  /  AST  90<H>  /  ALT  71<H>  /  AlkPhos  52  01-11    LIVER FUNCTIONS - ( 11 Jan 2021 06:37 )  Alb: 2.5 g/dL / Pro: 6.0 g/dL / ALK PHOS: 52 U/L / ALT: 71 U/L DA / AST: 90 U/L / GGT: x                   CAPILLARY BLOOD GLUCOSE      RADIOLOGY & ADDITIONAL TESTS:                   PGY-1 Progress Note discussed with attending    PAGER #: [[phone]] TILL 5:00 PM  PLEASE CONTACT ON CALL TEAM:  - On Call Team (Please refer to Jag) FROM 5:00 PM - 8:30PM  - Nightfloat Team FROM 8:30 -7:30 AM    CHIEF COMPLAINT & BRIEF HOSPITAL COURSE:  85 year old male with significant medical history of DM, HLD, HTN, BPH and surgical history of appendectomy presenting to the ED with complaints of abdominal pain and intermittent chest pain for the last 7 days. He had fever 7 days ago, since then he is feeling weak, had two falls, one was 3 days ago and the other was yesterday. He denies LOC, syncope or prodromal symptoms. He says he hit his head at back. Reports that the chest pain is on and off, 5/10, non radiating, feels like pressure and tightness, associated with breathing difficulty. Relates that he has not been able to eat, having loss of appetite, but does feel thirsty. Denies significant weight loss, cough, abdominal pain, nausea, vomiting, headache, visual changes, or any other acute complaints. patient was found to be COVID positive , started on decadron, remdisivir and supportive measures.     INTERVAL HPI/OVERNIGHT EVENTS:   Patient was examined while he was lying in bed, Aox3, responding appropriately to questions, in NAD. He has normal bowel and bladder movements, and denies any other acute complaints. His Blood sugars continued to  be high, requiring 13U overnight, and lantus was increased to 20 HS, with 10 U given stat, and premeals increased to 6U TID. His oxygen saturation is stable at 4L via nasal cannula, saturating 95%.     REVIEW OF SYSTEMS:  CONSTITUTIONAL: No fever, weight loss, or fatigue  RESPIRATORY: Improving Cough, SOB + No wheezing, chills or hemoptysis  CARDIOVASCULAR: No chest pain, palpitations, dizziness, or leg swelling  GASTROINTESTINAL: No abdominal pain. No nausea, vomiting, or hematemesis; No diarrhea or constipation. No melena or hematochezia.  GENITOURINARY: No dysuria or hematuria, urinary frequency  NEUROLOGICAL: No headaches, memory loss, loss of strength, numbness, or tremors  SKIN: No itching, burning, rashes, or lesions     MEDICATIONS  (STANDING):  aspirin enteric coated 81 milliGRAM(s) Oral daily  atorvastatin 40 milliGRAM(s) Oral at bedtime  dexAMETHasone  Injectable 6 milliGRAM(s) IV Push daily  heparin   Injectable 5000 Unit(s) SubCutaneous every 8 hours  insulin glargine Injectable (LANTUS) 14 Unit(s) SubCutaneous at bedtime  insulin lispro (ADMELOG) corrective regimen sliding scale   SubCutaneous three times a day before meals  insulin lispro Injectable (ADMELOG) 5 Unit(s) SubCutaneous three times a day before meals  losartan 25 milliGRAM(s) Oral daily  pantoprazole    Tablet 40 milliGRAM(s) Oral before breakfast  remdesivir  IVPB 100 milliGRAM(s) IV Intermittent every 24 hours  sodium chloride 0.45%. 1000 milliLiter(s) (50 mL/Hr) IV Continuous <Continuous>  tamsulosin 0.4 milliGRAM(s) Oral at bedtime    MEDICATIONS  (PRN):  acetaminophen   Tablet .. 650 milliGRAM(s) Oral every 6 hours PRN Temp greater or equal to 38C (100.4F), Moderate Pain (4 - 6)      Vital Signs Last 24 Hrs  T(C): 35.8 (11 Jan 2021 08:29), Max: 37 (10 Shyam 2021 11:17)  T(F): 96.4 (11 Jan 2021 08:29), Max: 98.6 (10 Shyam 2021 11:17)  HR: 91 (11 Jan 2021 04:48) (71 - 95)  BP: 121/62 (11 Jan 2021 08:29) (100/60 - 125/77)  BP(mean): --  RR: 17 (11 Jan 2021 08:29) (17 - 18)  SpO2: 95% (11 Jan 2021 08:29) (95% - 96%)    PHYSICAL EXAMINATION:  GENERAL: NAD, thin  male, in NAD, on supplemental 02   HEAD:  Atraumatic, Normocephalic  EYES:  conjunctiva and sclera clear  NECK: Supple, No JVD, Normal thyroid  CHEST/LUNG: Clear to auscultation. Clear to percussion bilaterally; No rales, rhonchi, wheezing, or rubs  HEART: Regular rate and rhythm; No murmurs, rubs, or gallops  ABDOMEN: Soft, Nontender, Nondistended; Bowel sounds present  NERVOUS SYSTEM:  Alert & Oriented X3,no motor or sensory loss   EXTREMITIES:  2+ Peripheral Pulses, No clubbing, cyanosis, or edema  SKIN: warm dry                          14.8   12.64 )-----------( 233      ( 11 Jan 2021 06:37 )             43.1     01-11    140  |  108  |  39<H>  ----------------------------<  312<H>  4.3   |  21<L>  |  1.57<H>    Ca    8.6      11 Jan 2021 06:37  Phos  3.2     01-11  Mg     2.5     01-11    TPro  6.0  /  Alb  2.5<L>  /  TBili  0.9  /  DBili  0.2  /  AST  90<H>  /  ALT  71<H>  /  AlkPhos  52  01-11    LIVER FUNCTIONS - ( 11 Jan 2021 06:37 )  Alb: 2.5 g/dL / Pro: 6.0 g/dL / ALK PHOS: 52 U/L / ALT: 71 U/L DA / AST: 90 U/L / GGT: x                   CAPILLARY BLOOD GLUCOSE      RADIOLOGY & ADDITIONAL TESTS:                   PGY-1 Progress Note discussed with attending    PAGER #: [[phone]] TILL 5:00 PM  PLEASE CONTACT ON CALL TEAM:  - On Call Team (Please refer to Jag) FROM 5:00 PM - 8:30PM  - Nightfloat Team FROM 8:30 -7:30 AM    CHIEF COMPLAINT & BRIEF HOSPITAL COURSE:  85 year old male with significant medical history of DM, HLD, HTN, BPH and surgical history of appendectomy presenting to the ED with complaints of abdominal pain and intermittent chest pain for the last 7 days. He had fever 7 days ago, since then he is feeling weak, had two falls, one was 3 days ago and the other was yesterday. He denies LOC, syncope or prodromal symptoms. He says he hit his head at back. Reports that the chest pain is on and off, 5/10, non radiating, feels like pressure and tightness, associated with breathing difficulty. Relates that he has not been able to eat, having loss of appetite, but does feel thirsty. Denies significant weight loss, cough, abdominal pain, nausea, vomiting, headache, visual changes, or any other acute complaints. patient was found to be COVID positive , started on decadron, remdisivir and supportive measures.   US kidney for CHINYERE/ obstruction  deferred due to [place] dept policy.     INTERVAL HPI/OVERNIGHT EVENTS:   Patient was examined while he was lying in bed, Aox3, responding appropriately to questions, in NAD. He has normal bowel and bladder movements, and denies any other acute complaints. His Blood sugars continued to  be high, requiring 13U overnight, and lantus was increased to 20 HS, with 10 U given stat, and premeals increased to 6U TID. His oxygen saturation is stable at 4L via nasal cannula, saturating 95%.     REVIEW OF SYSTEMS:  CONSTITUTIONAL: No fever, weight loss, or fatigue  RESPIRATORY: Improving Cough, SOB + No wheezing, chills or hemoptysis  CARDIOVASCULAR: No chest pain, palpitations, dizziness, or leg swelling  GASTROINTESTINAL: No abdominal pain. No nausea, vomiting, or hematemesis; No diarrhea or constipation. No melena or hematochezia.  GENITOURINARY: No dysuria or hematuria, urinary frequency  NEUROLOGICAL: No headaches, memory loss, loss of strength, numbness, or tremors  SKIN: No itching, burning, rashes, or lesions     MEDICATIONS  (STANDING):  aspirin enteric coated 81 milliGRAM(s) Oral daily  atorvastatin 40 milliGRAM(s) Oral at bedtime  dexAMETHasone  Injectable 6 milliGRAM(s) IV Push daily  heparin   Injectable 5000 Unit(s) SubCutaneous every 8 hours  insulin glargine Injectable (LANTUS) 14 Unit(s) SubCutaneous at bedtime  insulin lispro (ADMELOG) corrective regimen sliding scale   SubCutaneous three times a day before meals  insulin lispro Injectable (ADMELOG) 5 Unit(s) SubCutaneous three times a day before meals  losartan 25 milliGRAM(s) Oral daily  pantoprazole    Tablet 40 milliGRAM(s) Oral before breakfast  remdesivir  IVPB 100 milliGRAM(s) IV Intermittent every 24 hours  sodium chloride 0.45%. 1000 milliLiter(s) (50 mL/Hr) IV Continuous <Continuous>  tamsulosin 0.4 milliGRAM(s) Oral at bedtime    MEDICATIONS  (PRN):  acetaminophen   Tablet .. 650 milliGRAM(s) Oral every 6 hours PRN Temp greater or equal to 38C (100.4F), Moderate Pain (4 - 6)      Vital Signs Last 24 Hrs  T(C): 35.8 (11 Jan 2021 08:29), Max: 37 (10 Shyam 2021 11:17)  T(F): 96.4 (11 Jan 2021 08:29), Max: 98.6 (10 Shyam 2021 11:17)  HR: 91 (11 Jan 2021 04:48) (71 - 95)  BP: 121/62 (11 Jan 2021 08:29) (100/60 - 125/77)  BP(mean): --  RR: 17 (11 Jan 2021 08:29) (17 - 18)  SpO2: 95% (11 Jan 2021 08:29) (95% - 96%)    PHYSICAL EXAMINATION:  GENERAL: NAD, thin  male, in NAD, on supplemental 02   HEAD:  Atraumatic, Normocephalic  EYES:  conjunctiva and sclera clear  NECK: Supple, No JVD, Normal thyroid  CHEST/LUNG: Clear to auscultation. Clear to percussion bilaterally; No rales, rhonchi, wheezing, or rubs  HEART: Regular rate and rhythm; No murmurs, rubs, or gallops  ABDOMEN: Soft, Nontender, Nondistended; Bowel sounds present  NERVOUS SYSTEM:  Alert & Oriented X3,no motor or sensory loss   EXTREMITIES:  2+ Peripheral Pulses, No clubbing, cyanosis, or edema  SKIN: warm dry                          14.8   12.64 )-----------( 233      ( 11 Jan 2021 06:37 )             43.1     01-11    140  |  108  |  39<H>  ----------------------------<  312<H>  4.3   |  21<L>  |  1.57<H>    Ca    8.6      11 Jan 2021 06:37  Phos  3.2     01-11  Mg     2.5     01-11    TPro  6.0  /  Alb  2.5<L>  /  TBili  0.9  /  DBili  0.2  /  AST  90<H>  /  ALT  71<H>  /  AlkPhos  52  01-11    LIVER FUNCTIONS - ( 11 Jan 2021 06:37 )  Alb: 2.5 g/dL / Pro: 6.0 g/dL / ALK PHOS: 52 U/L / ALT: 71 U/L DA / AST: 90 U/L / GGT: x                   CAPILLARY BLOOD GLUCOSE      RADIOLOGY & ADDITIONAL TESTS:

## 2021-01-11 NOTE — PROGRESS NOTE ADULT - PROBLEM SELECTOR PLAN 5
Has h/o HTn  c/w home meds with parameters  monitor BP - Has h/o HTN  - C/w home meds with parameters  - Monitor BP

## 2021-01-12 LAB
ALBUMIN SERPL ELPH-MCNC: 2.4 G/DL — LOW (ref 3.5–5)
ALP SERPL-CCNC: 48 U/L — SIGNIFICANT CHANGE UP (ref 40–120)
ALT FLD-CCNC: 73 U/L DA — HIGH (ref 10–60)
ANION GAP SERPL CALC-SCNC: 9 MMOL/L — SIGNIFICANT CHANGE UP (ref 5–17)
ANISOCYTOSIS BLD QL: SLIGHT — SIGNIFICANT CHANGE UP
ANISOCYTOSIS BLD QL: SLIGHT — SIGNIFICANT CHANGE UP
AST SERPL-CCNC: 67 U/L — HIGH (ref 10–40)
BASOPHILS # BLD AUTO: 0 K/UL — SIGNIFICANT CHANGE UP (ref 0–0.2)
BASOPHILS NFR BLD AUTO: 0 % — SIGNIFICANT CHANGE UP (ref 0–2)
BILIRUB SERPL-MCNC: 0.8 MG/DL — SIGNIFICANT CHANGE UP (ref 0.2–1.2)
BUN SERPL-MCNC: 43 MG/DL — HIGH (ref 7–18)
BURR CELLS BLD QL SMEAR: SLIGHT — SIGNIFICANT CHANGE UP
BURR CELLS BLD QL SMEAR: SLIGHT — SIGNIFICANT CHANGE UP
CALCIUM SERPL-MCNC: 8.7 MG/DL — SIGNIFICANT CHANGE UP (ref 8.4–10.5)
CHLORIDE SERPL-SCNC: 112 MMOL/L — HIGH (ref 96–108)
CK SERPL-CCNC: 96 U/L — SIGNIFICANT CHANGE UP (ref 35–232)
CO2 SERPL-SCNC: 22 MMOL/L — SIGNIFICANT CHANGE UP (ref 22–31)
CREAT SERPL-MCNC: 1.54 MG/DL — HIGH (ref 0.5–1.3)
CRP SERPL-MCNC: 2.08 MG/DL — HIGH (ref 0–0.4)
CULTURE RESULTS: SIGNIFICANT CHANGE UP
CULTURE RESULTS: SIGNIFICANT CHANGE UP
ELLIPTOCYTES BLD QL SMEAR: SLIGHT — SIGNIFICANT CHANGE UP
ELLIPTOCYTES BLD QL SMEAR: SLIGHT — SIGNIFICANT CHANGE UP
EOSINOPHIL # BLD AUTO: 0 K/UL — SIGNIFICANT CHANGE UP (ref 0–0.5)
EOSINOPHIL NFR BLD AUTO: 0 % — SIGNIFICANT CHANGE UP (ref 0–6)
FERRITIN SERPL-MCNC: 2522 NG/ML — HIGH (ref 30–400)
GIANT PLATELETS BLD QL SMEAR: PRESENT — SIGNIFICANT CHANGE UP
GIANT PLATELETS BLD QL SMEAR: PRESENT — SIGNIFICANT CHANGE UP
GLUCOSE BLDC GLUCOMTR-MCNC: 230 MG/DL — HIGH (ref 70–99)
GLUCOSE BLDC GLUCOMTR-MCNC: 242 MG/DL — HIGH (ref 70–99)
GLUCOSE BLDC GLUCOMTR-MCNC: 283 MG/DL — HIGH (ref 70–99)
GLUCOSE BLDC GLUCOMTR-MCNC: 298 MG/DL — HIGH (ref 70–99)
GLUCOSE SERPL-MCNC: 315 MG/DL — HIGH (ref 70–99)
HCT VFR BLD CALC: 42.1 % — SIGNIFICANT CHANGE UP (ref 39–50)
HGB BLD-MCNC: 14.1 G/DL — SIGNIFICANT CHANGE UP (ref 13–17)
LYMPHOCYTES # BLD AUTO: 1 K/UL — SIGNIFICANT CHANGE UP (ref 1–3.3)
LYMPHOCYTES # BLD AUTO: 10 % — LOW (ref 13–44)
MACROCYTES BLD QL: SLIGHT — SIGNIFICANT CHANGE UP
MACROCYTES BLD QL: SLIGHT — SIGNIFICANT CHANGE UP
MANUAL SMEAR VERIFICATION: SIGNIFICANT CHANGE UP
MANUAL SMEAR VERIFICATION: SIGNIFICANT CHANGE UP
MCHC RBC-ENTMCNC: 31 PG — SIGNIFICANT CHANGE UP (ref 27–34)
MCHC RBC-ENTMCNC: 33.5 GM/DL — SIGNIFICANT CHANGE UP (ref 32–36)
MCV RBC AUTO: 92.5 FL — SIGNIFICANT CHANGE UP (ref 80–100)
MONOCYTES # BLD AUTO: 0.3 K/UL — SIGNIFICANT CHANGE UP (ref 0–0.9)
MONOCYTES NFR BLD AUTO: 3 % — SIGNIFICANT CHANGE UP (ref 2–14)
NEUTROPHILS # BLD AUTO: 8.3 K/UL — HIGH (ref 1.8–7.4)
NEUTROPHILS NFR BLD AUTO: 87 % — HIGH (ref 43–77)
NRBC # BLD: 0 /100 — SIGNIFICANT CHANGE UP (ref 0–0)
NRBC # BLD: 0 /100 — SIGNIFICANT CHANGE UP (ref 0–0)
OVALOCYTES BLD QL SMEAR: SLIGHT — SIGNIFICANT CHANGE UP
OVALOCYTES BLD QL SMEAR: SLIGHT — SIGNIFICANT CHANGE UP
PLAT MORPH BLD: ABNORMAL
PLAT MORPH BLD: ABNORMAL
PLATELET # BLD AUTO: 222 K/UL — SIGNIFICANT CHANGE UP (ref 150–400)
PLATELET CLUMP BLD QL SMEAR: ABNORMAL
PLATELET CLUMP BLD QL SMEAR: ABNORMAL
POTASSIUM SERPL-MCNC: 4.5 MMOL/L — SIGNIFICANT CHANGE UP (ref 3.5–5.3)
POTASSIUM SERPL-SCNC: 4.5 MMOL/L — SIGNIFICANT CHANGE UP (ref 3.5–5.3)
PROCALCITONIN SERPL-MCNC: 0.08 NG/ML — SIGNIFICANT CHANGE UP (ref 0.02–0.1)
PROT SERPL-MCNC: 5.5 G/DL — LOW (ref 6–8.3)
RBC # BLD: 4.55 M/UL — SIGNIFICANT CHANGE UP (ref 4.2–5.8)
RBC # FLD: 13.7 % — SIGNIFICANT CHANGE UP (ref 10.3–14.5)
RBC BLD AUTO: ABNORMAL
RBC BLD AUTO: ABNORMAL
SODIUM SERPL-SCNC: 143 MMOL/L — SIGNIFICANT CHANGE UP (ref 135–145)
SPECIMEN SOURCE: SIGNIFICANT CHANGE UP
SPECIMEN SOURCE: SIGNIFICANT CHANGE UP
WBC # BLD: 9.5 K/UL — SIGNIFICANT CHANGE UP (ref 3.8–10.5)
WBC # FLD AUTO: 9.5 K/UL — SIGNIFICANT CHANGE UP (ref 3.8–10.5)

## 2021-01-12 RX ORDER — INSULIN LISPRO 100/ML
VIAL (ML) SUBCUTANEOUS
Refills: 0 | Status: DISCONTINUED | OUTPATIENT
Start: 2021-01-12 | End: 2021-01-13

## 2021-01-12 RX ADMIN — ATORVASTATIN CALCIUM 40 MILLIGRAM(S): 80 TABLET, FILM COATED ORAL at 21:27

## 2021-01-12 RX ADMIN — Medication 6 UNIT(S): at 08:35

## 2021-01-12 RX ADMIN — HEPARIN SODIUM 5000 UNIT(S): 5000 INJECTION INTRAVENOUS; SUBCUTANEOUS at 12:03

## 2021-01-12 RX ADMIN — Medication 6 UNIT(S): at 12:02

## 2021-01-12 RX ADMIN — Medication 6 UNIT(S): at 17:15

## 2021-01-12 RX ADMIN — TAMSULOSIN HYDROCHLORIDE 0.4 MILLIGRAM(S): 0.4 CAPSULE ORAL at 21:27

## 2021-01-12 RX ADMIN — HEPARIN SODIUM 5000 UNIT(S): 5000 INJECTION INTRAVENOUS; SUBCUTANEOUS at 21:27

## 2021-01-12 RX ADMIN — Medication 4: at 17:16

## 2021-01-12 RX ADMIN — Medication 6 MILLIGRAM(S): at 04:18

## 2021-01-12 RX ADMIN — HEPARIN SODIUM 5000 UNIT(S): 5000 INJECTION INTRAVENOUS; SUBCUTANEOUS at 04:15

## 2021-01-12 RX ADMIN — Medication 81 MILLIGRAM(S): at 11:21

## 2021-01-12 RX ADMIN — Medication 6: at 12:03

## 2021-01-12 RX ADMIN — PANTOPRAZOLE SODIUM 40 MILLIGRAM(S): 20 TABLET, DELAYED RELEASE ORAL at 05:09

## 2021-01-12 RX ADMIN — INSULIN GLARGINE 20 UNIT(S): 100 INJECTION, SOLUTION SUBCUTANEOUS at 21:27

## 2021-01-12 RX ADMIN — LOSARTAN POTASSIUM 25 MILLIGRAM(S): 100 TABLET, FILM COATED ORAL at 04:14

## 2021-01-12 NOTE — DIETITIAN INITIAL EVALUATION ADULT. - PROBLEM SELECTOR PLAN 2
Presented with respiratory status and chest pain  Saturating 89% on Ra  Saturation improved to 94% on 2L NC  COVID positive  f/u inflammatory markers  Will start on decadron   Will give one dose of remdesivir, check cr cl tomorrow and resume other doses as cr cl is borderline today  Monitor respiratory status  O2 supplement as needed

## 2021-01-12 NOTE — DIETITIAN INITIAL EVALUATION ADULT. - OTHER INFO
unable to interview patient face to face as has covid-19. unable to contact patient On extension in room as with periods of confusion per RN. Per MD, patient with loss of appetite PTA and No weight loss. Per PCA, patient consuming meals. No GI issues. skin- No pressure ulcers. provide carbohydrate consistent, DASH/TLC diet as ordered

## 2021-01-12 NOTE — DIETITIAN INITIAL EVALUATION ADULT. - PERTINENT LABORATORY DATA
01-12 Na143 mmol/L Glu 315 mg/dL<H> K+ 4.5 mmol/L Cr  1.54 mg/dL<H> BUN 43 mg/dL<H> 01-11 Phos 3.2 mg/dL 01-12 Alb 2.4 g/dL<L> 01-07 Chol 101 mg/dL LDL --    HDL 74 mg/dL Trig 83 mg/dL

## 2021-01-12 NOTE — PROGRESS NOTE ADULT - SUBJECTIVE AND OBJECTIVE BOX
Interval Events:    remains hyperglycemic  requiring significant additional doses on sliding scale  received increased basal/bolus since yesterday    Allergies    No Known Allergies    Intolerances      Endocrine/Metabolic Medications:  atorvastatin 40 milliGRAM(s) Oral at bedtime  insulin glargine Injectable (LANTUS) 20 Unit(s) SubCutaneous at bedtime  insulin lispro (ADMELOG) corrective regimen sliding scale   SubCutaneous three times a day before meals  insulin lispro Injectable (ADMELOG) 6 Unit(s) SubCutaneous three times a day before meals      Vital Signs Last 24 Hrs  T(C): 36.6 (12 Jan 2021 04:48), Max: 36.7 (11 Jan 2021 20:34)  T(F): 97.8 (12 Jan 2021 04:48), Max: 98 (11 Jan 2021 20:34)  HR: 64 (12 Jan 2021 04:48) (62 - 81)  BP: 126/65 (12 Jan 2021 04:48) (119/64 - 127/69)  BP(mean): --  RR: 18 (12 Jan 2021 04:48) (17 - 18)  SpO2: 95% (12 Jan 2021 04:48) (93% - 98%)      PHYSICAL EXAM    Constitutional:    NC/AT:    HEENT:    Neck:  No JVD  Respiratory:  reduced breath sounds b/l bases    Cardiovascular:  RR     Gastrointestinal: Soft     Extremities: without cyanosis    Neurological:  non focal      LABS                        14.8   12.64 )-----------( 233      ( 11 Jan 2021 06:37 )             43.1                               140    |  108    |  39                  Calcium: 8.6   / iCa: x      (01-11 @ 06:37)    ----------------------------<  312       Magnesium: 2.5                              4.3     |  21     |  1.57             Phosphorous: 3.2      TPro  6.0    /  Alb  2.5    /  TBili  0.9    /  DBili  0.2    /  AST  90     /  ALT  71     /  AlkPhos  52     11 Jan 2021 06:37    CAPILLARY BLOOD GLUCOSE      POCT Blood Glucose.: 324 mg/dL (11 Jan 2021 21:02)  POCT Blood Glucose.: 410 mg/dL (11 Jan 2021 16:46)  POCT Blood Glucose.: 358 mg/dL (11 Jan 2021 11:29)  POCT Blood Glucose.: 314 mg/dL (11 Jan 2021 07:46)        Assesment/plan            86 yo male with multiple comorbidities including h/o DM type 2, HTN, HLD admitted with fatigue, chest pain    endocrinology consulted for glycemic management    DM type 2  uncontrolled  HBA1C 7.7  complicated by uncontrolled hyperglycemia, high dose steroid use, acute COVID 19  home regimen:  actos 45mg daily, glipizide 10mg bid, farxiga, semaglutide 14mg daily  follows with Dr Hadley outpatient    recommendations:  high dose steroids- hyperglycemia  - additional insulin lantus 10 units x 1 today 1/12  - increase insulin lantus to 30 units starting tonight 1/11  - increase insulin lispro to 10 units pre meals  - continue current low dose sliding scale  - reassess based on requirements  - goal inpatient glucose range: 140-180mg/dl    tentative discharge regimen:  discussed with patient- may need insulin outpatient depending on doses he requires  he is agreeable to it    CHINYERE  cautious insulin dosing d/t reduced renal insulin clearance    COVID 19  on dexamethasone  remdseivir  hyperglycemic- adjust insulin regimen    HLD  LDL at goal  on repatha, crestor 20 as outpatient  continue atorvastatin 40mg daily while inpatient      Discussed with primary team  Please call Endocrine- [phone]- Dr Katarina Sandoval as needed

## 2021-01-12 NOTE — PROGRESS NOTE ADULT - ASSESSMENT
Patient is a 84yo [place]ian speaking Male with DM, HTN, BPH, HLD p/w abd pain and intermittent chest pain x 7 days. Found to be +COVID-19. Nephrology consulted for Elevated serum creatinine.    1. CHINYERE- mild in the setting of ATN vs at baseline since renal function is stable; Encourage PO intake. UA with small ketones, 100 protein, large blood RBC 10-25. CK 1350 which should not cause CHINYERE. FeNa 0.45%. Renal function stable s/p IVF; likely at baseline.    Ok to c/w ARB for now, would hold if worsening renal function. Strict I/Os. Avoid nephrotoxins/ NSAIDs/ RCA. Monitor BMP.  2. CKD-3a- Spoke to PMD, Dr. Leonie Ch's office; previous SCr 1.3 on 12/24/20. Defer secondary w/u. Avoid nephrotoxins.   3. BPH- c/w flomax  4.  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 Ok to c/w Losartan for now. Monitor BP  5. COVID-19- s/p Remdesivir/ steroids. plan as per primary team

## 2021-01-12 NOTE — DIETITIAN INITIAL EVALUATION ADULT. - PROBLEM SELECTOR PLAN 4
presented with elevated cr  Likely due to dehydration, holding IV fluids in view of covid  f/u urine lytes  Monitor BMP  if cr clearance decreases please hold remdesivir  avoid nephrotoxins

## 2021-01-12 NOTE — PROGRESS NOTE ADULT - PROBLEM SELECTOR PLAN 2
- Presented with respiratory status and chest pain  - He is saturating 95% on 4 L NC  - COVID positive  - f/u inflammatory markers  - On decadron day 5, remdisivir finished full course   - Monitor respiratory status, O2 supplement as needed

## 2021-01-12 NOTE — PROGRESS NOTE ADULT - SUBJECTIVE AND OBJECTIVE BOX
[facility] NEPHROLOGY- PROGRESS NOTE    Patient is a 86yo Maced[place] speaking Male with DM, HTN, BPH, HLD p/w abd pain and intermittent chest pain x 7 days. Found to be +COVID-19. Nephrology consulted for Elevated serum creatinine.co    REVIEW OF SYSTEMS:  Gen: no changes in weight  Cards: no chest pain  Resp: + dyspnea improving  GI: no nausea or vomiting or diarrhea  Vascular: no LE edema    No Known Allergies      Hospital Medications: Medications reviewed    VITALS:  T(F): 97.6 (01-12-21 @ 11:14), Max: 98 (01-11-21 @ 20:34)  HR: 86 (01-12-21 @ 11:14)  BP: 108/65 (01-12-21 @ 11:14)  RR: 19 (01-12-21 @ 11:14)  SpO2: 95% (01-12-21 @ 11:14)  Wt(kg): --    01-11 @ 07:01  -  01-12 @ 07:00  --------------------------------------------------------  IN: 0 mL / OUT: 600 mL / NET: -600 mL      PHYSICAL EXAM:    Gen: NAD, calm  Cards: RRR, +S1/S2, no M/G/R  Resp: CTA ant  GI: soft, NT/ND, NABS  Vascular: no LE edema B/L      LABS:  01-12    143  |  112<H>  |  43<H>  ----------------------------<  315<H>  4.5   |  22  |  1.54<H>    Ca    8.7      12 Jan 2021 07:43  Phos  3.2     01-11  Mg     2.5     01-11    TPro  5.5<L>  /  Alb  2.4<L>  /  TBili  0.8  /  DBili      /  AST  67<H>  /  ALT  73<H>  /  AlkPhos  48  01-12    Creatinine Trend: 1.54 <--, 1.57 <--, 1.59 <--, 1.54 <--, 1.52 <--, 1.64 <--                        14.1   9.50  )-----------( 222      ( 12 Jan 2021 07:43 )             42.1     Urine Studies:  Urinalysis Basic - ( 08 Jan 2021 00:32 )    Color:  / Appearance:  / SG:  / pH:   Gluc:  / Ketone:   / Bili:  / Urobili:    Blood:  / Protein:  / Nitrite:    Leuk Esterase:  / RBC: 10-25 /HPF / WBC 0-2 /HPF   Sq Epi:  / Non Sq Epi: Few /HPF / Bacteria: Few /HPF      Sodium, Random Urine: 56 mmol/L (01-07 @ 20:50)  Osmolality, Random Urine: 816 mos/kg (01-07 @ 20:50)  Creatinine, Random Urine: 148 mg/dL (01-07 @ 20:50)  Chloride, Random Urine: 56 mmol/L (01-07 @ 20:50)

## 2021-01-12 NOTE — PROGRESS NOTE ADULT - PROBLEM SELECTOR PLAN 6
- Has h/o DM  - On HSS, lispro and lantus  - Doses adjusted 1/11 as uncontrolled BS's due to dexa - lantus 20 HS, premeal 6 U  - Liklely needs insulin  on discharge   - Monitor ACHS

## 2021-01-12 NOTE — PROGRESS NOTE ADULT - PROBLEM SELECTOR PLAN 8
- On heparin for DVT prophylaxis - On heparin for DVT prophylaxis  - PT eval for possible ambulatory dysfunction

## 2021-01-12 NOTE — DIETITIAN INITIAL EVALUATION ADULT. - PROBLEM SELECTOR PLAN 5
Has h/o HTn  c/w home meds with parameters  Restart once medications are reconciled from pharmacy  monitor BP

## 2021-01-12 NOTE — PROGRESS NOTE ADULT - SUBJECTIVE AND OBJECTIVE BOX
PGY-1 Progress Note discussed with attending    PAGER #: [[phone]] TILL 5:00 PM  PLEASE CONTACT ON CALL TEAM:  - On Call Team (Please refer to Jag) FROM 5:00 PM - 8:30PM  - Nightfloat Team FROM 8:30 -7:30 AM    CHIEF COMPLAINT & BRIEF HOSPITAL COURSE:    INTERVAL HPI/OVERNIGHT EVENTS:       REVIEW OF SYSTEMS:  CONSTITUTIONAL: No fever, weight loss, or fatigue  RESPIRATORY: No cough, wheezing, chills or hemoptysis; No shortness of breath  CARDIOVASCULAR: No chest pain, palpitations, dizziness, or leg swelling  GASTROINTESTINAL: No abdominal pain. No nausea, vomiting, or hematemesis; No diarrhea or constipation. No melena or hematochezia.  GENITOURINARY: No dysuria or hematuria, urinary frequency  NEUROLOGICAL: No headaches, memory loss, loss of strength, numbness, or tremors  SKIN: No itching, burning, rashes, or lesions     MEDICATIONS  (STANDING):  aspirin enteric coated 81 milliGRAM(s) Oral daily  atorvastatin 40 milliGRAM(s) Oral at bedtime  heparin   Injectable 5000 Unit(s) SubCutaneous every 8 hours  insulin glargine Injectable (LANTUS) 20 Unit(s) SubCutaneous at bedtime  insulin lispro (ADMELOG) corrective regimen sliding scale   SubCutaneous three times a day before meals  insulin lispro Injectable (ADMELOG) 6 Unit(s) SubCutaneous three times a day before meals  losartan 25 milliGRAM(s) Oral daily  pantoprazole    Tablet 40 milliGRAM(s) Oral before breakfast  sodium chloride 0.45%. 1000 milliLiter(s) (50 mL/Hr) IV Continuous <Continuous>  tamsulosin 0.4 milliGRAM(s) Oral at bedtime    MEDICATIONS  (PRN):  acetaminophen   Tablet .. 650 milliGRAM(s) Oral every 6 hours PRN Temp greater or equal to 38C (100.4F), Moderate Pain (4 - 6)      Vital Signs Last 24 Hrs  T(C): 36.7 (12 Jan 2021 07:20), Max: 36.7 (11 Jan 2021 20:34)  T(F): 98 (12 Jan 2021 07:20), Max: 98 (11 Jan 2021 20:34)  HR: 89 (12 Jan 2021 07:20) (62 - 89)  BP: 137/70 (12 Jan 2021 07:20) (119/64 - 137/70)  BP(mean): --  RR: 19 (12 Jan 2021 07:20) (18 - 19)  SpO2: 94% (12 Jan 2021 07:20) (93% - 98%)    PHYSICAL EXAMINATION:  GENERAL: NAD, well built  HEAD:  Atraumatic, Normocephalic  EYES:  conjunctiva and sclera clear  NECK: Supple, No JVD, Normal thyroid  CHEST/LUNG: Clear to auscultation. Clear to percussion bilaterally; No rales, rhonchi, wheezing, or rubs  HEART: Regular rate and rhythm; No murmurs, rubs, or gallops  ABDOMEN: Soft, Nontender, Nondistended; Bowel sounds present  NERVOUS SYSTEM:  Alert & Oriented X3,    EXTREMITIES:  2+ Peripheral Pulses, No clubbing, cyanosis, or edema  SKIN: warm dry                          14.1   9.50  )-----------( 222      ( 12 Jan 2021 07:43 )             42.1     01-12    143  |  112<H>  |  43<H>  ----------------------------<  315<H>  4.5   |  22  |  1.54<H>    Ca    8.7      12 Jan 2021 07:43  Phos  3.2     01-11  Mg     2.5     01-11    TPro  5.5<L>  /  Alb  2.4<L>  /  TBili  0.8  /  DBili  x   /  AST  67<H>  /  ALT  73<H>  /  AlkPhos  48  01-12    LIVER FUNCTIONS - ( 12 Jan 2021 07:43 )  Alb: 2.4 g/dL / Pro: 5.5 g/dL / ALK PHOS: 48 U/L / ALT: 73 U/L DA / AST: 67 U/L / GGT: x           CARDIAC MARKERS ( 12 Jan 2021 07:43 )  x     / x     / 96 U/L / x     / x              CAPILLARY BLOOD GLUCOSE      RADIOLOGY & ADDITIONAL TESTS:                   PGY-1 Progress Note discussed with attending    PAGER #: [[phone]] TILL 5:00 PM  PLEASE CONTACT ON CALL TEAM:  - On Call Team (Please refer to Jag) FROM 5:00 PM - 8:30PM  - Nightfloat Team FROM 8:30 -7:30 AM    CHIEF COMPLAINT & BRIEF HOSPITAL COURSE:  85 year old male with significant medical history of DM, HLD, HTN, BPH and surgical history of appendectomy presenting to the ED with complaints of abdominal pain and intermittent chest pain for the last 7 days. He had fever 7 days ago, since then he is feeling weak, had two falls, one was 3 days ago and the other was yesterday. He denies LOC, syncope or prodromal symptoms. He says he hit his head at back. Reports that the chest pain is on and off, 5/10, non radiating, feels like pressure and tightness, associated with breathing difficulty. Relates that he has not been able to eat, having loss of appetite, but does feel thirsty. Denies significant weight loss, cough, abdominal pain, nausea, vomiting, headache, visual changes, or any other acute complaints. patient was found to be COVID positive , started on decadron, remdisivir and supportive measures.   US kidney for CHINYERE/ obstruction  deferred due to Ultrasound dept policy.     INTERVAL HPI/OVERNIGHT EVENTS:   Patient was examined while he was lying in bed, Aox1-2, somewhat confused, not responding entirely appropriately to questions in NAD. He has normal bowel and bladder movements, and denies any other acute complaints. He continues to require 4L of 02 to saturate around 94%. Pt's son reports that the patient has had multiple falls over the past few weeks and the pt also complains of some intermittent back pain; follow up PT eval and Xray of LS.     REVIEW OF SYSTEMS:  CONSTITUTIONAL: No fever, weight loss, or fatigue  RESPIRATORY: Improving Cough, SOB + No wheezing, chills or hemoptysis  CARDIOVASCULAR: No chest pain, palpitations, dizziness, or leg swelling  GASTROINTESTINAL: No abdominal pain. No nausea, vomiting, or hematemesis; No diarrhea or constipation. No melena or hematochezia.  GENITOURINARY: No dysuria or hematuria, urinary frequency  NEUROLOGICAL: No headaches, memory loss, loss of strength, numbness, or tremors  SKIN: No itching, burning, rashes, or lesions     MEDICATIONS  (STANDING):  aspirin enteric coated 81 milliGRAM(s) Oral daily  atorvastatin 40 milliGRAM(s) Oral at bedtime  heparin   Injectable 5000 Unit(s) SubCutaneous every 8 hours  insulin glargine Injectable (LANTUS) 20 Unit(s) SubCutaneous at bedtime  insulin lispro (ADMELOG) corrective regimen sliding scale   SubCutaneous three times a day before meals  insulin lispro Injectable (ADMELOG) 6 Unit(s) SubCutaneous three times a day before meals  losartan 25 milliGRAM(s) Oral daily  pantoprazole    Tablet 40 milliGRAM(s) Oral before breakfast  sodium chloride 0.45%. 1000 milliLiter(s) (50 mL/Hr) IV Continuous <Continuous>  tamsulosin 0.4 milliGRAM(s) Oral at bedtime    MEDICATIONS  (PRN):  acetaminophen   Tablet .. 650 milliGRAM(s) Oral every 6 hours PRN Temp greater or equal to 38C (100.4F), Moderate Pain (4 - 6)      Vital Signs Last 24 Hrs  T(C): 36.7 (12 Jan 2021 07:20), Max: 36.7 (11 Jan 2021 20:34)  T(F): 98 (12 Jan 2021 07:20), Max: 98 (11 Jan 2021 20:34)  HR: 89 (12 Jan 2021 07:20) (62 - 89)  BP: 137/70 (12 Jan 2021 07:20) (119/64 - 137/70)  BP(mean): --  RR: 19 (12 Jan 2021 07:20) (18 - 19)  SpO2: 94% (12 Jan 2021 07:20) (93% - 98%)    PHYSICAL EXAMINATION:  GENERAL: NAD, thin  male, in NAD, on supplemental 02   HEAD:  Atraumatic, Normocephalic  EYES:  conjunctiva and sclera clear  NECK: Supple, No JVD, Normal thyroid  CHEST/LUNG: Clear to auscultation. Clear to percussion bilaterally; No rales, rhonchi, wheezing, or rubs  HEART: Regular rate and rhythm; No murmurs, rubs, or gallops  ABDOMEN: Soft, Nontender, Nondistended; Bowel sounds present  NERVOUS SYSTEM:  Alert & Oriented X3,no motor or sensory loss   EXTREMITIES:  2+ Peripheral Pulses, No clubbing, cyanosis, or edema  SKIN: warm dry                            14.1   9.50  )-----------( 222      ( 12 Jan 2021 07:43 )             42.1     01-12    143  |  112<H>  |  43<H>  ----------------------------<  315<H>  4.5   |  22  |  1.54<H>    Ca    8.7      12 Jan 2021 07:43  Phos  3.2     01-11  Mg     2.5     01-11    TPro  5.5<L>  /  Alb  2.4<L>  /  TBili  0.8  /  DBili  x   /  AST  67<H>  /  ALT  73<H>  /  AlkPhos  48  01-12    LIVER FUNCTIONS - ( 12 Jan 2021 07:43 )  Alb: 2.4 g/dL / Pro: 5.5 g/dL / ALK PHOS: 48 U/L / ALT: 73 U/L DA / AST: 67 U/L / GGT: x           CARDIAC MARKERS ( 12 Jan 2021 07:43 )  x     / x     / 96 U/L / x     / x              CAPILLARY BLOOD GLUCOSE      RADIOLOGY & ADDITIONAL TESTS:

## 2021-01-12 NOTE — PROGRESS NOTE ADULT - PROBLEM SELECTOR PLAN 1
- Presented with chest pain, atypical  - COVID positive, with CXR showing b/l infiltrates  - No further events on Tele   - Likely pain is due to COVID infection and respiratory distress  - Troponin  negative x2  - Observe on tele   - Cardio- Dr Beard

## 2021-01-13 LAB
ALBUMIN SERPL ELPH-MCNC: 2.4 G/DL — LOW (ref 3.5–5)
ALP SERPL-CCNC: 50 U/L — SIGNIFICANT CHANGE UP (ref 40–120)
ALT FLD-CCNC: 83 U/L DA — HIGH (ref 10–60)
ANION GAP SERPL CALC-SCNC: 10 MMOL/L — SIGNIFICANT CHANGE UP (ref 5–17)
AST SERPL-CCNC: 68 U/L — HIGH (ref 10–40)
BILIRUB DIRECT SERPL-MCNC: 0.2 MG/DL — SIGNIFICANT CHANGE UP (ref 0–0.2)
BILIRUB INDIRECT FLD-MCNC: 0.7 MG/DL — SIGNIFICANT CHANGE UP (ref 0.2–1)
BILIRUB SERPL-MCNC: 0.9 MG/DL — SIGNIFICANT CHANGE UP (ref 0.2–1.2)
BUN SERPL-MCNC: 49 MG/DL — HIGH (ref 7–18)
CALCIUM SERPL-MCNC: 8.6 MG/DL — SIGNIFICANT CHANGE UP (ref 8.4–10.5)
CHLORIDE SERPL-SCNC: 110 MMOL/L — HIGH (ref 96–108)
CO2 SERPL-SCNC: 23 MMOL/L — SIGNIFICANT CHANGE UP (ref 22–31)
CREAT SERPL-MCNC: 1.32 MG/DL — HIGH (ref 0.5–1.3)
GLUCOSE BLDC GLUCOMTR-MCNC: 135 MG/DL — HIGH (ref 70–99)
GLUCOSE BLDC GLUCOMTR-MCNC: 167 MG/DL — HIGH (ref 70–99)
GLUCOSE BLDC GLUCOMTR-MCNC: 210 MG/DL — HIGH (ref 70–99)
GLUCOSE BLDC GLUCOMTR-MCNC: 226 MG/DL — HIGH (ref 70–99)
GLUCOSE SERPL-MCNC: 228 MG/DL — HIGH (ref 70–99)
HCT VFR BLD CALC: 41.4 % — SIGNIFICANT CHANGE UP (ref 39–50)
HGB BLD-MCNC: 14 G/DL — SIGNIFICANT CHANGE UP (ref 13–17)
MCHC RBC-ENTMCNC: 30.9 PG — SIGNIFICANT CHANGE UP (ref 27–34)
MCHC RBC-ENTMCNC: 33.8 GM/DL — SIGNIFICANT CHANGE UP (ref 32–36)
MCV RBC AUTO: 91.4 FL — SIGNIFICANT CHANGE UP (ref 80–100)
NRBC # BLD: 0 /100 WBCS — SIGNIFICANT CHANGE UP (ref 0–0)
PLATELET # BLD AUTO: 223 K/UL — SIGNIFICANT CHANGE UP (ref 150–400)
POTASSIUM SERPL-MCNC: 3.8 MMOL/L — SIGNIFICANT CHANGE UP (ref 3.5–5.3)
POTASSIUM SERPL-SCNC: 3.8 MMOL/L — SIGNIFICANT CHANGE UP (ref 3.5–5.3)
PROT SERPL-MCNC: 5.4 G/DL — LOW (ref 6–8.3)
RBC # BLD: 4.53 M/UL — SIGNIFICANT CHANGE UP (ref 4.2–5.8)
RBC # FLD: 13.7 % — SIGNIFICANT CHANGE UP (ref 10.3–14.5)
SODIUM SERPL-SCNC: 143 MMOL/L — SIGNIFICANT CHANGE UP (ref 135–145)
WBC # BLD: 10.29 K/UL — SIGNIFICANT CHANGE UP (ref 3.8–10.5)
WBC # FLD AUTO: 10.29 K/UL — SIGNIFICANT CHANGE UP (ref 3.8–10.5)

## 2021-01-13 RX ORDER — INSULIN GLARGINE 100 [IU]/ML
30 INJECTION, SOLUTION SUBCUTANEOUS AT BEDTIME
Refills: 0 | Status: DISCONTINUED | OUTPATIENT
Start: 2021-01-13 | End: 2021-01-14

## 2021-01-13 RX ORDER — INSULIN LISPRO 100/ML
10 VIAL (ML) SUBCUTANEOUS
Refills: 0 | Status: DISCONTINUED | OUTPATIENT
Start: 2021-01-13 | End: 2021-01-14

## 2021-01-13 RX ORDER — INSULIN LISPRO 100/ML
VIAL (ML) SUBCUTANEOUS
Refills: 0 | Status: DISCONTINUED | OUTPATIENT
Start: 2021-01-13 | End: 2021-01-15

## 2021-01-13 RX ADMIN — INSULIN GLARGINE 30 UNIT(S): 100 INJECTION, SOLUTION SUBCUTANEOUS at 21:31

## 2021-01-13 RX ADMIN — HEPARIN SODIUM 5000 UNIT(S): 5000 INJECTION INTRAVENOUS; SUBCUTANEOUS at 14:26

## 2021-01-13 RX ADMIN — Medication 10 UNIT(S): at 17:26

## 2021-01-13 RX ADMIN — TAMSULOSIN HYDROCHLORIDE 0.4 MILLIGRAM(S): 0.4 CAPSULE ORAL at 21:31

## 2021-01-13 RX ADMIN — Medication 4: at 11:44

## 2021-01-13 RX ADMIN — ATORVASTATIN CALCIUM 40 MILLIGRAM(S): 80 TABLET, FILM COATED ORAL at 21:31

## 2021-01-13 RX ADMIN — Medication 81 MILLIGRAM(S): at 11:01

## 2021-01-13 RX ADMIN — HEPARIN SODIUM 5000 UNIT(S): 5000 INJECTION INTRAVENOUS; SUBCUTANEOUS at 05:32

## 2021-01-13 RX ADMIN — HEPARIN SODIUM 5000 UNIT(S): 5000 INJECTION INTRAVENOUS; SUBCUTANEOUS at 21:31

## 2021-01-13 RX ADMIN — PANTOPRAZOLE SODIUM 40 MILLIGRAM(S): 20 TABLET, DELAYED RELEASE ORAL at 05:32

## 2021-01-13 RX ADMIN — Medication 10 UNIT(S): at 11:44

## 2021-01-13 RX ADMIN — Medication 1: at 17:26

## 2021-01-13 NOTE — PHYSICAL THERAPY INITIAL EVALUATION ADULT - ADDITIONAL COMMENTS
Patient was previously independent in all function and did not requires using Assistive Device. Patient states that he does not have to perform stairs in his apartment because there is an elevator.

## 2021-01-13 NOTE — PHYSICAL THERAPY INITIAL EVALUATION ADULT - GENERAL OBSERVATIONS, REHAB EVAL
Patient received supine in bed, COVID +, 3 L nasal cannulus and monitor in place. Patient AxOx3 and speaks primarily Cameroonian.  ID# [id number] used.

## 2021-01-13 NOTE — PROGRESS NOTE ADULT - SUBJECTIVE AND OBJECTIVE BOX
[facility] NEPHROLOGY- PROGRESS NOTE    Patient is a 84yo Indonesian speaking Male with DM, HTN, BPH, HLD p/w abd pain and intermittent chest pain x 7 days. Found to be +COVID-19. Nephrology consulted for Elevated serum creatinine.co    REVIEW OF SYSTEMS: Deferred    No Known Allergies      Hospital Medications: Medications reviewed    VITALS:  T(F): 98.6 (01-13-21 @ 15:30), Max: 98.7 (01-13-21 @ 05:32)  HR: 77 (01-13-21 @ 15:30)  BP: 102/57 (01-13-21 @ 15:30)  RR: 18 (01-13-21 @ 15:30)  SpO2: 98% (01-13-21 @ 15:30)  Wt(kg): --    01-13 @ 07:01  -  01-13 @ 18:26  --------------------------------------------------------  IN: 430 mL / OUT: 0 mL / NET: 430 mL      PHYSICAL EXAM: Defer physical exam due to COVID-19 status. Please refer to primary team's note for physical exam.    LABS:  01-13    143  |  110<H>  |  49<H>  ----------------------------<  228<H>  3.8   |  23  |  1.32<H>    Ca    8.6      13 Jan 2021 08:25    TPro  5.4<L>  /  Alb  2.4<L>  /  TBili  0.9  /  DBili  0.2  /  AST  68<H>  /  ALT  83<H>  /  AlkPhos  50  01-13    Creatinine Trend: 1.32 <--, 1.54 <--, 1.57 <--, 1.59 <--, 1.54 <--, 1.52 <--, 1.64 <--                        14.0   10.29 )-----------( 223      ( 13 Jan 2021 08:25 )             41.4     Urine Studies:  Urinalysis Basic - ( 08 Jan 2021 00:32 )    Color:  / Appearance:  / SG:  / pH:   Gluc:  / Ketone:   / Bili:  / Urobili:    Blood:  / Protein:  / Nitrite:    Leuk Esterase:  / RBC: 10-25 /HPF / WBC 0-2 /HPF   Sq Epi:  / Non Sq Epi: Few /HPF / Bacteria: Few /HPF      Sodium, Random Urine: 56 mmol/L (01-07 @ 20:50)  Osmolality, Random Urine: 816 mos/kg (01-07 @ 20:50)  Creatinine, Random Urine: 148 mg/dL (01-07 @ 20:50)  Chloride, Random Urine: 56 mmol/L (01-07 @ 20:50)

## 2021-01-13 NOTE — PROGRESS NOTE ADULT - SUBJECTIVE AND OBJECTIVE BOX
Interval Events:    tolerating po intake  poc glucose elevated  requiring significant additional doses on ss  completed dexamthasone 1/12    Allergies    No Known Allergies    Intolerances      Endocrine/Metabolic Medications:  atorvastatin 40 milliGRAM(s) Oral at bedtime  insulin glargine Injectable (LANTUS) 30 Unit(s) SubCutaneous at bedtime  insulin lispro (ADMELOG) corrective regimen sliding scale   SubCutaneous three times a day before meals  insulin lispro Injectable (ADMELOG) 10 Unit(s) SubCutaneous three times a day before meals      Vital Signs Last 24 Hrs  T(C): 36.9 (13 Jan 2021 07:46), Max: 37.1 (13 Jan 2021 05:32)  T(F): 98.5 (13 Jan 2021 07:46), Max: 98.7 (13 Jan 2021 05:32)  HR: 80 (13 Jan 2021 07:46) (63 - 87)  BP: 128/83 (13 Jan 2021 07:46) (105/58 - 129/75)  BP(mean): --  RR: 16 (13 Jan 2021 07:46) (16 - 19)  SpO2: 97% (13 Jan 2021 07:46) (95% - 100%)      PHYSICAL EXAM    Constitutional:    NC/AT:    HEENT:    Neck:  No JVD  Respiratory:  reduced breath sounds b/l bases    Cardiovascular:  RR     Gastrointestinal: Soft     Extremities: without cyanosis    Neurological:  non focal      LABS                        14.0   10.29 )-----------( 223      ( 13 Jan 2021 08:25 )             41.4                               143    |  110    |  49                  Calcium: 8.6   / iCa: x      (01-13 @ 08:25)    ----------------------------<  228       Magnesium: x                                3.8     |  23     |  1.32             Phosphorous: x        TPro  x      /  Alb  2.4    /  TBili  x      /  DBili  x      /  AST  x      /  ALT  x      /  AlkPhos  x      13 Jan 2021 08:25    CAPILLARY BLOOD GLUCOSE      POCT Blood Glucose.: 210 mg/dL (13 Jan 2021 07:30)  POCT Blood Glucose.: 230 mg/dL (12 Jan 2021 21:00)  POCT Blood Glucose.: 242 mg/dL (12 Jan 2021 16:59)  POCT Blood Glucose.: 298 mg/dL (12 Jan 2021 11:46)        Assesment/plan        84 yo male with multiple comorbidities including h/o DM type 2, HTN, HLD admitted with fatigue, chest pain    endocrinology consulted for glycemic management    DM type 2  uncontrolled  HBA1C 7.7  complicated by uncontrolled hyperglycemia, high dose steroid use, acute COVID 19  home regimen:  actos 45mg daily, glipizide 10mg bid, farxiga, semaglutide 14mg daily  follows with Dr Hadley outpatient    recommendations:  high dose steroids- hyperglycemia  completed dexamethasone on 1/12    - continue insulin lantus 30 units daily  - continue insulin lispro 10 units pre meals  - reduce to low dose sliding scale  - reassess based on requirements  - goal inpatient glucose range: 140-180mg/dl    tentative discharge regimen:  discussed with patient- may need insulin outpatient depending on doses he requires  he is agreeable to it    CHINYERE  cautious insulin dosing d/t reduced renal insulin clearance    COVID 19  on dexamethasone  remdseivir  hyperglycemic- adjust insulin regimen    HLD  LDL at goal  on repatha, crestor 20 as outpatient  continue atorvastatin 40mg daily while inpatient      Discussed with primary team  Please call Endocrine- - Dr Katarina Sandoval as needed

## 2021-01-13 NOTE — PHYSICAL THERAPY INITIAL EVALUATION ADULT - ACTIVE RANGE OF MOTION EXAMINATION, REHAB EVAL
no Active ROM deficits were identified/bhaskar. upper extremity Active ROM was WNL (within normal limits)/bilateral lower extremity Active ROM was WNL (within normal limits)

## 2021-01-13 NOTE — PROGRESS NOTE ADULT - PROBLEM SELECTOR PLAN 6
- Has h/o DM  - On HSS, lispro and lantus  - Doses adjusted 1/11 as uncontrolled BS's due to dexa - lantus 20 HS, premeal 6 U  - Liklely needs insulin  on discharge   - Monitor ACHS - Has h/o DM  - On HSS, lispro and lantus  - Doses adjusted 1/11 as uncontrolled BS's due to dexa - lantus 30 HS, premeal 10U  - Needs diabetic education/ insulin injection education  - Likely needs insulin  on discharge   - Monitor ACHS

## 2021-01-13 NOTE — PHYSICAL THERAPY INITIAL EVALUATION ADULT - LIVES WITH, PROFILE
Patient states that he lives alone and is able to manage on his own. States that his son comes over occasionally to help him./alone

## 2021-01-13 NOTE — PHYSICAL THERAPY INITIAL EVALUATION ADULT - PERTINENT HX OF CURRENT PROBLEM, REHAB EVAL
Patient presents to [facility] following c/o of intermittent chest pain, abdominal pain and generalized weakness. Patient reports that he had 2 falls prior to admittance because "both of his legs felt weak." Patient has pertinent PMH of DM, HLD, HTN, and BPH.

## 2021-01-13 NOTE — PROGRESS NOTE ADULT - PROBLEM SELECTOR PLAN 8
- On heparin for DVT prophylaxis  - PT eval for possible ambulatory dysfunction - On heparin for DVT prophylaxis  - PT eval for possible ambulatory dysfunction -> home with home PT

## 2021-01-13 NOTE — PHYSICAL THERAPY INITIAL EVALUATION ADULT - IMPAIRED TRANSFERS: SIT/STAND, REHAB EVAL
Patient independent in sit to stand, however requires rest break secondary to decreased aerobic capacity and generalized weakness from COVID +/impaired balance/decreased strength

## 2021-01-13 NOTE — PROGRESS NOTE ADULT - SUBJECTIVE AND OBJECTIVE BOX
PGY-1 Progress Note discussed with attending    PAGER #: [[phone]] TILL 5:00 PM  PLEASE CONTACT ON CALL TEAM:  - On Call Team (Please refer to Jag) FROM 5:00 PM - 8:30PM  - Nightfloat Team FROM 8:30 -7:30 AM    CHIEF COMPLAINT & BRIEF HOSPITAL COURSE:    INTERVAL HPI/OVERNIGHT EVENTS:       REVIEW OF SYSTEMS:  CONSTITUTIONAL: No fever, weight loss, or fatigue  RESPIRATORY: No cough, wheezing, chills or hemoptysis; No shortness of breath  CARDIOVASCULAR: No chest pain, palpitations, dizziness, or leg swelling  GASTROINTESTINAL: No abdominal pain. No nausea, vomiting, or hematemesis; No diarrhea or constipation. No melena or hematochezia.  GENITOURINARY: No dysuria or hematuria, urinary frequency  NEUROLOGICAL: No headaches, memory loss, loss of strength, numbness, or tremors  SKIN: No itching, burning, rashes, or lesions     MEDICATIONS  (STANDING):  aspirin enteric coated 81 milliGRAM(s) Oral daily  atorvastatin 40 milliGRAM(s) Oral at bedtime  heparin   Injectable 5000 Unit(s) SubCutaneous every 8 hours  insulin glargine Injectable (LANTUS) 30 Unit(s) SubCutaneous at bedtime  insulin lispro (ADMELOG) corrective regimen sliding scale   SubCutaneous three times a day before meals  insulin lispro Injectable (ADMELOG) 10 Unit(s) SubCutaneous three times a day before meals  losartan 25 milliGRAM(s) Oral daily  pantoprazole    Tablet 40 milliGRAM(s) Oral before breakfast  sodium chloride 0.45%. 1000 milliLiter(s) (50 mL/Hr) IV Continuous <Continuous>  tamsulosin 0.4 milliGRAM(s) Oral at bedtime    MEDICATIONS  (PRN):  acetaminophen   Tablet .. 650 milliGRAM(s) Oral every 6 hours PRN Temp greater or equal to 38C (100.4F), Moderate Pain (4 - 6)      Vital Signs Last 24 Hrs  T(C): 37 (13 Jan 2021 11:12), Max: 37.1 (13 Jan 2021 05:32)  T(F): 98.6 (13 Jan 2021 11:12), Max: 98.7 (13 Jan 2021 05:32)  HR: 84 (13 Jan 2021 11:12) (63 - 87)  BP: 100/56 (13 Jan 2021 11:12) (100/56 - 129/75)  BP(mean): --  RR: 16 (13 Jan 2021 11:12) (16 - 16)  SpO2: 96% (13 Jan 2021 11:12) (95% - 100%)    PHYSICAL EXAMINATION:  GENERAL: NAD, well built  HEAD:  Atraumatic, Normocephalic  EYES:  conjunctiva and sclera clear  NECK: Supple, No JVD, Normal thyroid  CHEST/LUNG: Clear to auscultation. Clear to percussion bilaterally; No rales, rhonchi, wheezing, or rubs  HEART: Regular rate and rhythm; No murmurs, rubs, or gallops  ABDOMEN: Soft, Nontender, Nondistended; Bowel sounds present  NERVOUS SYSTEM:  Alert & Oriented X3,    EXTREMITIES:  2+ Peripheral Pulses, No clubbing, cyanosis, or edema  SKIN: warm dry                          14.0   10.29 )-----------( 223      ( 13 Jan 2021 08:25 )             41.4     01-13    143  |  110<H>  |  49<H>  ----------------------------<  228<H>  3.8   |  23  |  1.32<H>    Ca    8.6      13 Jan 2021 08:25    TPro  5.4<L>  /  Alb  2.4<L>  /  TBili  0.9  /  DBili  0.2  /  AST  68<H>  /  ALT  83<H>  /  AlkPhos  50  01-13    LIVER FUNCTIONS - ( 13 Jan 2021 08:25 )  Alb: 2.4 g/dL / Pro: 5.4 g/dL / ALK PHOS: 50 U/L / ALT: 83 U/L DA / AST: 68 U/L / GGT: x           CARDIAC MARKERS ( 12 Jan 2021 07:43 )  x     / x     / 96 U/L / x     / x              CAPILLARY BLOOD GLUCOSE      RADIOLOGY & ADDITIONAL TESTS:                   PGY-1 Progress Note discussed with attending    PAGER #: [[phone]] TILL 5:00 PM  PLEASE CONTACT ON CALL TEAM:  - On Call Team (Please refer to Jag) FROM 5:00 PM - 8:30PM  - Nightfloat Team FROM 8:30 -7:30 AM    CHIEF COMPLAINT & BRIEF HOSPITAL COURSE:  85 year old male with significant medical history of DM, HLD, HTN, BPH and surgical history of appendectomy presenting to the ED with complaints of abdominal pain and intermittent chest pain for the last 7 days. He had fever 7 days ago, since then he is feeling weak, had two falls, one was 3 days ago and the other was yesterday. He denies LOC, syncope or prodromal symptoms. He says he hit his head at back. Reports that the chest pain is on and off, 5/10, non radiating, feels like pressure and tightness, associated with breathing difficulty. Relates that he has not been able to eat, having loss of appetite, but does feel thirsty. Denies significant weight loss, cough, abdominal pain, nausea, vomiting, headache, visual changes, or any other acute complaints. patient was found to be COVID positive , started on decadron, remdisivir and supportive measures.   US kidney for CHINYERE/ obstruction  deferred due to Ultrasound dept policy.     INTERVAL HPI/OVERNIGHT EVENTS:   Patient was examined while he was lying in bed, Aox2-3, responding appropriately to questions, in NAD. He has normal bowel and bladder movements, and denies any other acute complaints such as chest pain, palpitations, or SOB while on NC 3L.     REVIEW OF SYSTEMS:  CONSTITUTIONAL: No fever, weight loss, or fatigue  RESPIRATORY: Improving Cough, SOB + No wheezing, chills or hemoptysis  CARDIOVASCULAR: No chest pain, palpitations, dizziness, or leg swelling  GASTROINTESTINAL: No abdominal pain. No nausea, vomiting, or hematemesis; No diarrhea or constipation. No melena or hematochezia.  GENITOURINARY: No dysuria or hematuria, urinary frequency  NEUROLOGICAL: No headaches, memory loss, loss of strength, numbness, or tremors  SKIN: No itching, burning, rashes, or lesions     MEDICATIONS  (STANDING):  aspirin enteric coated 81 milliGRAM(s) Oral daily  atorvastatin 40 milliGRAM(s) Oral at bedtime  heparin   Injectable 5000 Unit(s) SubCutaneous every 8 hours  insulin glargine Injectable (LANTUS) 30 Unit(s) SubCutaneous at bedtime  insulin lispro (ADMELOG) corrective regimen sliding scale   SubCutaneous three times a day before meals  insulin lispro Injectable (ADMELOG) 10 Unit(s) SubCutaneous three times a day before meals  losartan 25 milliGRAM(s) Oral daily  pantoprazole    Tablet 40 milliGRAM(s) Oral before breakfast  sodium chloride 0.45%. 1000 milliLiter(s) (50 mL/Hr) IV Continuous <Continuous>  tamsulosin 0.4 milliGRAM(s) Oral at bedtime    MEDICATIONS  (PRN):  acetaminophen   Tablet .. 650 milliGRAM(s) Oral every 6 hours PRN Temp greater or equal to 38C (100.4F), Moderate Pain (4 - 6)      Vital Signs Last 24 Hrs  T(C): 37 (13 Jan 2021 11:12), Max: 37.1 (13 Jan 2021 05:32)  T(F): 98.6 (13 Jan 2021 11:12), Max: 98.7 (13 Jan 2021 05:32)  HR: 84 (13 Jan 2021 11:12) (63 - 87)  BP: 100/56 (13 Jan 2021 11:12) (100/56 - 129/75)  BP(mean): --  RR: 16 (13 Jan 2021 11:12) (16 - 16)  SpO2: 96% (13 Jan 2021 11:12) (95% - 100%)    PHYSICAL EXAMINATION:  GENERAL: NAD, thin  male, in NAD, on supplemental 02   HEAD:  Atraumatic, Normocephalic  EYES:  conjunctiva and sclera clear  NECK: Supple, No JVD, Normal thyroid  CHEST/LUNG: Clear to auscultation. Clear to percussion bilaterally; No rales, rhonchi, wheezing, or rubs  HEART: Regular rate and rhythm; No murmurs, rubs, or gallops  ABDOMEN: Soft, Nontender, Nondistended; Bowel sounds present  NERVOUS SYSTEM:  Alert & Oriented X3,no motor or sensory loss   EXTREMITIES:  2+ Peripheral Pulses, No clubbing, cyanosis, or edema  SKIN: warm dry                          14.0   10.29 )-----------( 223      ( 13 Jan 2021 08:25 )             41.4     01-13    143  |  110<H>  |  49<H>  ----------------------------<  228<H>  3.8   |  23  |  1.32<H>    Ca    8.6      13 Jan 2021 08:25    TPro  5.4<L>  /  Alb  2.4<L>  /  TBili  0.9  /  DBili  0.2  /  AST  68<H>  /  ALT  83<H>  /  AlkPhos  50  01-13    LIVER FUNCTIONS - ( 13 Jan 2021 08:25 )  Alb: 2.4 g/dL / Pro: 5.4 g/dL / ALK PHOS: 50 U/L / ALT: 83 U/L DA / AST: 68 U/L / GGT: x           CARDIAC MARKERS ( 12 Jan 2021 07:43 )  x     / x     / 96 U/L / x     / x              CAPILLARY BLOOD GLUCOSE      RADIOLOGY & ADDITIONAL TESTS:

## 2021-01-13 NOTE — PROGRESS NOTE ADULT - PROBLEM SELECTOR PLAN 2
- Presented with respiratory status and chest pain  - He is saturating 95% on 4 L NC  - COVID positive  - f/u inflammatory markers  - On decadron day 5, remdisivir finished full course   - Monitor respiratory status, O2 supplement as needed - Presented with respiratory status and chest pain  - He is saturating 95% on 3L NC  - COVID positive  - f/u inflammatory markers  - C/w decadron, remdisivir finished full course   - Monitor respiratory status, O2 supplement as needed

## 2021-01-13 NOTE — PHYSICAL THERAPY INITIAL EVALUATION ADULT - PRECAUTIONS/LIMITATIONS, REHAB EVAL
COVID + (airbone and contact precautions)/isolation precautions/oxygen therapy device and L/min COVID + (airborne and contact precautions)/isolation precautions/oxygen therapy device and L/min

## 2021-01-13 NOTE — PROGRESS NOTE ADULT - ASSESSMENT
Patient is a 86yo [place] speaking Male with DM, HTN, BPH, HLD p/w abd pain and intermittent chest pain x 7 days. Found to be +COVID-19. Nephrology consulted for Elevated serum creatinine.    1. CHINYERE- mild in the setting of ATN vs at baseline since renal function is stable; Encourage PO intake. UA with small ketones, 100 protein, large blood RBC 10-25. CK 1350 which should not cause CHINYERE. FeNa 0.45%. Renal function stable s/p IVF; likely at baseline.    Ok to c/w ARB for now, would hold if worsening renal function. Strict I/Os. Avoid nephrotoxins/ NSAIDs/ RCA. Monitor BMP.  2. CKD-3a- Spoke to PMD, Dr. Leonie Ch's office; previous SCr 1.3 on 12/24/20. Defer secondary w/u. Avoid nephrotoxins.   3. BPH- c/w flomax  4.  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 Ok to c/w Losartan for now. Monitor BP  5. COVID-19- s/p Remdesivir/ steroids. plan as per primary team

## 2021-01-14 LAB
ALBUMIN SERPL ELPH-MCNC: 2.5 G/DL — LOW (ref 3.5–5)
ALP SERPL-CCNC: 56 U/L — SIGNIFICANT CHANGE UP (ref 40–120)
ALT FLD-CCNC: 74 U/L DA — HIGH (ref 10–60)
ANION GAP SERPL CALC-SCNC: 12 MMOL/L — SIGNIFICANT CHANGE UP (ref 5–17)
AST SERPL-CCNC: 48 U/L — HIGH (ref 10–40)
BILIRUB DIRECT SERPL-MCNC: 0.3 MG/DL — HIGH (ref 0–0.2)
BILIRUB INDIRECT FLD-MCNC: 0.9 MG/DL — SIGNIFICANT CHANGE UP (ref 0.2–1)
BILIRUB SERPL-MCNC: 1.2 MG/DL — SIGNIFICANT CHANGE UP (ref 0.2–1.2)
BUN SERPL-MCNC: 42 MG/DL — HIGH (ref 7–18)
CALCIUM SERPL-MCNC: 9.1 MG/DL — SIGNIFICANT CHANGE UP (ref 8.4–10.5)
CHLORIDE SERPL-SCNC: 109 MMOL/L — HIGH (ref 96–108)
CO2 SERPL-SCNC: 22 MMOL/L — SIGNIFICANT CHANGE UP (ref 22–31)
CREAT SERPL-MCNC: 1.36 MG/DL — HIGH (ref 0.5–1.3)
GLUCOSE BLDC GLUCOMTR-MCNC: 122 MG/DL — HIGH (ref 70–99)
GLUCOSE BLDC GLUCOMTR-MCNC: 156 MG/DL — HIGH (ref 70–99)
GLUCOSE BLDC GLUCOMTR-MCNC: 205 MG/DL — HIGH (ref 70–99)
GLUCOSE BLDC GLUCOMTR-MCNC: 93 MG/DL — SIGNIFICANT CHANGE UP (ref 70–99)
GLUCOSE SERPL-MCNC: 117 MG/DL — HIGH (ref 70–99)
HCT VFR BLD CALC: 44.7 % — SIGNIFICANT CHANGE UP (ref 39–50)
HGB BLD-MCNC: 15 G/DL — SIGNIFICANT CHANGE UP (ref 13–17)
MAGNESIUM SERPL-MCNC: 2.3 MG/DL — SIGNIFICANT CHANGE UP (ref 1.6–2.6)
MCHC RBC-ENTMCNC: 31.1 PG — SIGNIFICANT CHANGE UP (ref 27–34)
MCHC RBC-ENTMCNC: 33.6 GM/DL — SIGNIFICANT CHANGE UP (ref 32–36)
MCV RBC AUTO: 92.7 FL — SIGNIFICANT CHANGE UP (ref 80–100)
NRBC # BLD: 0 /100 WBCS — SIGNIFICANT CHANGE UP (ref 0–0)
PHOSPHATE SERPL-MCNC: 3.3 MG/DL — SIGNIFICANT CHANGE UP (ref 2.5–4.5)
PLATELET # BLD AUTO: 223 K/UL — SIGNIFICANT CHANGE UP (ref 150–400)
POTASSIUM SERPL-MCNC: 4 MMOL/L — SIGNIFICANT CHANGE UP (ref 3.5–5.3)
POTASSIUM SERPL-SCNC: 4 MMOL/L — SIGNIFICANT CHANGE UP (ref 3.5–5.3)
PROT SERPL-MCNC: 5.9 G/DL — LOW (ref 6–8.3)
RBC # BLD: 4.82 M/UL — SIGNIFICANT CHANGE UP (ref 4.2–5.8)
RBC # FLD: 13.4 % — SIGNIFICANT CHANGE UP (ref 10.3–14.5)
SODIUM SERPL-SCNC: 143 MMOL/L — SIGNIFICANT CHANGE UP (ref 135–145)
WBC # BLD: 8.63 K/UL — SIGNIFICANT CHANGE UP (ref 3.8–10.5)
WBC # FLD AUTO: 8.63 K/UL — SIGNIFICANT CHANGE UP (ref 3.8–10.5)

## 2021-01-14 RX ORDER — HEPARIN SODIUM 5000 [USP'U]/ML
5000 INJECTION INTRAVENOUS; SUBCUTANEOUS EVERY 8 HOURS
Refills: 0 | Status: DISCONTINUED | OUTPATIENT
Start: 2021-01-14 | End: 2021-01-20

## 2021-01-14 RX ORDER — INSULIN LISPRO 100/ML
5 VIAL (ML) SUBCUTANEOUS
Refills: 0 | Status: DISCONTINUED | OUTPATIENT
Start: 2021-01-14 | End: 2021-01-14

## 2021-01-14 RX ORDER — INSULIN GLARGINE 100 [IU]/ML
16 INJECTION, SOLUTION SUBCUTANEOUS AT BEDTIME
Refills: 0 | Status: DISCONTINUED | OUTPATIENT
Start: 2021-01-14 | End: 2021-01-16

## 2021-01-14 RX ORDER — INSULIN LISPRO 100/ML
2 VIAL (ML) SUBCUTANEOUS
Refills: 0 | Status: DISCONTINUED | OUTPATIENT
Start: 2021-01-14 | End: 2021-01-20

## 2021-01-14 RX ADMIN — Medication 1: at 11:42

## 2021-01-14 RX ADMIN — PANTOPRAZOLE SODIUM 40 MILLIGRAM(S): 20 TABLET, DELAYED RELEASE ORAL at 06:40

## 2021-01-14 RX ADMIN — INSULIN GLARGINE 16 UNIT(S): 100 INJECTION, SOLUTION SUBCUTANEOUS at 21:02

## 2021-01-14 RX ADMIN — ATORVASTATIN CALCIUM 40 MILLIGRAM(S): 80 TABLET, FILM COATED ORAL at 21:01

## 2021-01-14 RX ADMIN — Medication 5 UNIT(S): at 11:43

## 2021-01-14 RX ADMIN — TAMSULOSIN HYDROCHLORIDE 0.4 MILLIGRAM(S): 0.4 CAPSULE ORAL at 21:01

## 2021-01-14 RX ADMIN — Medication 81 MILLIGRAM(S): at 11:44

## 2021-01-14 RX ADMIN — HEPARIN SODIUM 5000 UNIT(S): 5000 INJECTION INTRAVENOUS; SUBCUTANEOUS at 21:01

## 2021-01-14 RX ADMIN — HEPARIN SODIUM 5000 UNIT(S): 5000 INJECTION INTRAVENOUS; SUBCUTANEOUS at 06:40

## 2021-01-14 RX ADMIN — HEPARIN SODIUM 5000 UNIT(S): 5000 INJECTION INTRAVENOUS; SUBCUTANEOUS at 13:42

## 2021-01-14 NOTE — PROGRESS NOTE ADULT - ASSESSMENT
Patient is a 84yo Malagasy speaking Male with DM, HTN, BPH, HLD p/w abd pain and intermittent chest pain x 7 days. Found to be +COVID-19. Nephrology consulted for Elevated serum creatinine.    1. CHINYERE- mild in the setting of ATN ; renal function at baseline. Encourage PO intake. UA with small ketones, 100 protein, large blood RBC 10-25. CK 1350 which should not cause CHINYERE. FeNa 0.45%.     Strict I/Os. Avoid nephrotoxins/ NSAIDs/ RCA. Monitor BMP.  2. CKD-3a- Spoke to PMD, Dr. Leonie Ch's office; previous SCr 1.3 on 12/24/20. Defer secondary w/u. Avoid nephrotoxins.   3. BPH- c/w flomax  4.  HTN 2/2 CKD- BP low normal; considering holding Losartan. Monitor BP  5. COVID-19- s/p Remdesivir/ steroids. plan as per primary team

## 2021-01-14 NOTE — PROGRESS NOTE ADULT - PROBLEM SELECTOR PLAN 6
- Has h/o DM  - On HSS, lispro and lantus  - Doses adjusted 1/11 as uncontrolled BS's due to dexa - lantus 30 HS, premeal 10U  - Needs diabetic education/ insulin injection education  - Likely needs insulin  on discharge   - Monitor ACHS

## 2021-01-14 NOTE — PROGRESS NOTE ADULT - SUBJECTIVE AND OBJECTIVE BOX
Interval Events:    tolerating po intake  poc glucose controlled  not requiring significant additional doses on ss  Allergies    No Known Allergies    Intolerances      Endocrine/Metabolic Medications:  atorvastatin 40 milliGRAM(s) Oral at bedtime  insulin glargine Injectable (LANTUS) 30 Unit(s) SubCutaneous at bedtime  insulin lispro (ADMELOG) corrective regimen sliding scale   SubCutaneous three times a day before meals  insulin lispro Injectable (ADMELOG) 10 Unit(s) SubCutaneous three times a day before meals      Vital Signs Last 24 Hrs  T(C): 36.7 (14 Jan 2021 04:42), Max: 37 (13 Jan 2021 11:12)  T(F): 98 (14 Jan 2021 04:42), Max: 98.6 (13 Jan 2021 11:12)  HR: 83 (14 Jan 2021 04:42) (71 - 91)  BP: 106/61 (14 Jan 2021 04:42) (92/57 - 106/61)  BP(mean): --  RR: 19 (14 Jan 2021 04:42) (16 - 19)  SpO2: 95% (14 Jan 2021 04:42) (95% - 100%)      PHYSICAL EXAM  Constitutional:    NC/AT:    HEENT:    Neck:  No JVD  Respiratory:  reduced breath sounds b/l bases    Cardiovascular:  RR     Gastrointestinal: Soft     Extremities: without cyanosis    Neurological:  non focal      LABS                        14.0   10.29 )-----------( 223      ( 13 Jan 2021 08:25 )             41.4                               143    |  110    |  49                  Calcium: 8.6   / iCa: x      (01-13 @ 08:25)    ----------------------------<  228       Magnesium: x                                3.8     |  23     |  1.32             Phosphorous: x        TPro  5.4    /  Alb  2.4    /  TBili  0.9    /  DBili  0.2    /  AST  68     /  ALT  83     /  AlkPhos  50     13 Jan 2021 08:25    CAPILLARY BLOOD GLUCOSE      POCT Blood Glucose.: 135 mg/dL (13 Jan 2021 21:20)  POCT Blood Glucose.: 167 mg/dL (13 Jan 2021 17:00)  POCT Blood Glucose.: 226 mg/dL (13 Jan 2021 11:28)        Assesment/plan        84 yo male with multiple comorbidities including h/o DM type 2, HTN, HLD admitted with fatigue, chest pain    endocrinology consulted for glycemic management    DM type 2  uncontrolled  HBA1C 7.7  complicated by uncontrolled hyperglycemia, high dose steroid use, acute COVID 19  home regimen:  actos 45mg daily, glipizide 10mg bid, farxiga, semaglutide 14mg daily  follows with Dr Haldey outpatient    recommendations:  high dose steroids- hyperglycemia  completed dexamethasone on 1/12    - continue insulin lantus 30 units daily  - continue insulin lispro 10 units pre meals  - continue low dose sliding scale  - reassess based on requirements  - goal inpatient glucose range: 140-180mg/dl    tentative discharge regimen:  discussed with patient- may need insulin outpatient depending on doses he requires  he is agreeable to it  stop glipizide 10mg bid  start insulin lantus 16 units daily  continue actos 45mg daily  continue farxiga  continue semaglutide po 14mg daily,     CHINYERE  cautious insulin dosing d/t reduced renal insulin clearance    COVID 19  on dexamethasone  remdseivir  hyperglycemic- adjust insulin regimen    HLD  LDL at goal  on repatha, crestor 20 as outpatient  continue atorvastatin 40mg daily while inpatient      Discussed with primary team  Please call Endocrine- [phone]- Dr Katarina Sandoval as needed

## 2021-01-14 NOTE — PROGRESS NOTE ADULT - PROBLEM SELECTOR PLAN 8
- On heparin for DVT prophylaxis  - PT eval for possible ambulatory dysfunction -> home with home PT

## 2021-01-14 NOTE — PROGRESS NOTE ADULT - PROBLEM SELECTOR PLAN 2
- Presented with respiratory status and chest pain  - He is saturating 95% on 3L NC  - COVID positive  - f/u inflammatory markers  - C/w decadron, remdisivir finished full course   - Monitor respiratory status, O2 supplement as needed - Presented with respiratory status and chest pain  - He is saturating 92% on RA at rest  - COVID positive  - f/u inflammatory markers  - C/w decadron, remdisivir finished full course   - Monitor respiratory status, O2 supplement as needed

## 2021-01-14 NOTE — PROGRESS NOTE ADULT - SUBJECTIVE AND OBJECTIVE BOX
[facility] NEPHROLOGY- PROGRESS NOTE    Patient is a 84yo Tongan speaking Male with DM, HTN, BPH, HLD p/w abd pain and intermittent chest pain x 7 days. Found to be +COVID-19. Nephrology consulted for Elevated serum creatinine.co    REVIEW OF SYSTEMS: Deferred    No Known Allergies      Hospital Medications: Medications reviewed    VITALS:  T(F): 98 (01-14-21 @ 11:03), Max: 98.6 (01-13-21 @ 15:30)  HR: 89 (01-14-21 @ 11:03)  BP: 97/59 (01-14-21 @ 11:03)  RR: 18 (01-14-21 @ 11:03)  SpO2: 90% (01-14-21 @ 11:04)  Wt(kg): --    01-13 @ 07:01  -  01-14 @ 07:00  --------------------------------------------------------  IN: 480 mL / OUT: 500 mL / NET: -20 mL        PHYSICAL EXAM: Defer physical exam due to COVID-19 status. Please refer to primary team's note for physical exam.    LABS:  01-14    143  |  109<H>  |  42<H>  ----------------------------<  117<H>  4.0   |  22  |  1.36<H>    Ca    9.1      14 Jan 2021 07:51  Phos  3.3     01-14  Mg     2.3     01-14    TPro  5.9<L>  /  Alb  2.5<L>  /  TBili  1.2  /  DBili  0.3<H>  /  AST  48<H>  /  ALT  74<H>  /  AlkPhos  56  01-14    Creatinine Trend: 1.36 <--, 1.32 <--, 1.54 <--, 1.57 <--, 1.59 <--, 1.54 <--, 1.52 <--                        15.0   8.63  )-----------( 223      ( 14 Jan 2021 07:51 )             44.7     Urine Studies:  Urinalysis Basic - ( 08 Jan 2021 00:32 )    Color:  / Appearance:  / SG:  / pH:   Gluc:  / Ketone:   / Bili:  / Urobili:    Blood:  / Protein:  / Nitrite:    Leuk Esterase:  / RBC: 10-25 /HPF / WBC 0-2 /HPF   Sq Epi:  / Non Sq Epi: Few /HPF / Bacteria: Few /HPF      Sodium, Random Urine: 56 mmol/L (01-07 @ 20:50)  Osmolality, Random Urine: 816 mos/kg (01-07 @ 20:50)  Creatinine, Random Urine: 148 mg/dL (01-07 @ 20:50)  Chloride, Random Urine: 56 mmol/L (01-07 @ 20:50)

## 2021-01-14 NOTE — PROGRESS NOTE ADULT - SUBJECTIVE AND OBJECTIVE BOX
PGY-1 Progress Note discussed with attending    PAGER #: [[phone]] TILL 5:00 PM  PLEASE CONTACT ON CALL TEAM:  - On Call Team (Please refer to Jag) FROM 5:00 PM - 8:30PM  - Nightfloat Team FROM 8:30 -7:30 AM    CHIEF COMPLAINT & BRIEF HOSPITAL COURSE:    INTERVAL HPI/OVERNIGHT EVENTS:       REVIEW OF SYSTEMS:  CONSTITUTIONAL: No fever, weight loss, or fatigue  RESPIRATORY: No cough, wheezing, chills or hemoptysis; No shortness of breath  CARDIOVASCULAR: No chest pain, palpitations, dizziness, or leg swelling  GASTROINTESTINAL: No abdominal pain. No nausea, vomiting, or hematemesis; No diarrhea or constipation. No melena or hematochezia.  GENITOURINARY: No dysuria or hematuria, urinary frequency  NEUROLOGICAL: No headaches, memory loss, loss of strength, numbness, or tremors  SKIN: No itching, burning, rashes, or lesions     MEDICATIONS  (STANDING):  aspirin enteric coated 81 milliGRAM(s) Oral daily  atorvastatin 40 milliGRAM(s) Oral at bedtime  heparin   Injectable 5000 Unit(s) SubCutaneous every 8 hours  insulin glargine Injectable (LANTUS) 16 Unit(s) SubCutaneous at bedtime  insulin lispro (ADMELOG) corrective regimen sliding scale   SubCutaneous three times a day before meals  insulin lispro Injectable (ADMELOG) 5 Unit(s) SubCutaneous three times a day before meals  losartan 25 milliGRAM(s) Oral daily  pantoprazole    Tablet 40 milliGRAM(s) Oral before breakfast  sodium chloride 0.45%. 1000 milliLiter(s) (50 mL/Hr) IV Continuous <Continuous>  tamsulosin 0.4 milliGRAM(s) Oral at bedtime    MEDICATIONS  (PRN):  acetaminophen   Tablet .. 650 milliGRAM(s) Oral every 6 hours PRN Temp greater or equal to 38C (100.4F), Moderate Pain (4 - 6)      Vital Signs Last 24 Hrs  T(C): 36.7 (14 Jan 2021 11:03), Max: 37 (13 Jan 2021 15:30)  T(F): 98 (14 Jan 2021 11:03), Max: 98.6 (13 Jan 2021 15:30)  HR: 89 (14 Jan 2021 11:03) (71 - 91)  BP: 97/59 (14 Jan 2021 11:03) (92/57 - 106/61)  BP(mean): --  RR: 18 (14 Jan 2021 11:03) (18 - 19)  SpO2: 90% (14 Jan 2021 11:04) (90% - 100%)    PHYSICAL EXAMINATION:  GENERAL: NAD, well built  HEAD:  Atraumatic, Normocephalic  EYES:  conjunctiva and sclera clear  NECK: Supple, No JVD, Normal thyroid  CHEST/LUNG: Clear to auscultation. Clear to percussion bilaterally; No rales, rhonchi, wheezing, or rubs  HEART: Regular rate and rhythm; No murmurs, rubs, or gallops  ABDOMEN: Soft, Nontender, Nondistended; Bowel sounds present  NERVOUS SYSTEM:  Alert & Oriented X3,    EXTREMITIES:  2+ Peripheral Pulses, No clubbing, cyanosis, or edema  SKIN: warm dry                          15.0   8.63  )-----------( 223      ( 14 Jan 2021 07:51 )             44.7     01-14    143  |  109<H>  |  42<H>  ----------------------------<  117<H>  4.0   |  22  |  1.36<H>    Ca    9.1      14 Jan 2021 07:51  Phos  3.3     01-14  Mg     2.3     01-14    TPro  5.9<L>  /  Alb  2.5<L>  /  TBili  1.2  /  DBili  0.3<H>  /  AST  48<H>  /  ALT  74<H>  /  AlkPhos  56  01-14    LIVER FUNCTIONS - ( 14 Jan 2021 07:51 )  Alb: 2.5 g/dL / Pro: 5.9 g/dL / ALK PHOS: 56 U/L / ALT: 74 U/L DA / AST: 48 U/L / GGT: x                   CAPILLARY BLOOD GLUCOSE      RADIOLOGY & ADDITIONAL TESTS:                   PGY-1 Progress Note discussed with attending    PAGER #: [[phone]] TILL 5:00 PM  PLEASE CONTACT ON CALL TEAM:  - On Call Team (Please refer to Jag) FROM 5:00 PM - 8:30PM  - Nightfloat Team FROM 8:30 -7:30 AM    CHIEF COMPLAINT & BRIEF HOSPITAL COURSE:  85 year old male with significant medical history of DM, HLD, HTN, BPH and surgical history of appendectomy presenting to the ED with complaints of abdominal pain and intermittent chest pain for the last 7 days. He had fever 7 days ago, since then he is feeling weak, had two falls, one was 3 days ago and the other was yesterday. He denies LOC, syncope or prodromal symptoms. He says he hit his head at back. Reports that the chest pain is on and off, 5/10, non radiating, feels like pressure and tightness, associated with breathing difficulty. Relates that he has not been able to eat, having loss of appetite, but does feel thirsty. Denies significant weight loss, cough, abdominal pain, nausea, vomiting, headache, visual changes, or any other acute complaints. patient was found to be COVID positive , started on decadron, remdisivir and supportive measures.   US kidney for CHINYERE/ obstruction  deferred due to Ultrasound dept policy.     INTERVAL HPI/OVERNIGHT EVENTS:   [place]  ID: [id number]  Patient was examined while he was sitting on the edge of the bed, Aox3, responding appropriately to questions, in NAD. He has normal bowel and bladder movements, and denies any other acute complaints except for back pain, non radiating, without sciatica. Likely 2/2 from bedbound status vs deconditioning. Tried to get lower lumbar XR, deferred due to covid.  He is saturating 92% on room air at rest and 90% on ambulation at room air.     REVIEW OF SYSTEMS:  CONSTITUTIONAL: No fever, weight loss, or fatigue  RESPIRATORY: Improving Cough, SOB + No wheezing, chills or hemoptysis  CARDIOVASCULAR: No chest pain, palpitations, dizziness, or leg swelling  GASTROINTESTINAL: No abdominal pain. No nausea, vomiting, or hematemesis; No diarrhea or constipation. No melena or hematochezia.  GENITOURINARY: No dysuria or hematuria, urinary frequency  NEUROLOGICAL: No headaches, memory loss, loss of strength, numbness, or tremors  SKIN: No itching, burning, rashes, or lesions       MEDICATIONS  (STANDING):  aspirin enteric coated 81 milliGRAM(s) Oral daily  atorvastatin 40 milliGRAM(s) Oral at bedtime  heparin   Injectable 5000 Unit(s) SubCutaneous every 8 hours  insulin glargine Injectable (LANTUS) 16 Unit(s) SubCutaneous at bedtime  insulin lispro (ADMELOG) corrective regimen sliding scale   SubCutaneous three times a day before meals  insulin lispro Injectable (ADMELOG) 5 Unit(s) SubCutaneous three times a day before meals  losartan 25 milliGRAM(s) Oral daily  pantoprazole    Tablet 40 milliGRAM(s) Oral before breakfast  sodium chloride 0.45%. 1000 milliLiter(s) (50 mL/Hr) IV Continuous <Continuous>  tamsulosin 0.4 milliGRAM(s) Oral at bedtime    MEDICATIONS  (PRN):  acetaminophen   Tablet .. 650 milliGRAM(s) Oral every 6 hours PRN Temp greater or equal to 38C (100.4F), Moderate Pain (4 - 6)      Vital Signs Last 24 Hrs  T(C): 36.7 (14 Jan 2021 11:03), Max: 37 (13 Jan 2021 15:30)  T(F): 98 (14 Jan 2021 11:03), Max: 98.6 (13 Jan 2021 15:30)  HR: 89 (14 Jan 2021 11:03) (71 - 91)  BP: 97/59 (14 Jan 2021 11:03) (92/57 - 106/61)  BP(mean): --  RR: 18 (14 Jan 2021 11:03) (18 - 19)  SpO2: 90% (14 Jan 2021 11:04) (90% - 100%)    PHYSICAL EXAMINATION:  GENERAL: NAD, thin  male, in NAD, on supplemental 02   HEAD:  Atraumatic, Normocephalic  EYES:  conjunctiva and sclera clear  NECK: Supple, No JVD, Normal thyroid  CHEST/LUNG: Clear to auscultation. Clear to percussion bilaterally; No rales, rhonchi, wheezing, or rubs  HEART: Regular rate and rhythm; No murmurs, rubs, or gallops  ABDOMEN: Soft, Nontender, Nondistended; Bowel sounds present  NERVOUS SYSTEM:  Alert & Oriented X3,no motor or sensory loss   EXTREMITIES:  2+ Peripheral Pulses, No clubbing, cyanosis, or edema  SKIN: warm dry                            15.0   8.63  )-----------( 223      ( 14 Jan 2021 07:51 )             44.7     01-14    143  |  109<H>  |  42<H>  ----------------------------<  117<H>  4.0   |  22  |  1.36<H>    Ca    9.1      14 Jan 2021 07:51  Phos  3.3     01-14  Mg     2.3     01-14    TPro  5.9<L>  /  Alb  2.5<L>  /  TBili  1.2  /  DBili  0.3<H>  /  AST  48<H>  /  ALT  74<H>  /  AlkPhos  56  01-14    LIVER FUNCTIONS - ( 14 Jan 2021 07:51 )  Alb: 2.5 g/dL / Pro: 5.9 g/dL / ALK PHOS: 56 U/L / ALT: 74 U/L DA / AST: 48 U/L / GGT: x                   CAPILLARY BLOOD GLUCOSE      RADIOLOGY & ADDITIONAL TESTS:

## 2021-01-15 DIAGNOSIS — Z02.9 ENCOUNTER FOR ADMINISTRATIVE EXAMINATIONS, UNSPECIFIED: ICD-10-CM

## 2021-01-15 LAB
ALBUMIN SERPL ELPH-MCNC: 2.4 G/DL — LOW (ref 3.5–5)
ALP SERPL-CCNC: 53 U/L — SIGNIFICANT CHANGE UP (ref 40–120)
ALT FLD-CCNC: 56 U/L DA — SIGNIFICANT CHANGE UP (ref 10–60)
ANION GAP SERPL CALC-SCNC: 9 MMOL/L — SIGNIFICANT CHANGE UP (ref 5–17)
AST SERPL-CCNC: 35 U/L — SIGNIFICANT CHANGE UP (ref 10–40)
BILIRUB SERPL-MCNC: 1.7 MG/DL — HIGH (ref 0.2–1.2)
BUN SERPL-MCNC: 31 MG/DL — HIGH (ref 7–18)
CALCIUM SERPL-MCNC: 8.8 MG/DL — SIGNIFICANT CHANGE UP (ref 8.4–10.5)
CHLORIDE SERPL-SCNC: 107 MMOL/L — SIGNIFICANT CHANGE UP (ref 96–108)
CO2 SERPL-SCNC: 24 MMOL/L — SIGNIFICANT CHANGE UP (ref 22–31)
CREAT SERPL-MCNC: 1.28 MG/DL — SIGNIFICANT CHANGE UP (ref 0.5–1.3)
GLUCOSE BLDC GLUCOMTR-MCNC: 106 MG/DL — HIGH (ref 70–99)
GLUCOSE BLDC GLUCOMTR-MCNC: 133 MG/DL — HIGH (ref 70–99)
GLUCOSE BLDC GLUCOMTR-MCNC: 185 MG/DL — HIGH (ref 70–99)
GLUCOSE BLDC GLUCOMTR-MCNC: 94 MG/DL — SIGNIFICANT CHANGE UP (ref 70–99)
GLUCOSE SERPL-MCNC: 111 MG/DL — HIGH (ref 70–99)
HCT VFR BLD CALC: 42 % — SIGNIFICANT CHANGE UP (ref 39–50)
HGB BLD-MCNC: 14.4 G/DL — SIGNIFICANT CHANGE UP (ref 13–17)
MCHC RBC-ENTMCNC: 31.5 PG — SIGNIFICANT CHANGE UP (ref 27–34)
MCHC RBC-ENTMCNC: 34.3 GM/DL — SIGNIFICANT CHANGE UP (ref 32–36)
MCV RBC AUTO: 91.9 FL — SIGNIFICANT CHANGE UP (ref 80–100)
NRBC # BLD: 0 /100 WBCS — SIGNIFICANT CHANGE UP (ref 0–0)
PLATELET # BLD AUTO: 199 K/UL — SIGNIFICANT CHANGE UP (ref 150–400)
POTASSIUM SERPL-MCNC: 3.8 MMOL/L — SIGNIFICANT CHANGE UP (ref 3.5–5.3)
POTASSIUM SERPL-SCNC: 3.8 MMOL/L — SIGNIFICANT CHANGE UP (ref 3.5–5.3)
PROT SERPL-MCNC: 5.7 G/DL — LOW (ref 6–8.3)
RBC # BLD: 4.57 M/UL — SIGNIFICANT CHANGE UP (ref 4.2–5.8)
RBC # FLD: 13.4 % — SIGNIFICANT CHANGE UP (ref 10.3–14.5)
SARS-COV-2 RNA SPEC QL NAA+PROBE: DETECTED
SODIUM SERPL-SCNC: 140 MMOL/L — SIGNIFICANT CHANGE UP (ref 135–145)
WBC # BLD: 7.89 K/UL — SIGNIFICANT CHANGE UP (ref 3.8–10.5)
WBC # FLD AUTO: 7.89 K/UL — SIGNIFICANT CHANGE UP (ref 3.8–10.5)

## 2021-01-15 RX ORDER — EVOLOCUMAB 140 MG/ML
1 INJECTION, SOLUTION SUBCUTANEOUS
Qty: 0 | Refills: 0 | DISCHARGE

## 2021-01-15 RX ORDER — ROSUVASTATIN CALCIUM 5 MG/1
1 TABLET ORAL
Qty: 0 | Refills: 0 | DISCHARGE

## 2021-01-15 RX ORDER — ATORVASTATIN CALCIUM 80 MG/1
1 TABLET, FILM COATED ORAL
Qty: 0 | Refills: 0 | DISCHARGE
Start: 2021-01-15

## 2021-01-15 RX ORDER — INSULIN GLARGINE 100 [IU]/ML
16 INJECTION, SOLUTION SUBCUTANEOUS
Qty: 0 | Refills: 0 | DISCHARGE
Start: 2021-01-15

## 2021-01-15 RX ORDER — EVOLOCUMAB 140 MG/ML
1 INJECTION, SOLUTION SUBCUTANEOUS
Qty: 1 | Refills: 0
Start: 2021-01-15 | End: 2021-02-13

## 2021-01-15 RX ORDER — INSULIN LISPRO 100/ML
VIAL (ML) SUBCUTANEOUS
Refills: 0 | Status: DISCONTINUED | OUTPATIENT
Start: 2021-01-15 | End: 2021-01-20

## 2021-01-15 RX ADMIN — HEPARIN SODIUM 5000 UNIT(S): 5000 INJECTION INTRAVENOUS; SUBCUTANEOUS at 06:06

## 2021-01-15 RX ADMIN — Medication 2 UNIT(S): at 08:17

## 2021-01-15 RX ADMIN — Medication 2 UNIT(S): at 17:37

## 2021-01-15 RX ADMIN — Medication 81 MILLIGRAM(S): at 11:35

## 2021-01-15 RX ADMIN — HEPARIN SODIUM 5000 UNIT(S): 5000 INJECTION INTRAVENOUS; SUBCUTANEOUS at 14:00

## 2021-01-15 RX ADMIN — TAMSULOSIN HYDROCHLORIDE 0.4 MILLIGRAM(S): 0.4 CAPSULE ORAL at 21:56

## 2021-01-15 RX ADMIN — HEPARIN SODIUM 5000 UNIT(S): 5000 INJECTION INTRAVENOUS; SUBCUTANEOUS at 21:56

## 2021-01-15 RX ADMIN — Medication 2: at 17:37

## 2021-01-15 RX ADMIN — ATORVASTATIN CALCIUM 40 MILLIGRAM(S): 80 TABLET, FILM COATED ORAL at 21:56

## 2021-01-15 RX ADMIN — PANTOPRAZOLE SODIUM 40 MILLIGRAM(S): 20 TABLET, DELAYED RELEASE ORAL at 06:06

## 2021-01-15 RX ADMIN — INSULIN GLARGINE 16 UNIT(S): 100 INJECTION, SOLUTION SUBCUTANEOUS at 21:57

## 2021-01-15 NOTE — PROGRESS NOTE ADULT - SUBJECTIVE AND OBJECTIVE BOX
Interval Events:    tolerating po intake  off dexamethasone  receiving reduced basal/bolus doses    Allergies    No Known Allergies    Intolerances      Endocrine/Metabolic Medications:  atorvastatin 40 milliGRAM(s) Oral at bedtime  insulin glargine Injectable (LANTUS) 16 Unit(s) SubCutaneous at bedtime  insulin lispro (ADMELOG) corrective regimen sliding scale   SubCutaneous three times a day before meals  insulin lispro Injectable (ADMELOG) 2 Unit(s) SubCutaneous three times a day before meals      Vital Signs Last 24 Hrs  T(C): 36.7 (15 Shyam 2021 04:43), Max: 36.8 (14 Jan 2021 19:53)  T(F): 98 (15 Shyam 2021 04:43), Max: 98.3 (14 Jan 2021 19:53)  HR: 84 (15 Shyam 2021 04:43) (79 - 91)  BP: 103/61 (15 Shyam 2021 04:43) (93/54 - 107/60)  BP(mean): --  RR: 18 (15 Shyam 2021 04:43) (18 - 19)  SpO2: 96% (15 Shyam 2021 04:43) (90% - 98%)      PHYSICAL EXAM  Constitutional:    NC/AT:    HEENT:    Neck:  No JVD  Respiratory:  reduced breath sounds b/l bases    Cardiovascular:  RR     Gastrointestinal: Soft     Extremities: without cyanosis    Neurological:  non focal    LABS                        14.4   7.89  )-----------( 199      ( 15 Shyam 2021 07:02 )             42.0                               143    |  109    |  42                  Calcium: 9.1   / iCa: x      (01-14 @ 07:51)    ----------------------------<  117       Magnesium: 2.3                              4.0     |  22     |  1.36             Phosphorous: 3.3      TPro  5.9    /  Alb  2.5    /  TBili  1.2    /  DBili  0.3    /  AST  48     /  ALT  74     /  AlkPhos  56     14 Jan 2021 07:51    CAPILLARY BLOOD GLUCOSE      POCT Blood Glucose.: 205 mg/dL (14 Jan 2021 20:59)  POCT Blood Glucose.: 93 mg/dL (14 Jan 2021 16:58)  POCT Blood Glucose.: 156 mg/dL (14 Jan 2021 11:28)  POCT Blood Glucose.: 122 mg/dL (14 Jan 2021 07:57)        Assesment/plan          86 yo male with multiple comorbidities including h/o DM type 2, HTN, HLD admitted with fatigue, chest pain    endocrinology consulted for glycemic management    DM type 2  uncontrolled  HBA1C 7.7  complicated by uncontrolled hyperglycemia, high dose steroid use, acute COVID 19  home regimen:  actos 45mg daily, glipizide 10mg bid, farxiga, semaglutide 14mg daily  follows with Dr Hadley outpatient    recommendations:  completed dexamethasone on 1/12    - agree with insulin lantus 16 units daily  - can continue insulin lispro 2 units pre meals  - increase to moderate dose sliding scale  - reassess based on requirements  - goal inpatient glucose range: 140-180mg/dl    tentative discharge regimen:  discussed with patient- may need insulin outpatient depending on doses he requires  he is agreeable to it  stop glipizide 10mg bid- high risk of prolonged hypoglycemia   start insulin lantus 16 units daily  continue actos 45mg daily  continue farxiga  continue semaglutide po 14mg daily,     CHINYERE  cautious insulin dosing d/t reduced renal insulin clearance    COVID 19  on dexamethasone  remdesivir  hyperglycemic- adjust insulin regimen    HLD  LDL at goal  on repatha, crestor 20 as outpatient  continue atorvastatin 40mg daily while inpatient      Discussed with primary team  Please call Endocrine- [phone]- Dr Katarina Sandoval as needed

## 2021-01-15 NOTE — PROGRESS NOTE ADULT - PROBLEM SELECTOR PLAN 2
- Presented with respiratory status and chest pain  - He is saturating 95% on RA at rest  - COVID positive  - f/u inflammatory markers  - C/w decadron, remdisivir finished full course   - Monitor respiratory status, O2 supplement as needed

## 2021-01-15 NOTE — PROGRESS NOTE ADULT - SUBJECTIVE AND OBJECTIVE BOX
writer had extensive lengthy discussion with pts son about pts current clinical status , management plan , ali questions answered   pts son agreed for pt to be transferred to [facility]

## 2021-01-15 NOTE — PROGRESS NOTE ADULT - ASSESSMENT
Patient is a 86yo French speaking Male with DM, HTN, BPH, HLD p/w abd pain and intermittent chest pain x 7 days. Found to be +COVID-19. Nephrology consulted for Elevated serum creatinine.    1. CHINYERE- mild in the setting of ATN ; renal function at baseline. Encourage PO intake. UA with small ketones, 100 protein, large blood RBC 10-25. CK 1350 which should not cause CHINYERE. FeNa 0.45%.     Strict I/Os. Avoid nephrotoxins/ NSAIDs/ RCA. Monitor BMP.  2. CKD-3a- Spoke to PMD, Dr. Leonie Ch's office; previous SCr 1.3 on 12/24/20. Defer secondary w/u. Avoid nephrotoxins.   3. BPH- c/w flomax  4.  HTN 2/2 CKD- BP low normal; considering holding Losartan. Monitor BP  5. COVID-19- s/p Remdesivir/ steroids. plan as per primary team

## 2021-01-15 NOTE — PROGRESS NOTE ADULT - SUBJECTIVE AND OBJECTIVE BOX
PGY-1 Progress Note discussed with attending    PAGER #: [[phone]] TILL 5:00 PM  PLEASE CONTACT ON CALL TEAM:  - On Call Team (Please refer to Jag) FROM 5:00 PM - 8:30PM  - Nightfloat Team FROM 8:30 -7:30 AM    CHIEF COMPLAINT & BRIEF HOSPITAL COURSE:  85 year old male with significant medical history of DM, HLD, HTN, BPH and surgical history of appendectomy presenting to the ED with complaints of abdominal pain and intermittent chest pain for the last 7 days. He had fever 7 days ago, since then he is feeling weak, had two falls, one was 3 days ago and the other was yesterday. He denies LOC, syncope or prodromal symptoms. He says he hit his head at back. Reports that the chest pain is on and off, 5/10, non radiating, feels like pressure and tightness, associated with breathing difficulty. Relates that he has not been able to eat, having loss of appetite, but does feel thirsty. Denies significant weight loss, cough, abdominal pain, nausea, vomiting, headache, visual changes, or any other acute complaints. Patient was found to be COVID positive , started on decadron, remdisivir and supportive measures. US kidney for CHINYERE/ obstruction deferred due to Ultrasound dept policy. Patient symptomatically improved and was taken off oxygen 1/14, saturating well on RA. he was re-evaluated by PT and found to have some deficits for which he could use PT.     INTERVAL HPI/OVERNIGHT EVENTS:   Patient was examined while he was lying in bed, Aox3, responding appropriately to questions, in NAD. He has normal bowel and bladder movements, and denies any other acute complaints. He is off supplemental 02, and saturating well.     REVIEW OF SYSTEMS:  CONSTITUTIONAL: No fever, weight loss, or fatigue  RESPIRATORY: Improving Cough, No wheezing, chills or hemoptysis  CARDIOVASCULAR: No chest pain, palpitations, dizziness, or leg swelling  GASTROINTESTINAL: No abdominal pain. No nausea, vomiting, or hematemesis; No diarrhea or constipation. No melena or hematochezia.  GENITOURINARY: No dysuria or hematuria, urinary frequency  NEUROLOGICAL: No headaches, memory loss, loss of strength, numbness, or tremors  SKIN: No itching, burning, rashes, or lesions       MEDICATIONS  (STANDING):  aspirin enteric coated 81 milliGRAM(s) Oral daily  atorvastatin 40 milliGRAM(s) Oral at bedtime  heparin   Injectable 5000 Unit(s) SubCutaneous every 8 hours  insulin glargine Injectable (LANTUS) 16 Unit(s) SubCutaneous at bedtime  insulin lispro (ADMELOG) corrective regimen sliding scale   SubCutaneous three times a day before meals  insulin lispro Injectable (ADMELOG) 2 Unit(s) SubCutaneous three times a day before meals  losartan 25 milliGRAM(s) Oral daily  pantoprazole    Tablet 40 milliGRAM(s) Oral before breakfast  sodium chloride 0.45%. 1000 milliLiter(s) (50 mL/Hr) IV Continuous <Continuous>  tamsulosin 0.4 milliGRAM(s) Oral at bedtime    MEDICATIONS  (PRN):  acetaminophen   Tablet .. 650 milliGRAM(s) Oral every 6 hours PRN Temp greater or equal to 38C (100.4F), Moderate Pain (4 - 6)      Vital Signs Last 24 Hrs  T(C): 36.9 (15 Shyam 2021 11:11), Max: 36.9 (15 Shyam 2021 07:58)  T(F): 98.4 (15 Shyam 2021 11:11), Max: 98.4 (15 Shyam 2021 07:58)  HR: 78 (15 Shyam 2021 14:40) (78 - 91)  BP: 110/58 (15 Shyam 2021 14:40) (93/54 - 114/53)  BP(mean): --  RR: 18 (15 Shyam 2021 11:11) (18 - 19)  SpO2: 95% (15 Shyam 2021 14:40) (95% - 98%)    PHYSICAL EXAMINATION:  GENERAL: NAD, thin  male, in NAD, on RA   HEAD:  Atraumatic, Normocephalic  EYES:  conjunctiva and sclera clear  NECK: Supple, No JVD, Normal thyroid  CHEST/LUNG: Clear to auscultation. Clear to percussion bilaterally; No rales, rhonchi, wheezing, or rubs  HEART: Regular rate and rhythm; No murmurs, rubs, or gallops  ABDOMEN: Soft, Nontender, Nondistended; Bowel sounds present  NERVOUS SYSTEM:  Alert & Oriented X3,no motor or sensory loss   EXTREMITIES:  2+ Peripheral Pulses, No clubbing, cyanosis, or edema  SKIN: warm dry                          14.4   7.89  )-----------( 199      ( 15 Shyam 2021 07:02 )             42.0     01-15    140  |  107  |  31<H>  ----------------------------<  111<H>  3.8   |  24  |  1.28    Ca    8.8      15 Shyam 2021 07:02  Phos  3.3     01-14  Mg     2.3     01-14    TPro  5.7<L>  /  Alb  2.4<L>  /  TBili  1.7<H>  /  DBili  x   /  AST  35  /  ALT  56  /  AlkPhos  53  01-15    LIVER FUNCTIONS - ( 15 Shyam 2021 07:02 )  Alb: 2.4 g/dL / Pro: 5.7 g/dL / ALK PHOS: 53 U/L / ALT: 56 U/L DA / AST: 35 U/L / GGT: x                   CAPILLARY BLOOD GLUCOSE      RADIOLOGY & ADDITIONAL TESTS:

## 2021-01-15 NOTE — PROGRESS NOTE ADULT - PROBLEM SELECTOR PLAN 8
- PT eval for possible ambulatory dysfunction -> home with home PT   - May need LAURIE on discharge as covid pos vs home with HHA

## 2021-01-15 NOTE — PROGRESS NOTE ADULT - PROBLEM SELECTOR PLAN 6
- Has h/o DM  - On HSS, lispro and lantus  - Doses adjusted 1/11 as uncontrolled BS's due to dexa - lantus 30 HS, premeal 10U  - Received diabetic education/ insulin injection education  - Discharge recs for insulin noted - continue farxiga, actos, semaglutide, lantus 16U qd   - Monitor ACHS  - Endo Dr. Sandoval

## 2021-01-15 NOTE — PROGRESS NOTE ADULT - SUBJECTIVE AND OBJECTIVE BOX
[facility] NEPHROLOGY- PROGRESS NOTE    Patient is a 84yo Citizen of the [place] speaking Male with DM, HTN, BPH, HLD p/w abd pain and intermittent chest pain x 7 days. Found to be +COVID-19. Nephrology consulted for Elevated serum creatinine.co    REVIEW OF SYSTEMS:  Gen: no changes in weight  Cards: no chest pain  Resp: + dyspnea resolved  GI: no nausea or vomiting or diarrhea  Vascular: no LE edema      No Known Allergies      Hospital Medications: Medications reviewed    VITALS:  T(F): 98 (01-14-21 @ 11:03), Max: 98.6 (01-13-21 @ 15:30)  HR: 89 (01-14-21 @ 11:03)  BP: 97/59 (01-14-21 @ 11:03)  RR: 18 (01-14-21 @ 11:03)  SpO2: 90% (01-14-21 @ 11:04)  Wt(kg): --    01-13 @ 07:01  -  01-14 @ 07:00  --------------------------------------------------------  IN: 480 mL / OUT: 500 mL / NET: -20 mL      LABS:  01-15    140  |  107  |  31<H>  ----------------------------<  111<H>  3.8   |  24  |  1.28    Ca    8.8      15 Shyam 2021 07:02  Phos  3.3     01-14  Mg     2.3     01-14    TPro  5.7<L>  /  Alb  2.4<L>  /  TBili  1.7<H>  /  DBili      /  AST  35  /  ALT  56  /  AlkPhos  53  01-15    Creatinine Trend: 1.28 <--, 1.36 <--, 1.32 <--, 1.54 <--, 1.57 <--, 1.59 <--, 1.54 <--                        14.4   7.89  )-----------( 199      ( 15 Shyam 2021 07:02 )             42.0     Urine Studies:        PHYSICAL EXAM:  Gen: NAD, calm  Cards: RRR, +S1/S2, no M/G/R  Resp: CTA ant  GI: soft, NT/ND, NABS  Vascular: no LE edema B/L

## 2021-01-16 LAB
ALBUMIN SERPL ELPH-MCNC: 2.2 G/DL — LOW (ref 3.5–5)
ALP SERPL-CCNC: 53 U/L — SIGNIFICANT CHANGE UP (ref 40–120)
ALT FLD-CCNC: 47 U/L DA — SIGNIFICANT CHANGE UP (ref 10–60)
ANION GAP SERPL CALC-SCNC: 9 MMOL/L — SIGNIFICANT CHANGE UP (ref 5–17)
AST SERPL-CCNC: 30 U/L — SIGNIFICANT CHANGE UP (ref 10–40)
BILIRUB SERPL-MCNC: 1.5 MG/DL — HIGH (ref 0.2–1.2)
BUN SERPL-MCNC: 28 MG/DL — HIGH (ref 7–18)
CALCIUM SERPL-MCNC: 8.9 MG/DL — SIGNIFICANT CHANGE UP (ref 8.4–10.5)
CHLORIDE SERPL-SCNC: 104 MMOL/L — SIGNIFICANT CHANGE UP (ref 96–108)
CO2 SERPL-SCNC: 27 MMOL/L — SIGNIFICANT CHANGE UP (ref 22–31)
CREAT SERPL-MCNC: 1.37 MG/DL — HIGH (ref 0.5–1.3)
GLUCOSE BLDC GLUCOMTR-MCNC: 108 MG/DL — HIGH (ref 70–99)
GLUCOSE BLDC GLUCOMTR-MCNC: 166 MG/DL — HIGH (ref 70–99)
GLUCOSE BLDC GLUCOMTR-MCNC: 177 MG/DL — HIGH (ref 70–99)
GLUCOSE BLDC GLUCOMTR-MCNC: 78 MG/DL — SIGNIFICANT CHANGE UP (ref 70–99)
GLUCOSE SERPL-MCNC: 73 MG/DL — SIGNIFICANT CHANGE UP (ref 70–99)
HCT VFR BLD CALC: 43.4 % — SIGNIFICANT CHANGE UP (ref 39–50)
HGB BLD-MCNC: 14.3 G/DL — SIGNIFICANT CHANGE UP (ref 13–17)
MCHC RBC-ENTMCNC: 30.8 PG — SIGNIFICANT CHANGE UP (ref 27–34)
MCHC RBC-ENTMCNC: 32.9 GM/DL — SIGNIFICANT CHANGE UP (ref 32–36)
MCV RBC AUTO: 93.3 FL — SIGNIFICANT CHANGE UP (ref 80–100)
NRBC # BLD: 0 /100 WBCS — SIGNIFICANT CHANGE UP (ref 0–0)
PLATELET # BLD AUTO: 209 K/UL — SIGNIFICANT CHANGE UP (ref 150–400)
POTASSIUM SERPL-MCNC: 4.2 MMOL/L — SIGNIFICANT CHANGE UP (ref 3.5–5.3)
POTASSIUM SERPL-SCNC: 4.2 MMOL/L — SIGNIFICANT CHANGE UP (ref 3.5–5.3)
PROT SERPL-MCNC: 5.8 G/DL — LOW (ref 6–8.3)
RBC # BLD: 4.65 M/UL — SIGNIFICANT CHANGE UP (ref 4.2–5.8)
RBC # FLD: 13.3 % — SIGNIFICANT CHANGE UP (ref 10.3–14.5)
SODIUM SERPL-SCNC: 140 MMOL/L — SIGNIFICANT CHANGE UP (ref 135–145)
WBC # BLD: 7.44 K/UL — SIGNIFICANT CHANGE UP (ref 3.8–10.5)
WBC # FLD AUTO: 7.44 K/UL — SIGNIFICANT CHANGE UP (ref 3.8–10.5)

## 2021-01-16 RX ORDER — INSULIN GLARGINE 100 [IU]/ML
12 INJECTION, SOLUTION SUBCUTANEOUS AT BEDTIME
Refills: 0 | Status: DISCONTINUED | OUTPATIENT
Start: 2021-01-16 | End: 2021-01-20

## 2021-01-16 RX ADMIN — ATORVASTATIN CALCIUM 40 MILLIGRAM(S): 80 TABLET, FILM COATED ORAL at 20:35

## 2021-01-16 RX ADMIN — PANTOPRAZOLE SODIUM 40 MILLIGRAM(S): 20 TABLET, DELAYED RELEASE ORAL at 05:28

## 2021-01-16 RX ADMIN — HEPARIN SODIUM 5000 UNIT(S): 5000 INJECTION INTRAVENOUS; SUBCUTANEOUS at 20:35

## 2021-01-16 RX ADMIN — INSULIN GLARGINE 12 UNIT(S): 100 INJECTION, SOLUTION SUBCUTANEOUS at 21:08

## 2021-01-16 RX ADMIN — Medication 2 UNIT(S): at 16:18

## 2021-01-16 RX ADMIN — HEPARIN SODIUM 5000 UNIT(S): 5000 INJECTION INTRAVENOUS; SUBCUTANEOUS at 05:28

## 2021-01-16 RX ADMIN — TAMSULOSIN HYDROCHLORIDE 0.4 MILLIGRAM(S): 0.4 CAPSULE ORAL at 20:35

## 2021-01-16 RX ADMIN — Medication 2: at 16:16

## 2021-01-16 RX ADMIN — Medication 2: at 12:45

## 2021-01-16 RX ADMIN — HEPARIN SODIUM 5000 UNIT(S): 5000 INJECTION INTRAVENOUS; SUBCUTANEOUS at 12:46

## 2021-01-16 RX ADMIN — Medication 81 MILLIGRAM(S): at 12:45

## 2021-01-16 RX ADMIN — Medication 2 UNIT(S): at 12:45

## 2021-01-16 NOTE — PROGRESS NOTE ADULT - SUBJECTIVE AND OBJECTIVE BOX
SUBJECTIVE / OVERNIGHT EVENTS: pt seen and examined      MEDICATIONS  (STANDING):  aspirin enteric coated 81 milliGRAM(s) Oral daily  atorvastatin 40 milliGRAM(s) Oral at bedtime  heparin   Injectable 5000 Unit(s) SubCutaneous every 8 hours  insulin glargine Injectable (LANTUS) 12 Unit(s) SubCutaneous at bedtime  insulin lispro (ADMELOG) corrective regimen sliding scale   SubCutaneous three times a day before meals  insulin lispro Injectable (ADMELOG) 2 Unit(s) SubCutaneous three times a day before meals  losartan 25 milliGRAM(s) Oral daily  pantoprazole    Tablet 40 milliGRAM(s) Oral before breakfast  sodium chloride 0.45%. 1000 milliLiter(s) (50 mL/Hr) IV Continuous <Continuous>  tamsulosin 0.4 milliGRAM(s) Oral at bedtime    MEDICATIONS  (PRN):  acetaminophen   Tablet .. 650 milliGRAM(s) Oral every 6 hours PRN Temp greater or equal to 38C (100.4F), Moderate Pain (4 - 6)      Vital Signs Last 24 Hrs  T(C): 36.8 (16 Jan 2021 16:58), Max: 36.9 (15 Shaym 2021 20:11)  T(F): 98.3 (16 Jan 2021 16:58), Max: 98.4 (15 Shyam 2021 20:11)  HR: 94 (16 Jan 2021 16:58) (64 - 99)  BP: 111/67 (16 Jan 2021 16:58) (101/60 - 113/69)  BP(mean): --  RR: 17 (16 Jan 2021 16:58) (17 - 19)  SpO2: 95% (16 Jan 2021 16:58) (94% - 96%)  CAPILLARY BLOOD GLUCOSE      POCT Blood Glucose.: 166 mg/dL (16 Jan 2021 15:40)  POCT Blood Glucose.: 177 mg/dL (16 Jan 2021 12:00)  POCT Blood Glucose.: 78 mg/dL (16 Jan 2021 07:52)  POCT Blood Glucose.: 133 mg/dL (15 Shyam 2021 21:47)    I&O's Summary    15 Shyam 2021 07:01  -  16 Jan 2021 07:00  --------------------------------------------------------  IN: 260 mL / OUT: 500 mL / NET: -240 mL        Constitutional: No fever, fatigue  Skin: No rash.  Eyes: No recent vision problems or eye pain.  ENT: No congestion, ear pain, or sore throat.  Cardiovascular: No chest pain or palpation.  Respiratory: No cough, shortness of breath, congestion, or wheezing.  Gastrointestinal: No abdominal pain, nausea, vomiting, or diarrhea.  Genitourinary: No dysuria.  Musculoskeletal: No joint swelling.  Neurologic: No headache.    PHYSICAL EXAM:  GENERAL: NAD  EYES: EOMI, PERRLA  NECK: Supple, No JVD  CHEST/LUNG: crackles at bases , no rhonchi  HEART:  S1 , S2 +  ABDOMEN: soft , bs+  EXTREMITIES:  no edema  NEUROLOGY:alert awake calm coorative      LABS:                        14.3   7.44  )-----------( 209      ( 16 Jan 2021 06:52 )             43.4     01-16    140  |  104  |  28<H>  ----------------------------<  73  4.2   |  27  |  1.37<H>    Ca    8.9      16 Jan 2021 06:52    TPro  5.8<L>  /  Alb  2.2<L>  /  TBili  1.5<H>  /  DBili  x   /  AST  30  /  ALT  47  /  AlkPhos  53  01-16              RADIOLOGY & ADDITIONAL TESTS:    Imaging Personally Reviewed:    Consultant(s) Notes Reviewed:      Care Discussed with Consultants/Other Providers:

## 2021-01-16 NOTE — PROGRESS NOTE ADULT - PROBLEM SELECTOR PLAN 6
- Has h/o DM  - On HSS, lispro and lantus  - Doses adjusted 1/11 as uncontrolled BS's due to dexa - lantus 30 HS, premeal 10U  - Received diabetic education/ insulin injection education  - Discharge recs for insulin noted - continue farxiga, actos, semaglutide, lantus 16U qd   - Monitor ACHS  - Endo f/u  since off dex , monitor fsbg closely

## 2021-01-16 NOTE — PROGRESS NOTE ADULT - ASSESSMENT
Patient is a 84yo [place] speaking Male with DM, HTN, BPH, HLD p/w abd pain and intermittent chest pain x 7 days. Found to be +COVID-19. Nephrology consulted for Elevated serum creatinine.    1. CHINYERE- mild in the setting of ATN ; renal function at baseline. Encourage PO intake. UA with small ketones, 100 protein, large blood RBC 10-25. CK 1350 which should not cause CHINYERE. FeNa 0.45%.     Strict I/Os. Avoid nephrotoxins/ NSAIDs/ RCA. Monitor BMP.  2. CKD-3a- Dr. Mederos Spoke to PMD, Dr. Leonie Ch's office; previous SCr 1.3 on 12/24/20. Defer secondary w/u. Avoid nephrotoxins.   3. BPH- c/w flomax  4.  HTN 2/2 CKD- BP low normal; considering holding Losartan. Monitor BP  5. COVID-19- s/p Remdesivir/ steroids. plan as per primary team

## 2021-01-16 NOTE — PROGRESS NOTE ADULT - SUBJECTIVE AND OBJECTIVE BOX
Time of Visit:  Patient seen and examined.     MEDICATIONS  (STANDING):  aspirin enteric coated 81 milliGRAM(s) Oral daily  atorvastatin 40 milliGRAM(s) Oral at bedtime  heparin   Injectable 5000 Unit(s) SubCutaneous every 8 hours  insulin glargine Injectable (LANTUS) 12 Unit(s) SubCutaneous at bedtime  insulin lispro (ADMELOG) corrective regimen sliding scale   SubCutaneous three times a day before meals  insulin lispro Injectable (ADMELOG) 2 Unit(s) SubCutaneous three times a day before meals  losartan 25 milliGRAM(s) Oral daily  pantoprazole    Tablet 40 milliGRAM(s) Oral before breakfast  sodium chloride 0.45%. 1000 milliLiter(s) (50 mL/Hr) IV Continuous <Continuous>  tamsulosin 0.4 milliGRAM(s) Oral at bedtime      MEDICATIONS  (PRN):  acetaminophen   Tablet .. 650 milliGRAM(s) Oral every 6 hours PRN Temp greater or equal to 38C (100.4F), Moderate Pain (4 - 6)       Medications up to date at time of exam.      PHYSICAL EXAMINATION:  Patient has no new complaints.  GENERAL: The patient is a well-developed, well-nourished, in no apparent distress.     Vital Signs Last 24 Hrs  T(C): 36.8 (16 Jan 2021 16:58), Max: 36.9 (15 Shyam 2021 20:11)  T(F): 98.3 (16 Jan 2021 16:58), Max: 98.4 (15 Shyam 2021 20:11)  HR: 94 (16 Jan 2021 16:58) (64 - 99)  BP: 111/67 (16 Jan 2021 16:58) (101/60 - 113/69)  BP(mean): --  RR: 17 (16 Jan 2021 16:58) (17 - 19)  SpO2: 95% (16 Jan 2021 16:58) (94% - 96%)   (if applicable)    Chest Tube (if applicable)    HEENT: Head is normocephalic and atraumatic. Extraocular muscles are intact. Mucous membranes are moist.     NECK: Supple, no palpable adenopathy.    LUNGS: Clear to auscultation, no wheezing, rales, or rhonchi.    HEART: Regular rate and rhythm without murmur.    ABDOMEN: Soft, nontender, and nondistended.  No hepatosplenomegaly is noted.    : No painful voiding, no pelvic pain    EXTREMITIES: Without any cyanosis, clubbing, rash, lesions or edema.    NEUROLOGIC: Awake, alert, oriented, grossly intact    SKIN: Warm, dry, good turgor.      LABS:                        14.3   7.44  )-----------( 209      ( 16 Jan 2021 06:52 )             43.4     01-16    140  |  104  |  28<H>  ----------------------------<  73  4.2   |  27  |  1.37<H>    Ca    8.9      16 Jan 2021 06:52    TPro  5.8<L>  /  Alb  2.2<L>  /  TBili  1.5<H>  /  DBili  x   /  AST  30  /  ALT  47  /  AlkPhos  53  01-16                        MICROBIOLOGY: (if applicable)    RADIOLOGY & ADDITIONAL STUDIES:  EKG:   CXR:  ECHO:    IMPRESSION: 85y Male PAST MEDICAL & SURGICAL HISTORY:  BPH (benign prostatic hyperplasia)    HLD (hyperlipidemia)    DM (diabetes mellitus)    HTN (hypertension)    History of appendectomy     p/w           RECOMMENDATIONS:   Time of Visit:  Patient seen and examined.     MEDICATIONS  (STANDING):  aspirin enteric coated 81 milliGRAM(s) Oral daily  atorvastatin 40 milliGRAM(s) Oral at bedtime  heparin   Injectable 5000 Unit(s) SubCutaneous every 8 hours  insulin glargine Injectable (LANTUS) 12 Unit(s) SubCutaneous at bedtime  insulin lispro (ADMELOG) corrective regimen sliding scale   SubCutaneous three times a day before meals  insulin lispro Injectable (ADMELOG) 2 Unit(s) SubCutaneous three times a day before meals  losartan 25 milliGRAM(s) Oral daily  pantoprazole    Tablet 40 milliGRAM(s) Oral before breakfast  sodium chloride 0.45%. 1000 milliLiter(s) (50 mL/Hr) IV Continuous <Continuous>  tamsulosin 0.4 milliGRAM(s) Oral at bedtime      MEDICATIONS  (PRN):  acetaminophen   Tablet .. 650 milliGRAM(s) Oral every 6 hours PRN Temp greater or equal to 38C (100.4F), Moderate Pain (4 - 6)       Medications up to date at time of exam.      PHYSICAL EXAMINATION:  Patient has no new complaints.  GENERAL: The patient is a well-developed, well-nourished, in no apparent distress.     Vital Signs Last 24 Hrs  T(C): 36.8 (16 Jan 2021 16:58), Max: 36.9 (15 Shyam 2021 20:11)  T(F): 98.3 (16 Jan 2021 16:58), Max: 98.4 (15 Shyam 2021 20:11)  HR: 94 (16 Jan 2021 16:58) (64 - 99)  BP: 111/67 (16 Jan 2021 16:58) (101/60 - 113/69)  BP(mean): --  RR: 17 (16 Jan 2021 16:58) (17 - 19)  SpO2: 95% (16 Jan 2021 16:58) (94% - 96%)   (if applicable)    Chest Tube (if applicable)    HEENT: Head is normocephalic and atraumatic. Extraocular muscles are intact. Mucous membranes are moist.     NECK: Supple, no palpable adenopathy.    LUNGS: Clear to auscultation, no wheezing, rales, or rhonchi.    HEART: Regular rate and rhythm without murmur.    ABDOMEN: Soft, nontender, and nondistended.  No hepatosplenomegaly is noted.    : No painful voiding, no pelvic pain    EXTREMITIES: Without any cyanosis, clubbing, rash, lesions or edema.    NEUROLOGIC: Awake, alert, oriented, grossly intact    SKIN: Warm, dry, good turgor.      LABS:                        14.3   7.44  )-----------( 209      ( 16 Jan 2021 06:52 )             43.4     01-16    140  |  104  |  28<H>  ----------------------------<  73  4.2   |  27  |  1.37<H>    Ca    8.9      16 Jan 2021 06:52    TPro  5.8<L>  /  Alb  2.2<L>  /  TBili  1.5<H>  /  DBili  x   /  AST  30  /  ALT  47  /  AlkPhos  53  01-16                        MICROBIOLOGY: (if applicable)    RADIOLOGY & ADDITIONAL STUDIES:  EKG:   CXR:  ECHO:    IMPRESSION: 85y Male PAST MEDICAL & SURGICAL HISTORY:  BPH (benign prostatic hyperplasia)    HLD (hyperlipidemia)    DM (diabetes mellitus)    HTN (hypertension)    History of appendectomy     p/w       IMP: This is an 85 yr old man , non smoker with HTN, DM uncontrol , BPH  admitted for  hypoxia      ASS   Acute Hypoxic Resp Failure   PNA b/l   COVID-19 infection    DM uncontrol    HTN   CHINYERE    Sugg:  -isolate: contact and air borne   -O2 supp to maintain O2 sat>90%  -s/p  decadron   -IVF  -blood sugar control   -dvt/gi prophy  -monitor biomarkers and LFT TIW

## 2021-01-16 NOTE — PROGRESS NOTE ADULT - SUBJECTIVE AND OBJECTIVE BOX
Interval Events:    tolerating po intake  poc glucose tightly controlled  fasting  requiring significantly less insulin after completing decadron  Allergies    No Known Allergies    Intolerances      Endocrine/Metabolic Medications:  atorvastatin 40 milliGRAM(s) Oral at bedtime  insulin glargine Injectable (LANTUS) 16 Unit(s) SubCutaneous at bedtime  insulin lispro (ADMELOG) corrective regimen sliding scale   SubCutaneous three times a day before meals  insulin lispro Injectable (ADMELOG) 2 Unit(s) SubCutaneous three times a day before meals      Vital Signs Last 24 Hrs  T(C): 36.6 (16 Jan 2021 11:39), Max: 36.9 (15 Shyam 2021 20:11)  T(F): 97.9 (16 Jan 2021 11:39), Max: 98.4 (15 Shyam 2021 20:11)  HR: 93 (16 Jan 2021 11:39) (64 - 104)  BP: 101/60 (16 Jan 2021 11:39) (101/60 - 122/70)  BP(mean): --  RR: 18 (16 Jan 2021 11:39) (18 - 19)  SpO2: 96% (16 Jan 2021 11:39) (94% - 96%)      PHYSICAL EXAM  Constitutional:    NC/AT:    HEENT:    Neck:  No JVD  Respiratory:  reduced breath sounds b/l bases    Cardiovascular:  RR     Gastrointestinal: Soft     Extremities: without cyanosis    Neurological:  non focal      LABS                        14.3   7.44  )-----------( 209      ( 16 Jan 2021 06:52 )             43.4                               140    |  104    |  28                  Calcium: 8.9   / iCa: x      (01-16 @ 06:52)    ----------------------------<  73        Magnesium: x                                4.2     |  27     |  1.37             Phosphorous: x        TPro  5.8    /  Alb  2.2    /  TBili  1.5    /  DBili  x      /  AST  30     /  ALT  47     /  AlkPhos  53     16 Jan 2021 06:52    CAPILLARY BLOOD GLUCOSE      POCT Blood Glucose.: 177 mg/dL (16 Jan 2021 12:00)  POCT Blood Glucose.: 78 mg/dL (16 Jan 2021 07:52)  POCT Blood Glucose.: 133 mg/dL (15 Shyam 2021 21:47)  POCT Blood Glucose.: 185 mg/dL (15 Shyam 2021 16:49)        Assesment/plan          86 yo male with multiple comorbidities including h/o DM type 2, HTN, HLD admitted with fatigue, chest pain    endocrinology consulted for glycemic management    DM type 2  uncontrolled  HBA1C 7.7  complicated by uncontrolled hyperglycemia, high dose steroid use, acute COVID 19  home regimen:  actos 45mg daily, glipizide 10mg bid, farxiga, semaglutide 14mg daily  follows with Dr Hadley outpatient    recommendations:  completed dexamethasone on 1/12  requiring significantly less basal/bolus since  fasting glucose tightly controlled  reduce basal and sliding scale  - reduce insulin lantus to 12 units daily  - can continue insulin lispro 2 units pre meals  - reduce to low dose sliding scale  - reassess based on requirements  - goal inpatient glucose range: 140-180mg/dl    tentative discharge regimen:  since requiring significantly less insulin, can reduce diabetes medications with higher r/o side effects-  stop actos and farxiga  discussed with patient- may need insulin outpatient depending on doses he requires  he is agreeable to it  stop glipizide 10mg bid- high risk of prolonged hypoglycemia  stop actos 45mg daily   stop farxiga  start insulin lantus 16 units daily  continue semaglutide po 14mg daily,     CHINYERE  cautious insulin dosing d/t reduced renal insulin clearance    COVID 19  on dexamethasone  remdesivir  hyperglycemic- adjust insulin regimen    HLD  LDL at goal  on repatha, crestor 20 as outpatient  continue atorvastatin 40mg daily while inpatient      Discussed with primary team  Please call Endocrine- [phone]- Dr Katarina Sandoval as needed

## 2021-01-16 NOTE — PROGRESS NOTE ADULT - PROBLEM SELECTOR PLAN 1
- Presented with chest pain, atypical  - COVID positive, with CXR showing b/l infiltrates  - No further events on Tele   - Likely pain is due to COVID infection and respiratory distress  - Troponin  negative x2  - Observe on tele   as per cards no further w/u

## 2021-01-16 NOTE — PROGRESS NOTE ADULT - SUBJECTIVE AND OBJECTIVE BOX
[facility] NEPHROLOGY- PROGRESS NOTE    Patient is a 86yo [place]kin[place] speaking Male with DM, HTN, BPH, HLD p/w abd pain and intermittent chest pain x 7 days. Found to be +COVID-19. Nephrology consulted for Elevated serum creatinine.co    REVIEW OF SYSTEMS:  Gen: no changes in weight  Cards: no chest pain  Resp: no SOB  GI: no nausea or vomiting or diarrhea  Vascular: no LE edema      No Known Allergies      Hospital Medications: Medications reviewed    VITALS:  T(F): 98.3 (01-16-21 @ 16:58), Max: 98.4 (01-15-21 @ 20:11)  HR: 94 (01-16-21 @ 16:58)  BP: 111/67 (01-16-21 @ 16:58)  RR: 17 (01-16-21 @ 16:58)  SpO2: 95% (01-16-21 @ 16:58)  Wt(kg): --    01-15 @ 07:01  -  01-16 @ 07:00  --------------------------------------------------------  IN: 260 mL / OUT: 500 mL / NET: -240 mL    PHYSICAL EXAM:  Gen: NAD, calm  Cards: RRR, +S1/S2, no M/G/R  Resp: CTA ant  GI: soft, NT/ND, NABS  Vascular: no LE edema B/L    LABS:  01-16    140  |  104  |  28<H>  ----------------------------<  73  4.2   |  27  |  1.37<H>    Ca    8.9      16 Jan 2021 06:52    TPro  5.8<L>  /  Alb  2.2<L>  /  TBili  1.5<H>  /  DBili      /  AST  30  /  ALT  47  /  AlkPhos  53  01-16    Creatinine Trend: 1.37 <--, 1.28 <--, 1.36 <--, 1.32 <--, 1.54 <--, 1.57 <--, 1.59 <--                        14.3   7.44  )-----------( 209      ( 16 Jan 2021 06:52 )             43.4

## 2021-01-17 LAB
ALBUMIN SERPL ELPH-MCNC: 2.3 G/DL — LOW (ref 3.5–5)
ALP SERPL-CCNC: 57 U/L — SIGNIFICANT CHANGE UP (ref 40–120)
ALT FLD-CCNC: 43 U/L DA — SIGNIFICANT CHANGE UP (ref 10–60)
ANION GAP SERPL CALC-SCNC: 9 MMOL/L — SIGNIFICANT CHANGE UP (ref 5–17)
AST SERPL-CCNC: 29 U/L — SIGNIFICANT CHANGE UP (ref 10–40)
BILIRUB SERPL-MCNC: 1.3 MG/DL — HIGH (ref 0.2–1.2)
BUN SERPL-MCNC: 31 MG/DL — HIGH (ref 7–18)
CALCIUM SERPL-MCNC: 9.1 MG/DL — SIGNIFICANT CHANGE UP (ref 8.4–10.5)
CHLORIDE SERPL-SCNC: 105 MMOL/L — SIGNIFICANT CHANGE UP (ref 96–108)
CO2 SERPL-SCNC: 25 MMOL/L — SIGNIFICANT CHANGE UP (ref 22–31)
CREAT SERPL-MCNC: 1.46 MG/DL — HIGH (ref 0.5–1.3)
GLUCOSE BLDC GLUCOMTR-MCNC: 113 MG/DL — HIGH (ref 70–99)
GLUCOSE BLDC GLUCOMTR-MCNC: 160 MG/DL — HIGH (ref 70–99)
GLUCOSE BLDC GLUCOMTR-MCNC: 199 MG/DL — HIGH (ref 70–99)
GLUCOSE BLDC GLUCOMTR-MCNC: 248 MG/DL — HIGH (ref 70–99)
GLUCOSE SERPL-MCNC: 156 MG/DL — HIGH (ref 70–99)
HCT VFR BLD CALC: 41.9 % — SIGNIFICANT CHANGE UP (ref 39–50)
HGB BLD-MCNC: 14 G/DL — SIGNIFICANT CHANGE UP (ref 13–17)
MCHC RBC-ENTMCNC: 31.3 PG — SIGNIFICANT CHANGE UP (ref 27–34)
MCHC RBC-ENTMCNC: 33.4 GM/DL — SIGNIFICANT CHANGE UP (ref 32–36)
MCV RBC AUTO: 93.7 FL — SIGNIFICANT CHANGE UP (ref 80–100)
NRBC # BLD: 0 /100 WBCS — SIGNIFICANT CHANGE UP (ref 0–0)
PLATELET # BLD AUTO: 206 K/UL — SIGNIFICANT CHANGE UP (ref 150–400)
POTASSIUM SERPL-MCNC: 4.6 MMOL/L — SIGNIFICANT CHANGE UP (ref 3.5–5.3)
POTASSIUM SERPL-SCNC: 4.6 MMOL/L — SIGNIFICANT CHANGE UP (ref 3.5–5.3)
PROT SERPL-MCNC: 5.8 G/DL — LOW (ref 6–8.3)
RBC # BLD: 4.47 M/UL — SIGNIFICANT CHANGE UP (ref 4.2–5.8)
RBC # FLD: 13.4 % — SIGNIFICANT CHANGE UP (ref 10.3–14.5)
SODIUM SERPL-SCNC: 139 MMOL/L — SIGNIFICANT CHANGE UP (ref 135–145)
WBC # BLD: 7.34 K/UL — SIGNIFICANT CHANGE UP (ref 3.8–10.5)
WBC # FLD AUTO: 7.34 K/UL — SIGNIFICANT CHANGE UP (ref 3.8–10.5)

## 2021-01-17 RX ORDER — POLYETHYLENE GLYCOL 3350 17 G/17G
17 POWDER, FOR SOLUTION ORAL ONCE
Refills: 0 | Status: COMPLETED | OUTPATIENT
Start: 2021-01-17 | End: 2021-01-17

## 2021-01-17 RX ADMIN — Medication 81 MILLIGRAM(S): at 12:47

## 2021-01-17 RX ADMIN — HEPARIN SODIUM 5000 UNIT(S): 5000 INJECTION INTRAVENOUS; SUBCUTANEOUS at 04:38

## 2021-01-17 RX ADMIN — ATORVASTATIN CALCIUM 40 MILLIGRAM(S): 80 TABLET, FILM COATED ORAL at 20:53

## 2021-01-17 RX ADMIN — Medication 4: at 11:45

## 2021-01-17 RX ADMIN — HEPARIN SODIUM 5000 UNIT(S): 5000 INJECTION INTRAVENOUS; SUBCUTANEOUS at 12:47

## 2021-01-17 RX ADMIN — PANTOPRAZOLE SODIUM 40 MILLIGRAM(S): 20 TABLET, DELAYED RELEASE ORAL at 05:12

## 2021-01-17 RX ADMIN — HEPARIN SODIUM 5000 UNIT(S): 5000 INJECTION INTRAVENOUS; SUBCUTANEOUS at 20:53

## 2021-01-17 RX ADMIN — Medication 2: at 08:31

## 2021-01-17 RX ADMIN — Medication 2 UNIT(S): at 17:00

## 2021-01-17 RX ADMIN — TAMSULOSIN HYDROCHLORIDE 0.4 MILLIGRAM(S): 0.4 CAPSULE ORAL at 20:53

## 2021-01-17 RX ADMIN — Medication 2: at 17:00

## 2021-01-17 RX ADMIN — POLYETHYLENE GLYCOL 3350 17 GRAM(S): 17 POWDER, FOR SOLUTION ORAL at 08:31

## 2021-01-17 RX ADMIN — Medication 2 UNIT(S): at 11:45

## 2021-01-17 RX ADMIN — Medication 2 UNIT(S): at 08:31

## 2021-01-17 RX ADMIN — INSULIN GLARGINE 12 UNIT(S): 100 INJECTION, SOLUTION SUBCUTANEOUS at 21:38

## 2021-01-17 NOTE — PROGRESS NOTE ADULT - SUBJECTIVE AND OBJECTIVE BOX
PGY-1 Progress Note discussed with attending    PAGER #: [[phone]] TILL 5:00 PM  PLEASE CONTACT ON CALL TEAM:  - On Call Team (Please refer to Jag) FROM 5:00 PM - 8:30PM  - Nightfloat Team FROM 8:30 -7:30 AM    CHIEF COMPLAINT & BRIEF HOSPITAL COURSE:    INTERVAL HPI/OVERNIGHT EVENTS:       REVIEW OF SYSTEMS:  CONSTITUTIONAL: No fever, weight loss, or fatigue  RESPIRATORY: No cough, wheezing, chills or hemoptysis; No shortness of breath  CARDIOVASCULAR: No chest pain, palpitations, dizziness, or leg swelling  GASTROINTESTINAL: No abdominal pain. No nausea, vomiting, or hematemesis; No diarrhea or constipation. No melena or hematochezia.  GENITOURINARY: No dysuria or hematuria, urinary frequency  NEUROLOGICAL: No headaches, memory loss, loss of strength, numbness, or tremors  SKIN: No itching, burning, rashes, or lesions     MEDICATIONS  (STANDING):  aspirin enteric coated 81 milliGRAM(s) Oral daily  atorvastatin 40 milliGRAM(s) Oral at bedtime  heparin   Injectable 5000 Unit(s) SubCutaneous every 8 hours  insulin glargine Injectable (LANTUS) 12 Unit(s) SubCutaneous at bedtime  insulin lispro (ADMELOG) corrective regimen sliding scale   SubCutaneous three times a day before meals  insulin lispro Injectable (ADMELOG) 2 Unit(s) SubCutaneous three times a day before meals  losartan 25 milliGRAM(s) Oral daily  pantoprazole    Tablet 40 milliGRAM(s) Oral before breakfast  sodium chloride 0.45%. 1000 milliLiter(s) (50 mL/Hr) IV Continuous <Continuous>  tamsulosin 0.4 milliGRAM(s) Oral at bedtime    MEDICATIONS  (PRN):  acetaminophen   Tablet .. 650 milliGRAM(s) Oral every 6 hours PRN Temp greater or equal to 38C (100.4F), Moderate Pain (4 - 6)      Vital Signs Last 24 Hrs  T(C): 36.6 (17 Jan 2021 07:29), Max: 36.9 (16 Jan 2021 21:22)  T(F): 97.8 (17 Jan 2021 07:29), Max: 98.5 (16 Jan 2021 21:22)  HR: 85 (17 Jan 2021 07:29) (84 - 99)  BP: 109/64 (17 Jan 2021 07:29) (101/60 - 111/67)  BP(mean): --  RR: 18 (17 Jan 2021 07:29) (17 - 18)  SpO2: 94% (17 Jan 2021 07:29) (94% - 96%)    PHYSICAL EXAMINATION:  GENERAL: NAD, well built  HEAD:  Atraumatic, Normocephalic  EYES:  conjunctiva and sclera clear  NECK: Supple, No JVD, Normal thyroid  CHEST/LUNG: Clear to auscultation. Clear to percussion bilaterally; No rales, rhonchi, wheezing, or rubs  HEART: Regular rate and rhythm; No murmurs, rubs, or gallops  ABDOMEN: Soft, Nontender, Nondistended; Bowel sounds present  NERVOUS SYSTEM:  Alert & Oriented X3,    EXTREMITIES:  2+ Peripheral Pulses, No clubbing, cyanosis, or edema  SKIN: warm dry                          14.0   7.34  )-----------( 206      ( 17 Jan 2021 07:05 )             41.9     01-17    139  |  105  |  31<H>  ----------------------------<  156<H>  4.6   |  25  |  1.46<H>    Ca    9.1      17 Jan 2021 07:05    TPro  5.8<L>  /  Alb  2.3<L>  /  TBili  1.3<H>  /  DBili  x   /  AST  29  /  ALT  43  /  AlkPhos  57  01-17    LIVER FUNCTIONS - ( 17 Jan 2021 07:05 )  Alb: 2.3 g/dL / Pro: 5.8 g/dL / ALK PHOS: 57 U/L / ALT: 43 U/L DA / AST: 29 U/L / GGT: x                   CAPILLARY BLOOD GLUCOSE      RADIOLOGY & ADDITIONAL TESTS:                   PGY-1 Progress Note discussed with attending    PAGER #: [[phone]] TILL 5:00 PM  PLEASE CONTACT ON CALL TEAM:  - On Call Team (Please refer to Jag) FROM 5:00 PM - 8:30PM  - Nightfloat Team FROM 8:30 -7:30 AM    CHIEF COMPLAINT & BRIEF HOSPITAL COURSE:  85 year old male with significant medical history of DM, HLD, HTN, BPH and surgical history of appendectomy presenting to the ED with complaints of abdominal pain and intermittent chest pain for the last 7 days. He had fever 7 days ago, since then he is feeling weak, had two falls, one was 3 days ago and the other was yesterday. He denies LOC, syncope or prodromal symptoms. He says he hit his head at back. Reports that the chest pain is on and off, 5/10, non radiating, feels like pressure and tightness, associated with breathing difficulty. Relates that he has not been able to eat, having loss of appetite, but does feel thirsty. Denies significant weight loss, cough, abdominal pain, nausea, vomiting, headache, visual changes, or any other acute complaints. Patient was found to be COVID positive , started on decadron, remdisivir and supportive measures. US kidney for CHINYERE/ obstruction deferred due to Ultrasound dept policy. Patient symptomatically improved and was taken off oxygen 1/14, saturating well on RA. he was re-evaluated by PT and found to have some deficits for which he could use PT.     INTERVAL HPI/OVERNIGHT EVENTS:   Patient was examined while _ was lying in bed, Aox3, responding appropriately to questions, in NAD. _ has normal bowel and bladder movements, and denies any other acute complaints.     REVIEW OF SYSTEMS:  CONSTITUTIONAL: No fever, weight loss, or fatigue  RESPIRATORY: No cough, wheezing, chills or hemoptysis; No shortness of breath  CARDIOVASCULAR: No chest pain, palpitations, dizziness, or leg swelling  GASTROINTESTINAL: No abdominal pain. No nausea, vomiting, or hematemesis; No diarrhea or constipation. No melena or hematochezia.  GENITOURINARY: No dysuria or hematuria, urinary frequency  NEUROLOGICAL: No headaches, memory loss, loss of strength, numbness, or tremors  SKIN: No itching, burning, rashes, or lesions     MEDICATIONS  (STANDING):  aspirin enteric coated 81 milliGRAM(s) Oral daily  atorvastatin 40 milliGRAM(s) Oral at bedtime  heparin   Injectable 5000 Unit(s) SubCutaneous every 8 hours  insulin glargine Injectable (LANTUS) 12 Unit(s) SubCutaneous at bedtime  insulin lispro (ADMELOG) corrective regimen sliding scale   SubCutaneous three times a day before meals  insulin lispro Injectable (ADMELOG) 2 Unit(s) SubCutaneous three times a day before meals  losartan 25 milliGRAM(s) Oral daily  pantoprazole    Tablet 40 milliGRAM(s) Oral before breakfast  sodium chloride 0.45%. 1000 milliLiter(s) (50 mL/Hr) IV Continuous <Continuous>  tamsulosin 0.4 milliGRAM(s) Oral at bedtime    MEDICATIONS  (PRN):  acetaminophen   Tablet .. 650 milliGRAM(s) Oral every 6 hours PRN Temp greater or equal to 38C (100.4F), Moderate Pain (4 - 6)      Vital Signs Last 24 Hrs  T(C): 36.6 (17 Jan 2021 07:29), Max: 36.9 (16 Jan 2021 21:22)  T(F): 97.8 (17 Jan 2021 07:29), Max: 98.5 (16 Jan 2021 21:22)  HR: 85 (17 Jan 2021 07:29) (84 - 99)  BP: 109/64 (17 Jan 2021 07:29) (101/60 - 111/67)  BP(mean): --  RR: 18 (17 Jan 2021 07:29) (17 - 18)  SpO2: 94% (17 Jan 2021 07:29) (94% - 96%)    PHYSICAL EXAMINATION:  GENERAL: NAD, well built  HEAD:  Atraumatic, Normocephalic  EYES:  conjunctiva and sclera clear  NECK: Supple, No JVD, Normal thyroid  CHEST/LUNG: Clear to auscultation. Clear to percussion bilaterally; No rales, rhonchi, wheezing, or rubs  HEART: Regular rate and rhythm; No murmurs, rubs, or gallops  ABDOMEN: Soft, Nontender, Nondistended; Bowel sounds present  NERVOUS SYSTEM:  Alert & Oriented X3,    EXTREMITIES:  2+ Peripheral Pulses, No clubbing, cyanosis, or edema  SKIN: warm dry                          14.0   7.34  )-----------( 206      ( 17 Jan 2021 07:05 )             41.9     01-17    139  |  105  |  31<H>  ----------------------------<  156<H>  4.6   |  25  |  1.46<H>    Ca    9.1      17 Jan 2021 07:05    TPro  5.8<L>  /  Alb  2.3<L>  /  TBili  1.3<H>  /  DBili  x   /  AST  29  /  ALT  43  /  AlkPhos  57  01-17    LIVER FUNCTIONS - ( 17 Jan 2021 07:05 )  Alb: 2.3 g/dL / Pro: 5.8 g/dL / ALK PHOS: 57 U/L / ALT: 43 U/L DA / AST: 29 U/L / GGT: x                   CAPILLARY BLOOD GLUCOSE      RADIOLOGY & ADDITIONAL TESTS:                   PGY-1 Progress Note discussed with attending    PAGER #: [[phone]] TILL 5:00 PM  PLEASE CONTACT ON CALL TEAM:  - On Call Team (Please refer to Jag) FROM 5:00 PM - 8:30PM  - Nightfloat Team FROM 8:30 -7:30 AM    CHIEF COMPLAINT & BRIEF HOSPITAL COURSE:  85 year old male with significant medical history of DM, HLD, HTN, BPH and surgical history of appendectomy presenting to the ED with complaints of abdominal pain and intermittent chest pain for the last 7 days. He had fever 7 days ago, since then he is feeling weak, had two falls, one was 3 days ago and the other was yesterday. He denies LOC, syncope or prodromal symptoms. He says he hit his head at back. Reports that the chest pain is on and off, 5/10, non radiating, feels like pressure and tightness, associated with breathing difficulty. Relates that he has not been able to eat, having loss of appetite, but does feel thirsty. Denies significant weight loss, cough, abdominal pain, nausea, vomiting, headache, visual changes, or any other acute complaints. Patient was found to be COVID positive , started on decadron, remdisivir and supportive measures. US kidney for CHINYERE/ obstruction deferred due to Ultrasound dept policy. Patient symptomatically improved and was taken off oxygen 1/14, saturating well on RA. he was re-evaluated by PT and found to have some deficits for which he could use PT.     INTERVAL HPI/OVERNIGHT EVENTS:   Patient was examined while he was lying in bed, Aox3, responding appropriately to questions, in NAD. He has normal bowel and bladder movements, and denies any other acute complaints. Medically stable for discharge to [facility].    REVIEW OF SYSTEMS:  CONSTITUTIONAL: No fever, weight loss, or fatigue  RESPIRATORY: Improving Cough, No wheezing, chills or hemoptysis  CARDIOVASCULAR: No chest pain, palpitations, dizziness, or leg swelling  GASTROINTESTINAL: No abdominal pain. No nausea, vomiting, or hematemesis; No diarrhea or constipation. No melena or hematochezia.  GENITOURINARY: No dysuria or hematuria, urinary frequency  NEUROLOGICAL: No headaches, memory loss, loss of strength, numbness, or tremors  SKIN: No itching, burning, rashes, or lesions     MEDICATIONS  (STANDING):  aspirin enteric coated 81 milliGRAM(s) Oral daily  atorvastatin 40 milliGRAM(s) Oral at bedtime  heparin   Injectable 5000 Unit(s) SubCutaneous every 8 hours  insulin glargine Injectable (LANTUS) 12 Unit(s) SubCutaneous at bedtime  insulin lispro (ADMELOG) corrective regimen sliding scale   SubCutaneous three times a day before meals  insulin lispro Injectable (ADMELOG) 2 Unit(s) SubCutaneous three times a day before meals  losartan 25 milliGRAM(s) Oral daily  pantoprazole    Tablet 40 milliGRAM(s) Oral before breakfast  sodium chloride 0.45%. 1000 milliLiter(s) (50 mL/Hr) IV Continuous <Continuous>  tamsulosin 0.4 milliGRAM(s) Oral at bedtime    MEDICATIONS  (PRN):  acetaminophen   Tablet .. 650 milliGRAM(s) Oral every 6 hours PRN Temp greater or equal to 38C (100.4F), Moderate Pain (4 - 6)      Vital Signs Last 24 Hrs  T(C): 36.6 (17 Jan 2021 07:29), Max: 36.9 (16 Jan 2021 21:22)  T(F): 97.8 (17 Jan 2021 07:29), Max: 98.5 (16 Jan 2021 21:22)  HR: 85 (17 Jan 2021 07:29) (84 - 99)  BP: 109/64 (17 Jan 2021 07:29) (101/60 - 111/67)  BP(mean): --  RR: 18 (17 Jan 2021 07:29) (17 - 18)  SpO2: 94% (17 Jan 2021 07:29) (94% - 96%)    PHYSICAL EXAMINATION:  GENERAL: NAD, thin  male, in NAD, on RA   HEAD:  Atraumatic, Normocephalic  EYES:  conjunctiva and sclera clear  NECK: Supple, No JVD, Normal thyroid  CHEST/LUNG: Clear to auscultation. Clear to percussion bilaterally; No rales, rhonchi, wheezing, or rubs  HEART: Regular rate and rhythm; No murmurs, rubs, or gallops  ABDOMEN: Soft, Nontender, Nondistended; Bowel sounds present  NERVOUS SYSTEM:  Alert & Oriented X3,no motor or sensory loss   EXTREMITIES:  2+ Peripheral Pulses, No clubbing, cyanosis, or edema  SKIN: warm dry                          14.0   7.34  )-----------( 206      ( 17 Jan 2021 07:05 )             41.9     01-17    139  |  105  |  31<H>  ----------------------------<  156<H>  4.6   |  25  |  1.46<H>    Ca    9.1      17 Jan 2021 07:05    TPro  5.8<L>  /  Alb  2.3<L>  /  TBili  1.3<H>  /  DBili  x   /  AST  29  /  ALT  43  /  AlkPhos  57  01-17    LIVER FUNCTIONS - ( 17 Jan 2021 07:05 )  Alb: 2.3 g/dL / Pro: 5.8 g/dL / ALK PHOS: 57 U/L / ALT: 43 U/L DA / AST: 29 U/L / GGT: x                   CAPILLARY BLOOD GLUCOSE      RADIOLOGY & ADDITIONAL TESTS:

## 2021-01-17 NOTE — PROGRESS NOTE ADULT - SUBJECTIVE AND OBJECTIVE BOX
[facility] NEPHROLOGY- PROGRESS NOTE    Patient is a 84yo [place] speaking Male with DM, HTN, BPH, HLD p/w abd pain and intermittent chest pain x 7 days. Found to be +COVID-19. Nephrology consulted for Elevated serum creatinine.co    REVIEW OF SYSTEMS:  Gen: no changes in weight  Cards: no chest pain  Resp: no SOB  GI: no nausea or vomiting or diarrhea  Vascular: no LE edema      No Known Allergies      Hospital Medications: Medications reviewed    VITALS:  T(F): 98 (01-17-21 @ 11:21), Max: 98.5 (01-16-21 @ 21:22)  HR: 95 (01-17-21 @ 11:21)  BP: 102/60 (01-17-21 @ 11:21)  RR: 18 (01-17-21 @ 11:21)  SpO2: 93% (01-17-21 @ 11:21)  Wt(kg): --    PHYSICAL EXAM:  Gen: NAD, calm  Cards: RRR, +S1/S2, no M/G/R  Resp: CTA ant  GI: soft, NT/ND, NABS  Vascular: no LE edema B/L      LABS:  01-17    139  |  105  |  31<H>  ----------------------------<  156<H>  4.6   |  25  |  1.46<H>    Ca    9.1      17 Jan 2021 07:05    TPro  5.8<L>  /  Alb  2.3<L>  /  TBili  1.3<H>  /  DBili      /  AST  29  /  ALT  43  /  AlkPhos  57  01-17    Creatinine Trend: 1.46 <--, 1.37 <--, 1.28 <--, 1.36 <--, 1.32 <--, 1.54 <--, 1.57 <--                        14.0   7.34  )-----------( 206      ( 17 Jan 2021 07:05 )             41.9

## 2021-01-17 NOTE — PROGRESS NOTE ADULT - SUBJECTIVE AND OBJECTIVE BOX
Time of Visit:  Patient seen and examined.     MEDICATIONS  (STANDING):  aspirin enteric coated 81 milliGRAM(s) Oral daily  atorvastatin 40 milliGRAM(s) Oral at bedtime  heparin   Injectable 5000 Unit(s) SubCutaneous every 8 hours  insulin glargine Injectable (LANTUS) 12 Unit(s) SubCutaneous at bedtime  insulin lispro (ADMELOG) corrective regimen sliding scale   SubCutaneous three times a day before meals  insulin lispro Injectable (ADMELOG) 2 Unit(s) SubCutaneous three times a day before meals  losartan 25 milliGRAM(s) Oral daily  pantoprazole    Tablet 40 milliGRAM(s) Oral before breakfast  tamsulosin 0.4 milliGRAM(s) Oral at bedtime      MEDICATIONS  (PRN):  acetaminophen   Tablet .. 650 milliGRAM(s) Oral every 6 hours PRN Temp greater or equal to 38C (100.4F), Moderate Pain (4 - 6)       Medications up to date at time of exam.      PHYSICAL EXAMINATION:  Patient has no new complaints.  GENERAL: The patient is a well-developed, well-nourished, in no apparent distress.     Vital Signs Last 24 Hrs  T(C): 36.9 (17 Jan 2021 16:14), Max: 36.9 (16 Jan 2021 21:22)  T(F): 98.5 (17 Jan 2021 16:14), Max: 98.5 (16 Jan 2021 21:22)  HR: 99 (17 Jan 2021 16:14) (84 - 99)  BP: 108/69 (17 Jan 2021 16:14) (101/68 - 111/67)  BP(mean): --  RR: 18 (17 Jan 2021 16:14) (17 - 18)  SpO2: 92% (17 Jan 2021 16:14) (92% - 95%)   (if applicable)    Chest Tube (if applicable)    HEENT: Head is normocephalic and atraumatic. Extraocular muscles are intact. Mucous membranes are moist.     NECK: Supple, no palpable adenopathy.    LUNGS: Clear to auscultation, no wheezing, rales, or rhonchi.    HEART: Regular rate and rhythm without murmur.    ABDOMEN: Soft, nontender, and nondistended.  No hepatosplenomegaly is noted.    : No painful voiding, no pelvic pain    EXTREMITIES: Without any cyanosis, clubbing, rash, lesions or edema.    NEUROLOGIC: Awake, alert, oriented, grossly intact    SKIN: Warm, dry, good turgor.      LABS:                        14.0   7.34  )-----------( 206      ( 17 Jan 2021 07:05 )             41.9     01-17    139  |  105  |  31<H>  ----------------------------<  156<H>  4.6   |  25  |  1.46<H>    Ca    9.1      17 Jan 2021 07:05    TPro  5.8<L>  /  Alb  2.3<L>  /  TBili  1.3<H>  /  DBili  x   /  AST  29  /  ALT  43  /  AlkPhos  57  01-17                        MICROBIOLOGY: (if applicable)    RADIOLOGY & ADDITIONAL STUDIES:  EKG:   CXR:  ECHO:    IMPRESSION: 85y Male PAST MEDICAL & SURGICAL HISTORY:  BPH (benign prostatic hyperplasia)    HLD (hyperlipidemia)    DM (diabetes mellitus)    HTN (hypertension)    History of appendectomy     p/w           IMP: This is an 85 yr old man , non smoker with HTN, DM uncontrol , BPH  admitted for  hypoxia      ASS   Acute Hypoxic Resp Failure   PNA b/l   COVID-19 infection    DM uncontrol    HTN   CHINYERE    Sugg:  -isolate: contact and air borne   -now tolerating room air   -s/p  decadron   -IVF  -blood sugar control   -dvt/gi prophy  -monitor biomarkers and LFT TIW  -PT  -OOB to chair

## 2021-01-17 NOTE — PROGRESS NOTE ADULT - SUBJECTIVE AND OBJECTIVE BOX
late note entry   SUBJECTIVE / OVERNIGHT EVENTS: pt seen and examined    MEDICATIONS  (STANDING):  aspirin enteric coated 81 milliGRAM(s) Oral daily  atorvastatin 40 milliGRAM(s) Oral at bedtime  heparin   Injectable 5000 Unit(s) SubCutaneous every 8 hours  insulin glargine Injectable (LANTUS) 12 Unit(s) SubCutaneous at bedtime  insulin lispro (ADMELOG) corrective regimen sliding scale   SubCutaneous three times a day before meals  insulin lispro Injectable (ADMELOG) 2 Unit(s) SubCutaneous three times a day before meals  losartan 25 milliGRAM(s) Oral daily  pantoprazole    Tablet 40 milliGRAM(s) Oral before breakfast  tamsulosin 0.4 milliGRAM(s) Oral at bedtime    MEDICATIONS  (PRN):  acetaminophen   Tablet .. 650 milliGRAM(s) Oral every 6 hours PRN Temp greater or equal to 38C (100.4F), Moderate Pain (4 - 6)    Vital Signs Last 24 Hrs  T(C): 36.6 (18 Jan 2021 19:43), Max: 36.8 (18 Jan 2021 11:15)  T(F): 97.9 (18 Jan 2021 19:43), Max: 98.2 (18 Jan 2021 11:15)  HR: 94 (18 Jan 2021 19:43) (62 - 94)  BP: 109/66 (18 Jan 2021 19:43) (95/65 - 113/67)  BP(mean): --  RR: 18 (18 Jan 2021 19:43) (17 - 18)  SpO2: 98% (18 Jan 2021 19:43) (93% - 98%)    Constitutional: No fever, fatigue  Skin: No rash.  Eyes: No recent vision problems or eye pain.  ENT: No congestion, ear pain, or sore throat.  Cardiovascular: No chest pain or palpation.  Respiratory: No cough, shortness of breath, congestion, or wheezing.  Gastrointestinal: No abdominal pain, nausea, vomiting, or diarrhea.  Genitourinary: No dysuria.  Musculoskeletal: No joint swelling.  Neurologic: No headache.    PHYSICAL EXAM:  GENERAL: NAD  EYES: EOMI, PERRLA  NECK: Supple, No JVD  CHEST/LUNG: crackles at bases , no rhonchi  HEART:  S1 , S2 +  ABDOMEN: soft , bs+  EXTREMITIES:  no edema  NEUROLOGY:alert awake calm cooperative      LABS:  01-18    139  |  104  |  30<H>  ----------------------------<  158<H>  4.0   |  24  |  1.47<H>    Ca    8.8      18 Jan 2021 06:23    TPro  5.8<L>  /  Alb  2.1<L>  /  TBili  1.1  /  DBili      /  AST  32  /  ALT  41  /  AlkPhos  52  01-18    Creatinine Trend: 1.47 <--, 1.46 <--, 1.37 <--, 1.28 <--, 1.36 <--, 1.32 <--, 1.54 <--                        13.6   6.82  )-----------( 196      ( 18 Jan 2021 06:23 )             40.3     Urine Studies:            LIVER FUNCTIONS - ( 18 Jan 2021 06:23 )  Alb: 2.1 g/dL / Pro: 5.8 g/dL / ALK PHOS: 52 U/L / ALT: 41 U/L DA / AST: 32 U/L / GGT: x             Consultant(s) Notes Reviewed:      Care Discussed with Consultants/Other Providers:

## 2021-01-17 NOTE — PROGRESS NOTE ADULT - ASSESSMENT
Patient is a 86yo [place] speaking Male with DM, HTN, BPH, HLD p/w abd pain and intermittent chest pain x 7 days. Found to be +COVID-19. Nephrology consulted for Elevated serum creatinine.    1. CHINYERE- mild in the setting of ATN ; renal function at baseline. Encourage PO intake. UA with small ketones, 100 protein, large blood RBC 10-25. CK 1350 which should not cause CHINYERE. FeNa 0.45%.     Strict I/Os. Avoid nephrotoxins/ NSAIDs/ RCA. Monitor BMP.  2. CKD-3a- Dr. Mederos Spoke to PMD, Dr. Leonie Ch's office; previous SCr 1.3 on 12/24/20. Defer secondary w/u. Avoid nephrotoxins.   3. BPH- c/w flomax  4.  HTN 2/2 CKD- BP low normal; considering holding Losartan. Monitor BP  5. COVID-19- s/p Remdesivir/ steroids. plan as per primary team

## 2021-01-17 NOTE — PROGRESS NOTE ADULT - SUBJECTIVE AND OBJECTIVE BOX
Interval Events:    tolerating po intake  fasting controlled with reduced basal    Allergies    No Known Allergies    Intolerances      Endocrine/Metabolic Medications:  atorvastatin 40 milliGRAM(s) Oral at bedtime  insulin glargine Injectable (LANTUS) 12 Unit(s) SubCutaneous at bedtime  insulin lispro (ADMELOG) corrective regimen sliding scale   SubCutaneous three times a day before meals  insulin lispro Injectable (ADMELOG) 2 Unit(s) SubCutaneous three times a day before meals      Vital Signs Last 24 Hrs  T(C): 36.7 (17 Jan 2021 11:21), Max: 36.9 (16 Jan 2021 21:22)  T(F): 98 (17 Jan 2021 11:21), Max: 98.5 (16 Jan 2021 21:22)  HR: 95 (17 Jan 2021 11:21) (84 - 99)  BP: 102/60 (17 Jan 2021 11:21) (101/68 - 111/67)  BP(mean): --  RR: 18 (17 Jan 2021 11:21) (17 - 18)  SpO2: 93% (17 Jan 2021 11:21) (93% - 95%)      PHYSICAL EXAM  Constitutional:    NC/AT:    HEENT:    Neck:  No JVD  Respiratory:  reduced breath sounds b/l bases    Cardiovascular:  RR     Gastrointestinal: Soft     Extremities: without cyanosis    Neurological:  non focal      LABS                        14.0   7.34  )-----------( 206      ( 17 Jan 2021 07:05 )             41.9                               139    |  105    |  31                  Calcium: 9.1   / iCa: x      (01-17 @ 07:05)    ----------------------------<  156       Magnesium: x                                4.6     |  25     |  1.46             Phosphorous: x        TPro  5.8    /  Alb  2.3    /  TBili  1.3    /  DBili  x      /  AST  29     /  ALT  43     /  AlkPhos  57     17 Jan 2021 07:05    CAPILLARY BLOOD GLUCOSE      POCT Blood Glucose.: 248 mg/dL (17 Jan 2021 11:14)  POCT Blood Glucose.: 160 mg/dL (17 Jan 2021 08:27)  POCT Blood Glucose.: 108 mg/dL (16 Jan 2021 20:38)  POCT Blood Glucose.: 166 mg/dL (16 Jan 2021 15:40)        Assesment/plan              86 yo male with multiple comorbidities including h/o DM type 2, HTN, HLD admitted with fatigue, chest pain    endocrinology consulted for glycemic management    DM type 2  uncontrolled  HBA1C 7.7  complicated by uncontrolled hyperglycemia, high dose steroid use, acute COVID 19  home regimen:  actos 45mg daily, glipizide 10mg bid, farxiga, semaglutide 14mg daily  follows with Dr Hadley outpatient    recommendations:  completed dexamethasone on 1/12  requiring significantly less basal/bolus since  fasting glucose controlled  continue current regimen    - continue insulin lantus 12 units daily  - can continue insulin lispro 2 units pre meals  - reduce to low dose sliding scale  - reassess based on requirements  - goal inpatient glucose range: 140-180mg/dl    tentative discharge regimen:  since requiring significantly less insulin, can reduce diabetes medications with higher r/o side effects-  stop actos and farxiga  discussed with patient- may need insulin outpatient depending on doses he requires  he is agreeable to it  stop glipizide 10mg bid- high risk of prolonged hypoglycemia  stop actos 45mg daily   stop farxiga  start insulin lantus 16 units daily  continue semaglutide po 14mg daily,     CHINYERE  cautious insulin dosing d/t reduced renal insulin clearance    COVID 19  on dexamethasone  remdesivir  hyperglycemic- adjust insulin regimen    HLD  LDL at goal  on repatha crestor 20 as outpatient  continue atorvastatin 40mg daily while inpatient      Discussed with primary team  Please call Endocrine- - Dr Katarina Sandoval as needed

## 2021-01-18 LAB
ALBUMIN SERPL ELPH-MCNC: 2.1 G/DL — LOW (ref 3.5–5)
ALP SERPL-CCNC: 52 U/L — SIGNIFICANT CHANGE UP (ref 40–120)
ALT FLD-CCNC: 41 U/L DA — SIGNIFICANT CHANGE UP (ref 10–60)
ANION GAP SERPL CALC-SCNC: 11 MMOL/L — SIGNIFICANT CHANGE UP (ref 5–17)
AST SERPL-CCNC: 32 U/L — SIGNIFICANT CHANGE UP (ref 10–40)
BILIRUB SERPL-MCNC: 1.1 MG/DL — SIGNIFICANT CHANGE UP (ref 0.2–1.2)
BUN SERPL-MCNC: 30 MG/DL — HIGH (ref 7–18)
CALCIUM SERPL-MCNC: 8.8 MG/DL — SIGNIFICANT CHANGE UP (ref 8.4–10.5)
CHLORIDE SERPL-SCNC: 104 MMOL/L — SIGNIFICANT CHANGE UP (ref 96–108)
CO2 SERPL-SCNC: 24 MMOL/L — SIGNIFICANT CHANGE UP (ref 22–31)
CREAT SERPL-MCNC: 1.47 MG/DL — HIGH (ref 0.5–1.3)
GLUCOSE BLDC GLUCOMTR-MCNC: 140 MG/DL — HIGH (ref 70–99)
GLUCOSE BLDC GLUCOMTR-MCNC: 145 MG/DL — HIGH (ref 70–99)
GLUCOSE BLDC GLUCOMTR-MCNC: 194 MG/DL — HIGH (ref 70–99)
GLUCOSE BLDC GLUCOMTR-MCNC: 196 MG/DL — HIGH (ref 70–99)
GLUCOSE SERPL-MCNC: 158 MG/DL — HIGH (ref 70–99)
HCT VFR BLD CALC: 40.3 % — SIGNIFICANT CHANGE UP (ref 39–50)
HGB BLD-MCNC: 13.6 G/DL — SIGNIFICANT CHANGE UP (ref 13–17)
MCHC RBC-ENTMCNC: 31.2 PG — SIGNIFICANT CHANGE UP (ref 27–34)
MCHC RBC-ENTMCNC: 33.7 GM/DL — SIGNIFICANT CHANGE UP (ref 32–36)
MCV RBC AUTO: 92.4 FL — SIGNIFICANT CHANGE UP (ref 80–100)
NRBC # BLD: 0 /100 WBCS — SIGNIFICANT CHANGE UP (ref 0–0)
PLATELET # BLD AUTO: 196 K/UL — SIGNIFICANT CHANGE UP (ref 150–400)
POTASSIUM SERPL-MCNC: 4 MMOL/L — SIGNIFICANT CHANGE UP (ref 3.5–5.3)
POTASSIUM SERPL-SCNC: 4 MMOL/L — SIGNIFICANT CHANGE UP (ref 3.5–5.3)
PROT SERPL-MCNC: 5.8 G/DL — LOW (ref 6–8.3)
RBC # BLD: 4.36 M/UL — SIGNIFICANT CHANGE UP (ref 4.2–5.8)
RBC # FLD: 13.4 % — SIGNIFICANT CHANGE UP (ref 10.3–14.5)
SODIUM SERPL-SCNC: 139 MMOL/L — SIGNIFICANT CHANGE UP (ref 135–145)
WBC # BLD: 6.82 K/UL — SIGNIFICANT CHANGE UP (ref 3.8–10.5)
WBC # FLD AUTO: 6.82 K/UL — SIGNIFICANT CHANGE UP (ref 3.8–10.5)

## 2021-01-18 RX ORDER — PIOGLITAZONE HYDROCHLORIDE 15 MG/1
1 TABLET ORAL
Qty: 0 | Refills: 0 | DISCHARGE

## 2021-01-18 RX ORDER — EVOLOCUMAB 140 MG/ML
1 INJECTION, SOLUTION SUBCUTANEOUS
Qty: 1 | Refills: 0
Start: 2021-01-18 | End: 2021-02-16

## 2021-01-18 RX ORDER — POLYETHYLENE GLYCOL 3350 17 G/17G
17 POWDER, FOR SOLUTION ORAL ONCE
Refills: 0 | Status: COMPLETED | OUTPATIENT
Start: 2021-01-18 | End: 2021-01-18

## 2021-01-18 RX ORDER — DAPAGLIFLOZIN 10 MG/1
1 TABLET, FILM COATED ORAL
Qty: 0 | Refills: 0 | DISCHARGE

## 2021-01-18 RX ADMIN — HEPARIN SODIUM 5000 UNIT(S): 5000 INJECTION INTRAVENOUS; SUBCUTANEOUS at 13:24

## 2021-01-18 RX ADMIN — INSULIN GLARGINE 12 UNIT(S): 100 INJECTION, SOLUTION SUBCUTANEOUS at 21:48

## 2021-01-18 RX ADMIN — TAMSULOSIN HYDROCHLORIDE 0.4 MILLIGRAM(S): 0.4 CAPSULE ORAL at 21:47

## 2021-01-18 RX ADMIN — HEPARIN SODIUM 5000 UNIT(S): 5000 INJECTION INTRAVENOUS; SUBCUTANEOUS at 21:48

## 2021-01-18 RX ADMIN — PANTOPRAZOLE SODIUM 40 MILLIGRAM(S): 20 TABLET, DELAYED RELEASE ORAL at 05:17

## 2021-01-18 RX ADMIN — POLYETHYLENE GLYCOL 3350 17 GRAM(S): 17 POWDER, FOR SOLUTION ORAL at 13:25

## 2021-01-18 RX ADMIN — Medication 2 UNIT(S): at 12:16

## 2021-01-18 RX ADMIN — ATORVASTATIN CALCIUM 40 MILLIGRAM(S): 80 TABLET, FILM COATED ORAL at 21:47

## 2021-01-18 RX ADMIN — Medication 2 UNIT(S): at 17:11

## 2021-01-18 RX ADMIN — Medication 2 UNIT(S): at 07:46

## 2021-01-18 RX ADMIN — Medication 2: at 12:17

## 2021-01-18 RX ADMIN — HEPARIN SODIUM 5000 UNIT(S): 5000 INJECTION INTRAVENOUS; SUBCUTANEOUS at 04:46

## 2021-01-18 RX ADMIN — Medication 81 MILLIGRAM(S): at 12:17

## 2021-01-18 NOTE — PROGRESS NOTE ADULT - SUBJECTIVE AND OBJECTIVE BOX
PGY-1 Progress Note discussed with attending    PAGER #: [[phone]] TILL 5:00 PM  PLEASE CONTACT ON CALL TEAM:  - On Call Team (Please refer to Jag) FROM 5:00 PM - 8:30PM  - Nightfloat Team FROM 8:30 -7:30 AM    CHIEF COMPLAINT & BRIEF HOSPITAL COURSE:    INTERVAL HPI/OVERNIGHT EVENTS:       REVIEW OF SYSTEMS:  CONSTITUTIONAL: No fever, weight loss, or fatigue  RESPIRATORY: No cough, wheezing, chills or hemoptysis; No shortness of breath  CARDIOVASCULAR: No chest pain, palpitations, dizziness, or leg swelling  GASTROINTESTINAL: No abdominal pain. No nausea, vomiting, or hematemesis; No diarrhea or constipation. No melena or hematochezia.  GENITOURINARY: No dysuria or hematuria, urinary frequency  NEUROLOGICAL: No headaches, memory loss, loss of strength, numbness, or tremors  SKIN: No itching, burning, rashes, or lesions     MEDICATIONS  (STANDING):  aspirin enteric coated 81 milliGRAM(s) Oral daily  atorvastatin 40 milliGRAM(s) Oral at bedtime  heparin   Injectable 5000 Unit(s) SubCutaneous every 8 hours  insulin glargine Injectable (LANTUS) 12 Unit(s) SubCutaneous at bedtime  insulin lispro (ADMELOG) corrective regimen sliding scale   SubCutaneous three times a day before meals  insulin lispro Injectable (ADMELOG) 2 Unit(s) SubCutaneous three times a day before meals  losartan 25 milliGRAM(s) Oral daily  pantoprazole    Tablet 40 milliGRAM(s) Oral before breakfast  tamsulosin 0.4 milliGRAM(s) Oral at bedtime    MEDICATIONS  (PRN):  acetaminophen   Tablet .. 650 milliGRAM(s) Oral every 6 hours PRN Temp greater or equal to 38C (100.4F), Moderate Pain (4 - 6)      Vital Signs Last 24 Hrs  T(C): 36.7 (18 Jan 2021 07:34), Max: 37.2 (17 Jan 2021 21:09)  T(F): 98 (18 Jan 2021 07:34), Max: 98.9 (17 Jan 2021 21:09)  HR: 80 (18 Jan 2021 07:34) (62 - 99)  BP: 100/54 (18 Jan 2021 07:34) (95/65 - 110/79)  BP(mean): --  RR: 18 (18 Jan 2021 07:34) (18 - 18)  SpO2: 94% (18 Jan 2021 07:34) (92% - 94%)    PHYSICAL EXAMINATION:  GENERAL: NAD, well built  HEAD:  Atraumatic, Normocephalic  EYES:  conjunctiva and sclera clear  NECK: Supple, No JVD, Normal thyroid  CHEST/LUNG: Clear to auscultation. Clear to percussion bilaterally; No rales, rhonchi, wheezing, or rubs  HEART: Regular rate and rhythm; No murmurs, rubs, or gallops  ABDOMEN: Soft, Nontender, Nondistended; Bowel sounds present  NERVOUS SYSTEM:  Alert & Oriented X3,    EXTREMITIES:  2+ Peripheral Pulses, No clubbing, cyanosis, or edema  SKIN: warm dry                          13.6   6.82  )-----------( 196      ( 18 Jan 2021 06:23 )             40.3     01-18    139  |  104  |  30<H>  ----------------------------<  158<H>  4.0   |  24  |  1.47<H>    Ca    8.8      18 Jan 2021 06:23    TPro  5.8<L>  /  Alb  2.1<L>  /  TBili  1.1  /  DBili  x   /  AST  32  /  ALT  41  /  AlkPhos  52  01-18    LIVER FUNCTIONS - ( 18 Jan 2021 06:23 )  Alb: 2.1 g/dL / Pro: 5.8 g/dL / ALK PHOS: 52 U/L / ALT: 41 U/L DA / AST: 32 U/L / GGT: x                   CAPILLARY BLOOD GLUCOSE      RADIOLOGY & ADDITIONAL TESTS:

## 2021-01-18 NOTE — PROGRESS NOTE ADULT - ATTENDING COMMENTS
[facility] NEPHROLOGY  Junaid Brody M.D.  Michael Bolton D.O.  Nancy Mederos M.D.  Charisma Smart, MSN, ANP-C  [phone]    [address] [place] [address]
as per pts son , pt been falling prior to hospitalization and pt complaining of back pain to son , pt denies back pain to writer   PT shaun
[facility] NEPHROLOGY  Junaid Brody M.D.  Michael Bolton D.O.  Nancy Mederos M.D.  Charisma Smart, MSN, ANP-C  [phone]    [address] [place] [address]
[facility] NEPHROLOGY  Junaid Brody M.D.  Michael Bolton D.O.  Nancy Mederos M.D.  Charisma Smart, MSN, ANP-C  [phone]    [address] [place] [address]
[facility] NEPHROLOGY  Junaid Brody M.D.  Michael Bolton D.O.  Nancy Mederos M.D.  Charisma Smart, MSN, ANP-C  [phone]    [address] [place] [address]
[facility] NEPHROLOGY  Junaid Brody M.D.  Michael Bolton D.O.  Nancy Mederos M.D.  Charisma Smart, MSN, ANP-C  [phone]    [address] [place] [address]
[facility] NEPHROLOGY  Junaid Brody M.D.  Michael Bolton D.O.  Nancy Mederos M.D.  Charisma Smart, MSN, ANP-C  [phone]    [address] [place] [address]
[facility] NEPHROLOGY  Junaid Brody M.D.  Michael Bolton D.O.  Nancy Mederos M.D.  Charisma Smart, MSN, ANP-C  [phone]    [address] [place] [address]
[facility] NEPHROLOGY  Junaid Brody M.D.  Michael Bolton D.O.  Nancy Mederos M.D.  Charisma Smart, MSN, ANP-C  [phone]    [address] [place] [address]
[facility] NEPHROLOGY  Junaid Brody M.D.  Michael Bolton D.O.  Nancy Mederos M.D.  Charisma Smart, MSN, ANP-C  [phone]    [address] [place] [address]
[facility] NEPHROLOGY  Junaid Brody M.D.  Michael Bolton D.O.  Nancy Mederos M.D.  Charisma Smart, MSN, ANP-C  [phone]    [address] [place] [address]

## 2021-01-18 NOTE — PROGRESS NOTE ADULT - ASSESSMENT
Patient is a 86yo Singaporean speaking Male with DM, HTN, BPH, HLD p/w abd pain and intermittent chest pain x 7 days. Found to be +COVID-19. Nephrology consulted for Elevated serum creatinine.    1. CHINYERE- mild in the setting of ATN ; renal function at baseline. Encourage PO intake. UA with small ketones, 100 protein, large blood RBC 10-25. CK 1350 which should not cause CHINYERE. FeNa 0.45%.     Strict I/Os. Avoid nephrotoxins/ NSAIDs/ RCA. Monitor BMP.  2. CKD-3a- Dr. Mederos Spoke to PMD, Dr. Leonie Ch's office; previous SCr 1.3 on 12/24/20. Defer secondary w/u. Avoid nephrotoxins.   3. BPH- c/w flomax  4.  HTN 2/2 CKD- BP low normal; considering holding Losartan. Monitor BP  5. COVID-19- s/p Remdesivir/ steroids. plan as per primary team

## 2021-01-18 NOTE — CHART NOTE - NSCHARTNOTEFT_GEN_A_CORE
Per rn, patient has been off of enhanced supervision since 1/15, order left standing in error. Stable for discharge to facility.

## 2021-01-18 NOTE — PROGRESS NOTE ADULT - SUBJECTIVE AND OBJECTIVE BOX
Time of Visit:  Patient seen and examined.     MEDICATIONS  (STANDING):  aspirin enteric coated 81 milliGRAM(s) Oral daily  atorvastatin 40 milliGRAM(s) Oral at bedtime  heparin   Injectable 5000 Unit(s) SubCutaneous every 8 hours  insulin glargine Injectable (LANTUS) 12 Unit(s) SubCutaneous at bedtime  insulin lispro (ADMELOG) corrective regimen sliding scale   SubCutaneous three times a day before meals  insulin lispro Injectable (ADMELOG) 2 Unit(s) SubCutaneous three times a day before meals  losartan 25 milliGRAM(s) Oral daily  pantoprazole    Tablet 40 milliGRAM(s) Oral before breakfast  tamsulosin 0.4 milliGRAM(s) Oral at bedtime      MEDICATIONS  (PRN):  acetaminophen   Tablet .. 650 milliGRAM(s) Oral every 6 hours PRN Temp greater or equal to 38C (100.4F), Moderate Pain (4 - 6)       Medications up to date at time of exam.      PHYSICAL EXAMINATION:  Patient has no new complaints.  GENERAL: The patient is a well-developed, well-nourished, in no apparent distress.     Vital Signs Last 24 Hrs  T(C): 36.4 (18 Jan 2021 15:23), Max: 37.2 (17 Jan 2021 21:09)  T(F): 97.5 (18 Jan 2021 15:23), Max: 98.9 (17 Jan 2021 21:09)  HR: 88 (18 Jan 2021 15:23) (62 - 95)  BP: 113/67 (18 Jan 2021 15:23) (95/65 - 113/67)  BP(mean): --  RR: 18 (18 Jan 2021 15:23) (17 - 18)  SpO2: 97% (18 Jan 2021 15:23) (93% - 97%)   (if applicable)    Chest Tube (if applicable)    HEENT: Head is normocephalic and atraumatic. Extraocular muscles are intact. Mucous membranes are moist.     NECK: Supple, no palpable adenopathy.    LUNGS: Clear to auscultation, no wheezing, rales, or rhonchi.    HEART: Regular rate and rhythm without murmur.    ABDOMEN: Soft, nontender, and nondistended.  No hepatosplenomegaly is noted.    : No painful voiding, no pelvic pain    EXTREMITIES: Without any cyanosis, clubbing, rash, lesions or edema.    NEUROLOGIC: Awake, alert, oriented, grossly intact    SKIN: Warm, dry, good turgor.      LABS:                        13.6   6.82  )-----------( 196      ( 18 Jan 2021 06:23 )             40.3     01-18    139  |  104  |  30<H>  ----------------------------<  158<H>  4.0   |  24  |  1.47<H>    Ca    8.8      18 Jan 2021 06:23    TPro  5.8<L>  /  Alb  2.1<L>  /  TBili  1.1  /  DBili  x   /  AST  32  /  ALT  41  /  AlkPhos  52  01-18                        MICROBIOLOGY: (if applicable)    RADIOLOGY & ADDITIONAL STUDIES:  EKG:   CXR:  ECHO:    IMPRESSION: 85y Male PAST MEDICAL & SURGICAL HISTORY:  BPH (benign prostatic hyperplasia)    HLD (hyperlipidemia)    DM (diabetes mellitus)    HTN (hypertension)    History of appendectomy     p/w         IMP: This is an 85 yr old man , non smoker with HTN, DM uncontrol , BPH  admitted for  hypoxia      ASS   Acute Hypoxic Resp Failure   PNA b/l   COVID-19 infection    DM uncontrol    HTN   CHINYERE    Sugg:  -isolate: contact and air borne   -now tolerating room air   -s/p  decadron   -IVF  -blood sugar control   -dvt/gi prophy  -monitor biomarkers and LFT TIW  -PT  -OOB to chair   -out pat pulmonary f/u

## 2021-01-18 NOTE — PROGRESS NOTE ADULT - ASSESSMENT
85 year old male with significant medical history of Dm, HLD, HTn and surgical history of appendectomy presenting to the ED with complaints of abdominal pain and intermittent chest pain for the last 7 days.     Please Call Dr. Muro's office for a follow up appointment in 7-10 days

## 2021-01-18 NOTE — PROGRESS NOTE ADULT - SUBJECTIVE AND OBJECTIVE BOX
SUBJECTIVE / OVERNIGHT EVENTS: pt seen and examined    MEDICATIONS  (STANDING):  aspirin enteric coated 81 milliGRAM(s) Oral daily  atorvastatin 40 milliGRAM(s) Oral at bedtime  heparin   Injectable 5000 Unit(s) SubCutaneous every 8 hours  insulin glargine Injectable (LANTUS) 12 Unit(s) SubCutaneous at bedtime  insulin lispro (ADMELOG) corrective regimen sliding scale   SubCutaneous three times a day before meals  insulin lispro Injectable (ADMELOG) 2 Unit(s) SubCutaneous three times a day before meals  losartan 25 milliGRAM(s) Oral daily  pantoprazole    Tablet 40 milliGRAM(s) Oral before breakfast  tamsulosin 0.4 milliGRAM(s) Oral at bedtime    MEDICATIONS  (PRN):  acetaminophen   Tablet .. 650 milliGRAM(s) Oral every 6 hours PRN Temp greater or equal to 38C (100.4F), Moderate Pain (4 - 6)    Vital Signs Last 24 Hrs  T(C): 36.6 (18 Jan 2021 19:43), Max: 36.8 (18 Jan 2021 11:15)  T(F): 97.9 (18 Jan 2021 19:43), Max: 98.2 (18 Jan 2021 11:15)  HR: 94 (18 Jan 2021 19:43) (62 - 94)  BP: 109/66 (18 Jan 2021 19:43) (95/65 - 113/67)  BP(mean): --  RR: 18 (18 Jan 2021 19:43) (17 - 18)  SpO2: 98% (18 Jan 2021 19:43) (93% - 98%)    Constitutional: No fever, fatigue  Skin: No rash.  Eyes: No recent vision problems or eye pain.  ENT: No congestion, ear pain, or sore throat.  Cardiovascular: No chest pain or palpation.  Respiratory: No cough, shortness of breath, congestion, or wheezing.  Gastrointestinal: No abdominal pain, nausea, vomiting, or diarrhea.  Genitourinary: No dysuria.  Musculoskeletal: No joint swelling.  Neurologic: No headache.    PHYSICAL EXAM:  GENERAL: NAD  EYES: EOMI, PERRLA  NECK: Supple, No JVD  CHEST/LUNG: crackles at bases , no rhonchi  HEART:  S1 , S2 +  ABDOMEN: soft , bs+  EXTREMITIES:  no edema  NEUROLOGY:alert awake calm cooperative      LABS:  01-18    139  |  104  |  30<H>  ----------------------------<  158<H>  4.0   |  24  |  1.47<H>    Ca    8.8      18 Jan 2021 06:23    TPro  5.8<L>  /  Alb  2.1<L>  /  TBili  1.1  /  DBili      /  AST  32  /  ALT  41  /  AlkPhos  52  01-18    Creatinine Trend: 1.47 <--, 1.46 <--, 1.37 <--, 1.28 <--, 1.36 <--, 1.32 <--, 1.54 <--                        13.6   6.82  )-----------( 196      ( 18 Jan 2021 06:23 )             40.3     Urine Studies:            LIVER FUNCTIONS - ( 18 Jan 2021 06:23 )  Alb: 2.1 g/dL / Pro: 5.8 g/dL / ALK PHOS: 52 U/L / ALT: 41 U/L DA / AST: 32 U/L / GGT: x             Consultant(s) Notes Reviewed:      Care Discussed with Consultants/Other Providers:

## 2021-01-18 NOTE — PROGRESS NOTE ADULT - SUBJECTIVE AND OBJECTIVE BOX
Interval Events:    tolerating po intake  fasting controlled on lowered basal    Allergies    No Known Allergies    Intolerances      Endocrine/Metabolic Medications:  atorvastatin 40 milliGRAM(s) Oral at bedtime  insulin glargine Injectable (LANTUS) 12 Unit(s) SubCutaneous at bedtime  insulin lispro (ADMELOG) corrective regimen sliding scale   SubCutaneous three times a day before meals  insulin lispro Injectable (ADMELOG) 2 Unit(s) SubCutaneous three times a day before meals      Vital Signs Last 24 Hrs  T(C): 36.7 (18 Jan 2021 07:34), Max: 37.2 (17 Jan 2021 21:09)  T(F): 98 (18 Jan 2021 07:34), Max: 98.9 (17 Jan 2021 21:09)  HR: 80 (18 Jan 2021 07:34) (62 - 99)  BP: 100/54 (18 Jan 2021 07:34) (95/65 - 110/79)  BP(mean): --  RR: 18 (18 Jan 2021 07:34) (18 - 18)  SpO2: 94% (18 Jan 2021 07:34) (92% - 94%)      PHYSICAL EXAM    Constitutional:    NC/AT:    HEENT:    Neck:  No JVD  Respiratory:  reduced breath sounds b/l bases    Cardiovascular:  RR     Gastrointestinal: Soft     Extremities: without cyanosis    Neurological:  non focal      LABS                        13.6   6.82  )-----------( 196      ( 18 Jan 2021 06:23 )             40.3                               139    |  104    |  30                  Calcium: 8.8   / iCa: x      (01-18 @ 06:23)    ----------------------------<  158       Magnesium: x                                4.0     |  24     |  1.47             Phosphorous: x        TPro  5.8    /  Alb  2.1    /  TBili  1.1    /  DBili  x      /  AST  32     /  ALT  41     /  AlkPhos  52     18 Jan 2021 06:23    CAPILLARY BLOOD GLUCOSE      POCT Blood Glucose.: 140 mg/dL (18 Jan 2021 07:18)  POCT Blood Glucose.: 113 mg/dL (17 Jan 2021 21:03)  POCT Blood Glucose.: 199 mg/dL (17 Jan 2021 16:43)        Assesment/plan              86 yo male with multiple comorbidities including h/o DM type 2, HTN, HLD admitted with fatigue, chest pain    endocrinology consulted for glycemic management    DM type 2  uncontrolled  HBA1C 7.7  complicated by uncontrolled hyperglycemia, high dose steroid use, acute COVID 19  home regimen:  actos 45mg daily, glipizide 10mg bid, farxiga, semaglutide 14mg daily  follows with Dr Hadley outpatient    recommendations:  completed dexamethasone on 1/12  requiring significantly less basal/bolus since  fasting glucose controlled  continue current regimen    - continue insulin lantus 12 units daily  - continue insulin lispro 2 units pre meals  - reduce to low dose sliding scale  - reassess based on requirements  - goal inpatient glucose range: 140-180mg/dl    tentative discharge regimen:  discussed with patient- may need insulin outpatient depending on doses he requires  he is agreeable to it  stop glipizide 10mg bid- high risk of prolonged hypoglycemia  stop actos 45mg daily   stop farxiga  start insulin lantus 16 units daily  continue semaglutide po 14mg daily,     CHINYERE  cautious insulin dosing d/t reduced renal insulin clearance    COVID 19  on dexamethasone  remdesivir  hyperglycemic- adjust insulin regimen    HLD  LDL at goal  on repatha crestor 20 as outpatient  continue atorvastatin 40mg daily while inpatient      Discussed with primary team  Please call Endocrine- [phone]- Dr Katarina Sandoval as needed

## 2021-01-18 NOTE — PROGRESS NOTE ADULT - SUBJECTIVE AND OBJECTIVE BOX
[facility] NEPHROLOGY- PROGRESS NOTE    Patient is a 84yo [place] speaking Male with DM, HTN, BPH, HLD p/w abd pain and intermittent chest pain x 7 days. Found to be +COVID-19. Nephrology consulted for Elevated serum creatinine.    REVIEW OF SYSTEMS:  Cards: no chest pain  Resp: no SOB  GI: no nausea or vomiting or diarrhea  Vascular: no LE edema    VITALS:  T(F): 98.2 (01-18-21 @ 11:15), Max: 98.9 (01-17-21 @ 21:09)  HR: 87 (01-18-21 @ 11:15)  BP: 99/60 (01-18-21 @ 11:15)  RR: 17 (01-18-21 @ 11:15)  SpO2: 95% (01-18-21 @ 11:15)  Wt(kg): --    01-17 @ 07:01  -  01-18 @ 07:00  --------------------------------------------------------  IN: 200 mL / OUT: 400 mL / NET: -200 mL      PHYSICAL EXAM:  Gen: NAD, calm  Cards: RRR, +S1/S2, no M/G/R  Resp: CTA ant  GI: soft, NT/ND, NABS  Vascular: no LE edema B/L    LABS:  01-18    139  |  104  |  30<H>  ----------------------------<  158<H>  4.0   |  24  |  1.47<H>    Ca    8.8      18 Jan 2021 06:23    TPro  5.8<L>  /  Alb  2.1<L>  /  TBili  1.1  /  DBili      /  AST  32  /  ALT  41  /  AlkPhos  52  01-18    Creatinine Trend: 1.47 <--, 1.46 <--, 1.37 <--, 1.28 <--, 1.36 <--, 1.32 <--, 1.54 <--                        13.6   6.82  )-----------( 196      ( 18 Jan 2021 06:23 )             40.3

## 2021-01-19 LAB
GLUCOSE BLDC GLUCOMTR-MCNC: 146 MG/DL — HIGH (ref 70–99)
GLUCOSE BLDC GLUCOMTR-MCNC: 161 MG/DL — HIGH (ref 70–99)
GLUCOSE BLDC GLUCOMTR-MCNC: 174 MG/DL — HIGH (ref 70–99)
GLUCOSE BLDC GLUCOMTR-MCNC: 185 MG/DL — HIGH (ref 70–99)
SARS-COV-2 RNA SPEC QL NAA+PROBE: DETECTED

## 2021-01-19 PROCEDURE — 72100 X-RAY EXAM L-S SPINE 2/3 VWS: CPT | Mod: 26

## 2021-01-19 RX ADMIN — Medication 2 UNIT(S): at 17:56

## 2021-01-19 RX ADMIN — ATORVASTATIN CALCIUM 40 MILLIGRAM(S): 80 TABLET, FILM COATED ORAL at 22:22

## 2021-01-19 RX ADMIN — INSULIN GLARGINE 12 UNIT(S): 100 INJECTION, SOLUTION SUBCUTANEOUS at 22:21

## 2021-01-19 RX ADMIN — Medication 2: at 17:55

## 2021-01-19 RX ADMIN — Medication 2: at 12:38

## 2021-01-19 RX ADMIN — HEPARIN SODIUM 5000 UNIT(S): 5000 INJECTION INTRAVENOUS; SUBCUTANEOUS at 13:56

## 2021-01-19 RX ADMIN — TAMSULOSIN HYDROCHLORIDE 0.4 MILLIGRAM(S): 0.4 CAPSULE ORAL at 22:22

## 2021-01-19 RX ADMIN — Medication 81 MILLIGRAM(S): at 12:40

## 2021-01-19 RX ADMIN — Medication 2 UNIT(S): at 12:38

## 2021-01-19 RX ADMIN — HEPARIN SODIUM 5000 UNIT(S): 5000 INJECTION INTRAVENOUS; SUBCUTANEOUS at 22:21

## 2021-01-19 RX ADMIN — HEPARIN SODIUM 5000 UNIT(S): 5000 INJECTION INTRAVENOUS; SUBCUTANEOUS at 06:27

## 2021-01-19 RX ADMIN — PANTOPRAZOLE SODIUM 40 MILLIGRAM(S): 20 TABLET, DELAYED RELEASE ORAL at 06:27

## 2021-01-19 NOTE — PROGRESS NOTE ADULT - SUBJECTIVE AND OBJECTIVE BOX
PGY-1 Progress Note discussed with attending    PAGER #: [[phone]] TILL 5:00 PM  PLEASE CONTACT ON CALL TEAM:  - On Call Team (Please refer to Jag) FROM 5:00 PM - 8:30PM  - Nightfloat Team FROM 8:30 -7:30 AM    CHIEF COMPLAINT & BRIEF HOSPITAL COURSE:    INTERVAL HPI/OVERNIGHT EVENTS:       REVIEW OF SYSTEMS:  CONSTITUTIONAL: No fever, weight loss, or fatigue  RESPIRATORY: No cough, wheezing, chills or hemoptysis; No shortness of breath  CARDIOVASCULAR: No chest pain, palpitations, dizziness, or leg swelling  GASTROINTESTINAL: No abdominal pain. No nausea, vomiting, or hematemesis; No diarrhea or constipation. No melena or hematochezia.  GENITOURINARY: No dysuria or hematuria, urinary frequency  NEUROLOGICAL: No headaches, memory loss, loss of strength, numbness, or tremors  SKIN: No itching, burning, rashes, or lesions     MEDICATIONS  (STANDING):  aspirin enteric coated 81 milliGRAM(s) Oral daily  atorvastatin 40 milliGRAM(s) Oral at bedtime  heparin   Injectable 5000 Unit(s) SubCutaneous every 8 hours  insulin glargine Injectable (LANTUS) 12 Unit(s) SubCutaneous at bedtime  insulin lispro (ADMELOG) corrective regimen sliding scale   SubCutaneous three times a day before meals  insulin lispro Injectable (ADMELOG) 2 Unit(s) SubCutaneous three times a day before meals  losartan 25 milliGRAM(s) Oral daily  pantoprazole    Tablet 40 milliGRAM(s) Oral before breakfast  tamsulosin 0.4 milliGRAM(s) Oral at bedtime    MEDICATIONS  (PRN):  acetaminophen   Tablet .. 650 milliGRAM(s) Oral every 6 hours PRN Temp greater or equal to 38C (100.4F), Moderate Pain (4 - 6)      Vital Signs Last 24 Hrs  T(C): 36.6 (19 Jan 2021 08:13), Max: 36.8 (18 Jan 2021 11:15)  T(F): 97.8 (19 Jan 2021 08:13), Max: 98.2 (18 Jan 2021 11:15)  HR: 77 (19 Jan 2021 08:13) (63 - 94)  BP: 103/68 (19 Jan 2021 08:13) (99/60 - 113/67)  BP(mean): --  RR: 18 (19 Jan 2021 08:13) (17 - 20)  SpO2: 95% (19 Jan 2021 08:13) (95% - 98%)    PHYSICAL EXAMINATION:  GENERAL: NAD, well built  HEAD:  Atraumatic, Normocephalic  EYES:  conjunctiva and sclera clear  NECK: Supple, No JVD, Normal thyroid  CHEST/LUNG: Clear to auscultation. Clear to percussion bilaterally; No rales, rhonchi, wheezing, or rubs  HEART: Regular rate and rhythm; No murmurs, rubs, or gallops  ABDOMEN: Soft, Nontender, Nondistended; Bowel sounds present  NERVOUS SYSTEM:  Alert & Oriented X3,    EXTREMITIES:  2+ Peripheral Pulses, No clubbing, cyanosis, or edema  SKIN: warm dry                          13.6   6.82  )-----------( 196      ( 18 Jan 2021 06:23 )             40.3     01-18    139  |  104  |  30<H>  ----------------------------<  158<H>  4.0   |  24  |  1.47<H>    Ca    8.8      18 Jan 2021 06:23    TPro  5.8<L>  /  Alb  2.1<L>  /  TBili  1.1  /  DBili  x   /  AST  32  /  ALT  41  /  AlkPhos  52  01-18    LIVER FUNCTIONS - ( 18 Jan 2021 06:23 )  Alb: 2.1 g/dL / Pro: 5.8 g/dL / ALK PHOS: 52 U/L / ALT: 41 U/L DA / AST: 32 U/L / GGT: x                   CAPILLARY BLOOD GLUCOSE      RADIOLOGY & ADDITIONAL TESTS:                   PGY-1 Progress Note discussed with attending    PAGER #: [[phone]] TILL 5:00 PM  PLEASE CONTACT ON CALL TEAM:  - On Call Team (Please refer to Jag) FROM 5:00 PM - 8:30PM  - Nightfloat Team FROM 8:30 -7:30 AM    CHIEF COMPLAINT & BRIEF HOSPITAL COURSE:  85 year old male with significant medical history of DM, HLD, HTN, BPH and surgical history of appendectomy presenting to the ED with complaints of abdominal pain and intermittent chest pain for the last 7 days. He had fever 7 days ago, since then he is feeling weak, had two falls, one was 3 days ago and the other was yesterday. He denies LOC, syncope or prodromal symptoms. He says he hit his head at back. Reports that the chest pain is on and off, 5/10, non radiating, feels like pressure and tightness, associated with breathing difficulty. Relates that he has not been able to eat, having loss of appetite, but does feel thirsty. Denies significant weight loss, cough, abdominal pain, nausea, vomiting, headache, visual changes, or any other acute complaints. Patient was found to be COVID positive , started on decadron, remdisivir and supportive measures. US kidney for CHINYERE/ obstruction deferred due to Ultrasound dept policy. Patient symptomatically improved and was taken off oxygen 1/14, saturating well on RA. he was re-evaluated by PT and found to have some deficits for which he could use PT.     INTERVAL HPI/OVERNIGHT EVENTS:   Pt doing well with no new complaints apart from chronic back pain. Awaiting D/[facility] to [facility].    REVIEW OF SYSTEMS:  CONSTITUTIONAL: No fever, weight loss, or fatigue  RESPIRATORY: Improving Cough, No wheezing, chills or hemoptysis  CARDIOVASCULAR: No chest pain, palpitations, dizziness, or leg swelling  GASTROINTESTINAL: No abdominal pain. No nausea, vomiting, or hematemesis; No diarrhea or constipation. No melena or hematochezia.  GENITOURINARY: No dysuria or hematuria, urinary frequency  NEUROLOGICAL: No headaches, memory loss, loss of strength, numbness, or tremors  SKIN: No itching, burning, rashes, or lesions     MEDICATIONS  (STANDING):  aspirin enteric coated 81 milliGRAM(s) Oral daily  atorvastatin 40 milliGRAM(s) Oral at bedtime  heparin   Injectable 5000 Unit(s) SubCutaneous every 8 hours  insulin glargine Injectable (LANTUS) 12 Unit(s) SubCutaneous at bedtime  insulin lispro (ADMELOG) corrective regimen sliding scale   SubCutaneous three times a day before meals  insulin lispro Injectable (ADMELOG) 2 Unit(s) SubCutaneous three times a day before meals  losartan 25 milliGRAM(s) Oral daily  pantoprazole    Tablet 40 milliGRAM(s) Oral before breakfast  tamsulosin 0.4 milliGRAM(s) Oral at bedtime    MEDICATIONS  (PRN):  acetaminophen   Tablet .. 650 milliGRAM(s) Oral every 6 hours PRN Temp greater or equal to 38C (100.4F), Moderate Pain (4 - 6)      Vital Signs Last 24 Hrs  T(C): 36.6 (19 Jan 2021 08:13), Max: 36.8 (18 Jan 2021 11:15)  T(F): 97.8 (19 Jan 2021 08:13), Max: 98.2 (18 Jan 2021 11:15)  HR: 77 (19 Jan 2021 08:13) (63 - 94)  BP: 103/68 (19 Jan 2021 08:13) (99/60 - 113/67)  BP(mean): --  RR: 18 (19 Jan 2021 08:13) (17 - 20)  SpO2: 95% (19 Jan 2021 08:13) (95% - 98%)    PHYSICAL EXAMINATION:  GENERAL: NAD, thin  male, in NAD, on RA   HEAD:  Atraumatic, Normocephalic  EYES:  conjunctiva and sclera clear  NECK: Supple, No JVD, Normal thyroid  CHEST/LUNG: Clear to auscultation. Clear to percussion bilaterally; No rales, rhonchi, wheezing, or rubs  HEART: Regular rate and rhythm; No murmurs, rubs, or gallops  ABDOMEN: Soft, Nontender, Nondistended; Bowel sounds present  NERVOUS SYSTEM:  Alert & Oriented X3,no motor or sensory loss   EXTREMITIES:  2+ Peripheral Pulses, No clubbing, cyanosis, or edema  SKIN: warm dry                          13.6   6.82  )-----------( 196      ( 18 Jan 2021 06:23 )             40.3     01-18    139  |  104  |  30<H>  ----------------------------<  158<H>  4.0   |  24  |  1.47<H>    Ca    8.8      18 Jan 2021 06:23    TPro  5.8<L>  /  Alb  2.1<L>  /  TBili  1.1  /  DBili  x   /  AST  32  /  ALT  41  /  AlkPhos  52  01-18    LIVER FUNCTIONS - ( 18 Jan 2021 06:23 )  Alb: 2.1 g/dL / Pro: 5.8 g/dL / ALK PHOS: 52 U/L / ALT: 41 U/L DA / AST: 32 U/L / GGT: x                   CAPILLARY BLOOD GLUCOSE      RADIOLOGY & ADDITIONAL TESTS:

## 2021-01-19 NOTE — PROGRESS NOTE ADULT - SUBJECTIVE AND OBJECTIVE BOX
Interval Events:    tolerating po diet  poc glucose intermittently elevated  requiring low doses overall on sliding scale  Allergies    No Known Allergies    Intolerances      Endocrine/Metabolic Medications:  atorvastatin 40 milliGRAM(s) Oral at bedtime  insulin glargine Injectable (LANTUS) 12 Unit(s) SubCutaneous at bedtime  insulin lispro (ADMELOG) corrective regimen sliding scale   SubCutaneous three times a day before meals  insulin lispro Injectable (ADMELOG) 2 Unit(s) SubCutaneous three times a day before meals      Vital Signs Last 24 Hrs  T(C): 36.6 (19 Jan 2021 04:50), Max: 36.8 (18 Jan 2021 11:15)  T(F): 97.8 (19 Jan 2021 04:50), Max: 98.2 (18 Jan 2021 11:15)  HR: 63 (19 Jan 2021 04:50) (63 - 94)  BP: 106/64 (19 Jan 2021 04:50) (99/60 - 113/67)  BP(mean): --  RR: 20 (19 Jan 2021 04:50) (17 - 20)  SpO2: 95% (19 Jan 2021 04:50) (94% - 98%)      PHYSICAL EXAM    Constitutional:    NC/AT:    HEENT:    Neck:  No JVD  Respiratory:  reduced breath sounds b/l bases    Cardiovascular:  RR     Gastrointestinal: Soft     Extremities: without cyanosis    Neurological:  non focal      LABS        CAPILLARY BLOOD GLUCOSE      POCT Blood Glucose.: 194 mg/dL (18 Jan 2021 21:32)  POCT Blood Glucose.: 145 mg/dL (18 Jan 2021 17:08)  POCT Blood Glucose.: 196 mg/dL (18 Jan 2021 12:07)  POCT Blood Glucose.: 140 mg/dL (18 Jan 2021 07:18)        Assesment/plan            86 yo male with multiple comorbidities including h/o DM type 2, HTN, HLD admitted with fatigue, chest pain    endocrinology consulted for glycemic management    DM type 2  uncontrolled  HBA1C 7.7  complicated by uncontrolled hyperglycemia, high dose steroid use, acute COVID 19  home regimen:  actos 45mg daily, glipizide 10mg bid, farxiga, semaglutide 14mg daily  follows with Dr Hadley outpatient    recommendations:  completed dexamethasone on 1/12  requiring significantly less basal/bolus since  poc glucose intermittently elevated, requiring mild additional doses on sliding scale    - continue insulin lantus 12 units daily  - continue insulin lispro 2 units pre meals  - reduce to low dose sliding scale  - reassess based on requirements  - goal inpatient glucose range: 140-180mg/dl    tentative discharge regimen:  discussed with patient- may need insulin outpatient depending on doses he requires  he is agreeable to it  stop glipizide 10mg bid- high risk of prolonged hypoglycemia  stop actos 45mg daily   stop farxiga  start insulin lantus 16 units daily  continue semaglutide po 14mg daily,     CHINYERE  cautious insulin dosing d/t reduced renal insulin clearance    COVID 19  on dexamethasone  remdesivir  hyperglycemic- adjust insulin regimen    HLD  LDL at goal  on repatha, crestor 20 as outpatient  continue atorvastatin 40mg daily while inpatient      Discussed with primary team  Please call Endocrine- [phone]- Dr Katarina Sandoval as needed

## 2021-01-20 VITALS
RESPIRATION RATE: 18 BRPM | HEART RATE: 84 BPM | SYSTOLIC BLOOD PRESSURE: 100 MMHG | DIASTOLIC BLOOD PRESSURE: 55 MMHG | TEMPERATURE: 98 F | OXYGEN SATURATION: 98 %

## 2021-01-20 LAB
GLUCOSE BLDC GLUCOMTR-MCNC: 173 MG/DL — HIGH (ref 70–99)
GLUCOSE BLDC GLUCOMTR-MCNC: 177 MG/DL — HIGH (ref 70–99)

## 2021-01-20 RX ADMIN — Medication 2: at 11:51

## 2021-01-20 RX ADMIN — Medication 81 MILLIGRAM(S): at 11:52

## 2021-01-20 RX ADMIN — Medication 2: at 08:06

## 2021-01-20 RX ADMIN — Medication 2 UNIT(S): at 11:52

## 2021-01-20 RX ADMIN — HEPARIN SODIUM 5000 UNIT(S): 5000 INJECTION INTRAVENOUS; SUBCUTANEOUS at 06:37

## 2021-01-20 RX ADMIN — PANTOPRAZOLE SODIUM 40 MILLIGRAM(S): 20 TABLET, DELAYED RELEASE ORAL at 06:37

## 2021-01-20 RX ADMIN — Medication 2 UNIT(S): at 08:06

## 2021-01-20 NOTE — PROGRESS NOTE ADULT - PROBLEM SELECTOR PLAN 3
Same as above

## 2021-01-20 NOTE — PROGRESS NOTE ADULT - PROBLEM SELECTOR PROBLEM 3
COVID-19

## 2021-01-20 NOTE — PROGRESS NOTE ADULT - PROBLEM SELECTOR PROBLEM 8
Discharge planning issues
Prophylactic measure
Discharge planning issues
Discharge planning issues
Prophylactic measure
Prophylactic measure
Discharge planning issues
Prophylactic measure
Discharge planning issues
Prophylactic measure
Discharge planning issues
Prophylactic measure
Discharge planning issues
Prophylactic measure
Discharge planning issues

## 2021-01-20 NOTE — PROGRESS NOTE ADULT - PROVIDER SPECIALTY LIST ADULT
Endocrinology
Nephrology
Nephrology
Endocrinology
Internal Medicine
Nephrology
Pulmonology
Endocrinology
Nephrology
Pulmonology
Nephrology
Internal Medicine

## 2021-01-20 NOTE — PROGRESS NOTE ADULT - PROBLEM SELECTOR PROBLEM 7
HLD (hyperlipidemia)

## 2021-01-20 NOTE — PROGRESS NOTE ADULT - SUBJECTIVE AND OBJECTIVE BOX
Interval Events:    tolerating po intake  poc glucose controlled  requiring low additional doses on sliding scale  possible discharge today    Allergies    No Known Allergies    Intolerances      Endocrine/Metabolic Medications:  atorvastatin 40 milliGRAM(s) Oral at bedtime  insulin glargine Injectable (LANTUS) 12 Unit(s) SubCutaneous at bedtime  insulin lispro (ADMELOG) corrective regimen sliding scale   SubCutaneous three times a day before meals  insulin lispro Injectable (ADMELOG) 2 Unit(s) SubCutaneous three times a day before meals      Vital Signs Last 24 Hrs  T(C): 36.9 (20 Jan 2021 11:11), Max: 36.9 (20 Jan 2021 11:11)  T(F): 98.5 (20 Jan 2021 11:11), Max: 98.5 (20 Jan 2021 11:11)  HR: 84 (20 Jan 2021 11:11) (79 - 92)  BP: 100/55 (20 Jan 2021 11:11) (100/55 - 114/73)  BP(mean): --  RR: 18 (20 Jan 2021 11:11) (17 - 19)  SpO2: 98% (20 Jan 2021 11:11) (95% - 100%)      PHYSICAL EXAM    Constitutional:    NC/AT:    HEENT:    Neck:  No JVD  Respiratory:  reduced breath sounds b/l bases    Cardiovascular:  RR     Gastrointestinal: Soft     Extremities: without cyanosis    Neurological:  non focal    LABS        CAPILLARY BLOOD GLUCOSE      POCT Blood Glucose.: 177 mg/dL (20 Jan 2021 11:36)  POCT Blood Glucose.: 173 mg/dL (20 Jan 2021 07:30)  POCT Blood Glucose.: 161 mg/dL (19 Jan 2021 21:39)  POCT Blood Glucose.: 185 mg/dL (19 Jan 2021 16:57)  POCT Blood Glucose.: 174 mg/dL (19 Jan 2021 12:05)        Assesment/plan          86 yo male with multiple comorbidities including h/o DM type 2, HTN, HLD admitted with fatigue, chest pain    endocrinology consulted for glycemic management    DM type 2  uncontrolled  HBA1C 7.7  complicated by uncontrolled hyperglycemia, high dose steroid use, acute COVID 19  home regimen:  actos 45mg daily, glipizide 10mg bid, farxiga, semaglutide 14mg daily  follows with Dr Hadley outpatient    recommendations:  completed dexamethasone on 1/12  requiring significantly less basal/bolus since  poc glucose within inpatient range, requiring low additional doses on sliding scale    - continue insulin lantus 12 units daily  - continue insulin lispro 2 units pre meals  - reduce to low dose sliding scale  - reassess based on requirements  - goal inpatient glucose range: 140-180mg/dl    tentative discharge regimen:  discussed with patient- may need insulin outpatient depending on doses he requires  he is agreeable to it  stop glipizide 10mg bid- high risk of prolonged hypoglycemia  stop actos 45mg daily   stop farxiga  start insulin lantus 16 units daily  continue semaglutide po 14mg daily,     CHINYERE  cautious insulin dosing d/t reduced renal insulin clearance  improving    COVID 19  on dexamethasone  remdesivir  hyperglycemic- adjust insulin regimen    HLD  LDL at goal  on repatha, crestor 20 as outpatient  continue atorvastatin 40mg daily while inpatient      Discussed with primary team  Please call Endocrine- [phone]- Dr Katarina Sandoval as needed

## 2021-01-20 NOTE — PROGRESS NOTE ADULT - PROBLEM SELECTOR PROBLEM 2
Acute hypoxemic respiratory failure due to COVID-19

## 2021-01-20 NOTE — PROGRESS NOTE ADULT - SUBJECTIVE AND OBJECTIVE BOX
PGY-1 Progress Note discussed with attending    PAGER #: [[phone]] TILL 5:00 PM  PLEASE CONTACT ON CALL TEAM:  - On Call Team (Please refer to Jag) FROM 5:00 PM - 8:30PM  - Nightfloat Team FROM 8:30 -7:30 AM    CHIEF COMPLAINT & BRIEF HOSPITAL COURSE:    INTERVAL HPI/OVERNIGHT EVENTS:       REVIEW OF SYSTEMS:  CONSTITUTIONAL: No fever, weight loss, or fatigue  RESPIRATORY: No cough, wheezing, chills or hemoptysis; No shortness of breath  CARDIOVASCULAR: No chest pain, palpitations, dizziness, or leg swelling  GASTROINTESTINAL: No abdominal pain. No nausea, vomiting, or hematemesis; No diarrhea or constipation. No melena or hematochezia.  GENITOURINARY: No dysuria or hematuria, urinary frequency  NEUROLOGICAL: No headaches, memory loss, loss of strength, numbness, or tremors  SKIN: No itching, burning, rashes, or lesions     MEDICATIONS  (STANDING):  aspirin enteric coated 81 milliGRAM(s) Oral daily  atorvastatin 40 milliGRAM(s) Oral at bedtime  heparin   Injectable 5000 Unit(s) SubCutaneous every 8 hours  insulin glargine Injectable (LANTUS) 12 Unit(s) SubCutaneous at bedtime  insulin lispro (ADMELOG) corrective regimen sliding scale   SubCutaneous three times a day before meals  insulin lispro Injectable (ADMELOG) 2 Unit(s) SubCutaneous three times a day before meals  losartan 25 milliGRAM(s) Oral daily  pantoprazole    Tablet 40 milliGRAM(s) Oral before breakfast  tamsulosin 0.4 milliGRAM(s) Oral at bedtime    MEDICATIONS  (PRN):  acetaminophen   Tablet .. 650 milliGRAM(s) Oral every 6 hours PRN Temp greater or equal to 38C (100.4F), Moderate Pain (4 - 6)      Vital Signs Last 24 Hrs  T(C): 36.8 (20 Jan 2021 07:40), Max: 36.8 (19 Jan 2021 23:30)  T(F): 98.2 (20 Jan 2021 07:40), Max: 98.2 (19 Jan 2021 23:30)  HR: 80 (20 Jan 2021 07:40) (79 - 92)  BP: 106/65 (20 Jan 2021 07:40) (100/63 - 114/73)  BP(mean): --  RR: 18 (20 Jan 2021 07:40) (17 - 19)  SpO2: 98% (20 Jan 2021 07:40) (95% - 100%)    PHYSICAL EXAMINATION:  GENERAL: NAD, well built  HEAD:  Atraumatic, Normocephalic  EYES:  conjunctiva and sclera clear  NECK: Supple, No JVD, Normal thyroid  CHEST/LUNG: Clear to auscultation. Clear to percussion bilaterally; No rales, rhonchi, wheezing, or rubs  HEART: Regular rate and rhythm; No murmurs, rubs, or gallops  ABDOMEN: Soft, Nontender, Nondistended; Bowel sounds present  NERVOUS SYSTEM:  Alert & Oriented X3,    EXTREMITIES:  2+ Peripheral Pulses, No clubbing, cyanosis, or edema  SKIN: warm dry                      CAPILLARY BLOOD GLUCOSE      RADIOLOGY & ADDITIONAL TESTS:

## 2021-01-20 NOTE — DISCHARGE NOTE NURSING/CASE MANAGEMENT/SOCIAL WORK - NSPROEXTENSIONSOFSELF_GEN_A_NUR
----- Message from Maryanne Noel LPN sent at 4/10/2017  4:34 PM CDT -----  Contact: self      ----- Message -----     From: Dejah Moraes LPN     Sent: 4/10/2017   1:47 PM       To: Maryanne Noel LPN        ----- Message -----     From: Shana Wheat     Sent: 4/10/2017  12:57 PM       To: Hany [facility] Staff    Patient [phone] is returning call to Nurse Madden about scheduling an appt/please call   none

## 2021-01-20 NOTE — PROGRESS NOTE ADULT - PROBLEM SELECTOR PROBLEM 4
CHINYERE (acute kidney injury)

## 2021-01-20 NOTE — PROGRESS NOTE ADULT - REASON FOR ADMISSION
chest pain and abdominal pain

## 2021-01-20 NOTE — DISCHARGE NOTE NURSING/CASE MANAGEMENT/SOCIAL WORK - PATIENT PORTAL LINK FT
You can access the FollowMyHealth Patient Portal offered by [facility] by registering at the following website: [email or web address]. By joining [employer]’s FollowMyHealth portal, you will also be able to view your health information using other applications (apps) compatible with our system.

## 2021-01-20 NOTE — PROGRESS NOTE ADULT - PROBLEM SELECTOR PROBLEM 6
Diabetes

## 2021-06-03 PROCEDURE — 86140 C-REACTIVE PROTEIN: CPT

## 2021-06-03 PROCEDURE — 80053 COMPREHEN METABOLIC PANEL: CPT

## 2021-06-03 PROCEDURE — 82550 ASSAY OF CK (CPK): CPT

## 2021-06-03 PROCEDURE — 85027 COMPLETE CBC AUTOMATED: CPT

## 2021-06-03 PROCEDURE — 83735 ASSAY OF MAGNESIUM: CPT

## 2021-06-03 PROCEDURE — 85610 PROTHROMBIN TIME: CPT

## 2021-06-03 PROCEDURE — 85025 COMPLETE CBC W/AUTO DIFF WBC: CPT

## 2021-06-03 PROCEDURE — 83036 HEMOGLOBIN GLYCOSYLATED A1C: CPT

## 2021-06-03 PROCEDURE — 84100 ASSAY OF PHOSPHORUS: CPT

## 2021-06-03 PROCEDURE — 86769 SARS-COV-2 COVID-19 ANTIBODY: CPT

## 2021-06-03 PROCEDURE — U0005: CPT

## 2021-06-03 PROCEDURE — 85652 RBC SED RATE AUTOMATED: CPT

## 2021-06-03 PROCEDURE — 85379 FIBRIN DEGRADATION QUANT: CPT

## 2021-06-03 PROCEDURE — 82607 VITAMIN B-12: CPT

## 2021-06-03 PROCEDURE — 87040 BLOOD CULTURE FOR BACTERIA: CPT

## 2021-06-03 PROCEDURE — 84145 PROCALCITONIN (PCT): CPT

## 2021-06-03 PROCEDURE — 82728 ASSAY OF FERRITIN: CPT

## 2021-06-03 PROCEDURE — 84443 ASSAY THYROID STIM HORMONE: CPT

## 2021-06-03 PROCEDURE — 82436 ASSAY OF URINE CHLORIDE: CPT

## 2021-06-03 PROCEDURE — 83935 ASSAY OF URINE OSMOLALITY: CPT

## 2021-06-03 PROCEDURE — 80048 BASIC METABOLIC PNL TOTAL CA: CPT

## 2021-06-03 PROCEDURE — 97162 PT EVAL MOD COMPLEX 30 MIN: CPT

## 2021-06-03 PROCEDURE — 83605 ASSAY OF LACTIC ACID: CPT

## 2021-06-03 PROCEDURE — 72100 X-RAY EXAM L-S SPINE 2/3 VWS: CPT

## 2021-06-03 PROCEDURE — 83615 LACTATE (LD) (LDH) ENZYME: CPT

## 2021-06-03 PROCEDURE — 87086 URINE CULTURE/COLONY COUNT: CPT

## 2021-06-03 PROCEDURE — 82553 CREATINE MB FRACTION: CPT

## 2021-06-03 PROCEDURE — 71045 X-RAY EXAM CHEST 1 VIEW: CPT

## 2021-06-03 PROCEDURE — 80061 LIPID PANEL: CPT

## 2021-06-03 PROCEDURE — 93005 ELECTROCARDIOGRAM TRACING: CPT

## 2021-06-03 PROCEDURE — 82570 ASSAY OF URINE CREATININE: CPT

## 2021-06-03 PROCEDURE — 87635 SARS-COV-2 COVID-19 AMP PRB: CPT

## 2021-06-03 PROCEDURE — 82803 BLOOD GASES ANY COMBINATION: CPT

## 2021-06-03 PROCEDURE — 36415 COLL VENOUS BLD VENIPUNCTURE: CPT

## 2021-06-03 PROCEDURE — 84300 ASSAY OF URINE SODIUM: CPT

## 2021-06-03 PROCEDURE — 80076 HEPATIC FUNCTION PANEL: CPT

## 2021-06-03 PROCEDURE — 83880 ASSAY OF NATRIURETIC PEPTIDE: CPT

## 2021-06-03 PROCEDURE — 84484 ASSAY OF TROPONIN QUANT: CPT

## 2021-06-03 PROCEDURE — 99285 EMERGENCY DEPT VISIT HI MDM: CPT | Mod: 25

## 2021-06-03 PROCEDURE — 82746 ASSAY OF FOLIC ACID SERUM: CPT

## 2021-06-03 PROCEDURE — 70450 CT HEAD/BRAIN W/O DYE: CPT

## 2021-06-03 PROCEDURE — 82962 GLUCOSE BLOOD TEST: CPT

## 2021-06-03 PROCEDURE — 0225U NFCT DS DNA&RNA 21 SARSCOV2: CPT

## 2021-06-03 PROCEDURE — 81001 URINALYSIS AUTO W/SCOPE: CPT

## 2021-11-29 NOTE — PROGRESS NOTE ADULT - PROBLEM SELECTOR PLAN 2
- Presented with respiratory status and chest pain  - He is saturating 95% on RA at rest  - COVID positive  - f/u inflammatory markers  - C/w decadron, remdisivir finished full course   - Monitor respiratory status, O2 supplement as needed bed rails

## 2021-12-13 ENCOUNTER — TRANSCRIPTION ENCOUNTER (OUTPATIENT)
Age: 86
End: 2021-12-13

## 2022-04-05 NOTE — PATIENT PROFILE ADULT - FALL HARM RISK
bones(Osteoporosis,prev fx,steroid use,metastatic bone ca)/other Quality 110: Preventive Care And Screening: Influenza Immunization: Influenza Immunization Administered during Influenza season Quality 131: Pain Assessment And Follow-Up: Pain assessment using a standardized tool is documented as negative, no follow-up plan required Detail Level: Detailed

## 2022-05-03 NOTE — PROGRESS NOTE ADULT - PROBLEM SELECTOR PLAN 8
- PT eval for possible ambulatory dysfunction -> home with home PT   - May need LAURIE on discharge as covid pos vs home with [facility] psychiatric evaluation

## 2022-12-01 NOTE — DIETITIAN INITIAL EVALUATION ADULT. - PERTINENT MEDS FT
Call Center TCM Note    Flowsheet Row Responses   [facility] patient discharged from? Non-   Does the patient have one of the following disease processes/diagnoses(primary or secondary)? Other   TCM attempt successful? No   Unsuccessful attempts Attempt 2          Denisse Teixeira RN    12/1/2022, 14:28 EST      
reviewed

## 2023-01-18 NOTE — PHYSICAL THERAPY INITIAL EVALUATION ADULT - DISCHARGE DISPOSITION, PT EVAL
home w/ home PT home with home PT pending progress Clofazimine Pregnancy And Lactation Text: This medication is Pregnancy Category C and isn't considered safe during pregnancy. It is excreted in breast milk.

## 2023-08-25 NOTE — PROGRESS NOTE ADULT - SUBJECTIVE AND OBJECTIVE BOX
Time of Visit:  Patient seen and examined.     MEDICATIONS  (STANDING):  aspirin enteric coated 81 milliGRAM(s) Oral daily  atorvastatin 40 milliGRAM(s) Oral at bedtime  heparin   Injectable 5000 Unit(s) SubCutaneous every 8 hours  insulin glargine Injectable (LANTUS) 12 Unit(s) SubCutaneous at bedtime  insulin lispro (ADMELOG) corrective regimen sliding scale   SubCutaneous three times a day before meals  insulin lispro Injectable (ADMELOG) 2 Unit(s) SubCutaneous three times a day before meals  losartan 25 milliGRAM(s) Oral daily  pantoprazole    Tablet 40 milliGRAM(s) Oral before breakfast  tamsulosin 0.4 milliGRAM(s) Oral at bedtime      MEDICATIONS  (PRN):  acetaminophen   Tablet .. 650 milliGRAM(s) Oral every 6 hours PRN Temp greater or equal to 38C (100.4F), Moderate Pain (4 - 6)       Medications up to date at time of exam.      PHYSICAL EXAMINATION:  Patient has no new complaints.  GENERAL: The patient is a well-developed, well-nourished, in no apparent distress.     Vital Signs Last 24 Hrs  T(C): 36.9 (20 Jan 2021 11:11), Max: 36.9 (20 Jan 2021 11:11)  T(F): 98.5 (20 Jan 2021 11:11), Max: 98.5 (20 Jan 2021 11:11)  HR: 84 (20 Jan 2021 11:11) (80 - 84)  BP: 100/55 (20 Jan 2021 11:11) (100/55 - 114/68)  BP(mean): --  RR: 18 (20 Jan 2021 11:11) (18 - 19)  SpO2: 98% (20 Jan 2021 11:11) (95% - 98%)   (if applicable)    Chest Tube (if applicable)    HEENT: Head is normocephalic and atraumatic. Extraocular muscles are intact. Mucous membranes are moist.     NECK: Supple, no palpable adenopathy.    LUNGS: Clear to auscultation, no wheezing, rales, or rhonchi.    HEART: Regular rate and rhythm without murmur.    ABDOMEN: Soft, nontender, and nondistended.  No hepatosplenomegaly is noted.    : No painful voiding, no pelvic pain    EXTREMITIES: Without any cyanosis, clubbing, rash, lesions or edema.    NEUROLOGIC: Awake, alert, oriented, grossly intact    SKIN: Warm, dry, good turgor.      LABS:                              MICROBIOLOGY: (if applicable)    RADIOLOGY & ADDITIONAL STUDIES:  EKG:   CXR:  ECHO:    IMPRESSION: 85y Male PAST MEDICAL & SURGICAL HISTORY:  BPH (benign prostatic hyperplasia)    HLD (hyperlipidemia)    DM (diabetes mellitus)    HTN (hypertension)    History of appendectomy     p/w           IMP: This is an 85 yr old man , non smoker with HTN, DM uncontrol , BPH  admitted for  hypoxia      ASS   Acute Hypoxic Resp Failure   PNA b/l   COVID-19 infection    DM uncontrol    HTN   CHINYERE    Sugg:  -isolate: contact and air borne   -now tolerating room air   -s/p  decadron   -IVF  -blood sugar control   -dvt/gi prophy  -monitor biomarkers and LFT TIW  -PT  -OOB to chair   -out pat pulmonary f/u            The patient is a 6y Male complaining of difficulty breathing.
